# Patient Record
Sex: FEMALE | Race: WHITE | Employment: UNEMPLOYED | URBAN - METROPOLITAN AREA
[De-identification: names, ages, dates, MRNs, and addresses within clinical notes are randomized per-mention and may not be internally consistent; named-entity substitution may affect disease eponyms.]

---

## 2017-04-26 ENCOUNTER — APPOINTMENT (OUTPATIENT)
Dept: GENERAL RADIOLOGY | Age: 57
DRG: 049 | End: 2017-04-26
Attending: EMERGENCY MEDICINE
Payer: MEDICAID

## 2017-04-26 ENCOUNTER — HOSPITAL ENCOUNTER (INPATIENT)
Age: 57
LOS: 22 days | Discharge: NURSING, REHAB OR LONG-TERM CARE | DRG: 049 | End: 2017-05-19
Attending: EMERGENCY MEDICINE | Admitting: FAMILY MEDICINE
Payer: MEDICAID

## 2017-04-26 ENCOUNTER — APPOINTMENT (OUTPATIENT)
Dept: CT IMAGING | Age: 57
DRG: 049 | End: 2017-04-26
Attending: EMERGENCY MEDICINE
Payer: MEDICAID

## 2017-04-26 DIAGNOSIS — E87.6 ACUTE HYPOKALEMIA: Primary | ICD-10-CM

## 2017-04-26 DIAGNOSIS — R13.10 DYSPHAGIA, UNSPECIFIED TYPE: ICD-10-CM

## 2017-04-26 DIAGNOSIS — E86.0 DEHYDRATION: ICD-10-CM

## 2017-04-26 DIAGNOSIS — E87.20 LACTIC ACIDOSIS: ICD-10-CM

## 2017-04-26 LAB
ALBUMIN SERPL BCP-MCNC: 3.9 G/DL (ref 3.4–5)
ALBUMIN/GLOB SERPL: 1 {RATIO} (ref 0.8–1.7)
ALP SERPL-CCNC: 137 U/L (ref 45–117)
ALT SERPL-CCNC: 230 U/L (ref 13–56)
AMPHET UR QL SCN: NEGATIVE
ANION GAP BLD CALC-SCNC: 18 MMOL/L (ref 3–18)
APAP SERPL-MCNC: <2 UG/ML (ref 10–30)
APPEARANCE UR: CLEAR
AST SERPL W P-5'-P-CCNC: 103 U/L (ref 15–37)
BACTERIA URNS QL MICRO: ABNORMAL /HPF
BARBITURATES UR QL SCN: NEGATIVE
BASOPHILS # BLD AUTO: 0 K/UL (ref 0–0.06)
BASOPHILS # BLD: 0 % (ref 0–2)
BENZODIAZ UR QL: NEGATIVE
BILIRUB SERPL-MCNC: 2.6 MG/DL (ref 0.2–1)
BILIRUB UR QL: ABNORMAL
BUN SERPL-MCNC: 21 MG/DL (ref 7–18)
BUN/CREAT SERPL: 18 (ref 12–20)
CALCIUM SERPL-MCNC: 9.6 MG/DL (ref 8.5–10.1)
CANNABINOIDS UR QL SCN: NEGATIVE
CHLORIDE SERPL-SCNC: 89 MMOL/L (ref 100–108)
CK MB CFR SERPL CALC: 0.8 % (ref 0–4)
CK MB SERPL-MCNC: 1.8 NG/ML (ref 5–25)
CK SERPL-CCNC: 235 U/L (ref 26–192)
CO2 SERPL-SCNC: 25 MMOL/L (ref 21–32)
COCAINE UR QL SCN: NEGATIVE
COLOR UR: ABNORMAL
CREAT SERPL-MCNC: 1.17 MG/DL (ref 0.6–1.3)
D DIMER PPP FEU-MCNC: <0.27 UG/ML(FEU)
DIFFERENTIAL METHOD BLD: ABNORMAL
EOSINOPHIL # BLD: 0 K/UL (ref 0–0.4)
EOSINOPHIL NFR BLD: 0 % (ref 0–5)
EPITH CASTS URNS QL MICRO: ABNORMAL /LPF (ref 0–5)
ERYTHROCYTE [DISTWIDTH] IN BLOOD BY AUTOMATED COUNT: 13 % (ref 11.6–14.5)
ETHANOL SERPL-MCNC: <3 MG/DL (ref 0–3)
GLOBULIN SER CALC-MCNC: 3.8 G/DL (ref 2–4)
GLUCOSE SERPL-MCNC: 139 MG/DL (ref 74–99)
GLUCOSE UR STRIP.AUTO-MCNC: 100 MG/DL
HCT VFR BLD AUTO: 45.8 % (ref 35–45)
HDSCOM,HDSCOM: NORMAL
HGB BLD-MCNC: 17 G/DL (ref 12–16)
HGB UR QL STRIP: ABNORMAL
KETONES UR QL STRIP.AUTO: NEGATIVE MG/DL
LACTATE BLD-SCNC: 10.6 MMOL/L (ref 0.4–2)
LEUKOCYTE ESTERASE UR QL STRIP.AUTO: ABNORMAL
LYMPHOCYTES # BLD AUTO: 34 % (ref 21–52)
LYMPHOCYTES # BLD: 4.6 K/UL (ref 0.9–3.6)
MAGNESIUM SERPL-MCNC: 2.4 MG/DL (ref 1.6–2.6)
MCH RBC QN AUTO: 31.2 PG (ref 24–34)
MCHC RBC AUTO-ENTMCNC: 37.1 G/DL (ref 31–37)
MCV RBC AUTO: 84 FL (ref 74–97)
METHADONE UR QL: NEGATIVE
MONOCYTES # BLD: 1.1 K/UL (ref 0.05–1.2)
MONOCYTES NFR BLD AUTO: 8 % (ref 3–10)
NEUTS SEG # BLD: 7.7 K/UL (ref 1.8–8)
NEUTS SEG NFR BLD AUTO: 58 % (ref 40–73)
NITRITE UR QL STRIP.AUTO: POSITIVE
OPIATES UR QL: NEGATIVE
PCP UR QL: NEGATIVE
PH UR STRIP: 5.5 [PH] (ref 5–8)
PLATELET # BLD AUTO: 277 K/UL (ref 135–420)
PMV BLD AUTO: 10.9 FL (ref 9.2–11.8)
POTASSIUM SERPL-SCNC: 1.9 MMOL/L (ref 3.5–5.5)
PROT SERPL-MCNC: 7.7 G/DL (ref 6.4–8.2)
PROT UR STRIP-MCNC: ABNORMAL MG/DL
RBC # BLD AUTO: 5.45 M/UL (ref 4.2–5.3)
RBC #/AREA URNS HPF: 0 /HPF (ref 0–5)
SALICYLATES SERPL-MCNC: <2.8 MG/DL (ref 2.8–20)
SODIUM SERPL-SCNC: 132 MMOL/L (ref 136–145)
SP GR UR REFRACTOMETRY: >1.03 (ref 1–1.03)
TROPONIN I SERPL-MCNC: <0.02 NG/ML (ref 0–0.04)
TSH SERPL DL<=0.05 MIU/L-ACNC: 1.84 UIU/ML (ref 0.36–3.74)
UROBILINOGEN UR QL STRIP.AUTO: 1 EU/DL (ref 0.2–1)
WBC # BLD AUTO: 13.4 K/UL (ref 4.6–13.2)
WBC URNS QL MICRO: ABNORMAL /HPF (ref 0–4)

## 2017-04-26 PROCEDURE — 80047 BASIC METABLC PNL IONIZED CA: CPT

## 2017-04-26 PROCEDURE — 85379 FIBRIN DEGRADATION QUANT: CPT | Performed by: EMERGENCY MEDICINE

## 2017-04-26 PROCEDURE — 80307 DRUG TEST PRSMV CHEM ANLYZR: CPT | Performed by: EMERGENCY MEDICINE

## 2017-04-26 PROCEDURE — 71010 XR CHEST PORT: CPT

## 2017-04-26 PROCEDURE — 84443 ASSAY THYROID STIM HORMONE: CPT | Performed by: EMERGENCY MEDICINE

## 2017-04-26 PROCEDURE — 83735 ASSAY OF MAGNESIUM: CPT | Performed by: EMERGENCY MEDICINE

## 2017-04-26 PROCEDURE — 96365 THER/PROPH/DIAG IV INF INIT: CPT

## 2017-04-26 PROCEDURE — 85025 COMPLETE CBC W/AUTO DIFF WBC: CPT | Performed by: EMERGENCY MEDICINE

## 2017-04-26 PROCEDURE — 96366 THER/PROPH/DIAG IV INF ADDON: CPT

## 2017-04-26 PROCEDURE — 87040 BLOOD CULTURE FOR BACTERIA: CPT | Performed by: EMERGENCY MEDICINE

## 2017-04-26 PROCEDURE — 93005 ELECTROCARDIOGRAM TRACING: CPT

## 2017-04-26 PROCEDURE — 80053 COMPREHEN METABOLIC PANEL: CPT | Performed by: EMERGENCY MEDICINE

## 2017-04-26 PROCEDURE — 70450 CT HEAD/BRAIN W/O DYE: CPT

## 2017-04-26 PROCEDURE — 83605 ASSAY OF LACTIC ACID: CPT

## 2017-04-26 PROCEDURE — 74011250637 HC RX REV CODE- 250/637: Performed by: EMERGENCY MEDICINE

## 2017-04-26 PROCEDURE — 74011000258 HC RX REV CODE- 258: Performed by: EMERGENCY MEDICINE

## 2017-04-26 PROCEDURE — 77030005514 HC CATH URETH FOL14 BARD -A

## 2017-04-26 PROCEDURE — 82550 ASSAY OF CK (CPK): CPT | Performed by: EMERGENCY MEDICINE

## 2017-04-26 PROCEDURE — 51702 INSERT TEMP BLADDER CATH: CPT

## 2017-04-26 PROCEDURE — 96368 THER/DIAG CONCURRENT INF: CPT

## 2017-04-26 PROCEDURE — 87086 URINE CULTURE/COLONY COUNT: CPT | Performed by: EMERGENCY MEDICINE

## 2017-04-26 PROCEDURE — 74011250636 HC RX REV CODE- 250/636: Performed by: EMERGENCY MEDICINE

## 2017-04-26 PROCEDURE — 99285 EMERGENCY DEPT VISIT HI MDM: CPT

## 2017-04-26 PROCEDURE — 87641 MR-STAPH DNA AMP PROBE: CPT | Performed by: EMERGENCY MEDICINE

## 2017-04-26 PROCEDURE — 96375 TX/PRO/DX INJ NEW DRUG ADDON: CPT

## 2017-04-26 PROCEDURE — 96367 TX/PROPH/DG ADDL SEQ IV INF: CPT

## 2017-04-26 PROCEDURE — 81001 URINALYSIS AUTO W/SCOPE: CPT | Performed by: EMERGENCY MEDICINE

## 2017-04-26 RX ORDER — POTASSIUM CHLORIDE 7.45 MG/ML
10 INJECTION INTRAVENOUS
Status: COMPLETED | OUTPATIENT
Start: 2017-04-26 | End: 2017-04-27

## 2017-04-26 RX ORDER — DIPHENHYDRAMINE HYDROCHLORIDE 50 MG/ML
25 INJECTION, SOLUTION INTRAMUSCULAR; INTRAVENOUS
Status: COMPLETED | OUTPATIENT
Start: 2017-04-26 | End: 2017-04-26

## 2017-04-26 RX ORDER — MAGNESIUM SULFATE HEPTAHYDRATE 40 MG/ML
2 INJECTION, SOLUTION INTRAVENOUS ONCE
Status: COMPLETED | OUTPATIENT
Start: 2017-04-26 | End: 2017-04-26

## 2017-04-26 RX ORDER — OXYMETAZOLINE HCL 0.05 %
3 SPRAY, NON-AEROSOL (ML) NASAL
Status: ACTIVE | OUTPATIENT
Start: 2017-04-26 | End: 2017-04-27

## 2017-04-26 RX ORDER — SODIUM CHLORIDE 0.9 % (FLUSH) 0.9 %
5-10 SYRINGE (ML) INJECTION AS NEEDED
Status: DISCONTINUED | OUTPATIENT
Start: 2017-04-26 | End: 2017-05-19 | Stop reason: HOSPADM

## 2017-04-26 RX ORDER — LORAZEPAM 2 MG/ML
0.5 INJECTION INTRAMUSCULAR
Status: COMPLETED | OUTPATIENT
Start: 2017-04-26 | End: 2017-04-26

## 2017-04-26 RX ADMIN — POTASSIUM CHLORIDE 10 MEQ: 7.46 INJECTION, SOLUTION INTRAVENOUS at 23:46

## 2017-04-26 RX ADMIN — SODIUM CHLORIDE 2000 ML: 900 INJECTION, SOLUTION INTRAVENOUS at 22:03

## 2017-04-26 RX ADMIN — MAGNESIUM SULFATE HEPTAHYDRATE 2 G: 40 INJECTION, SOLUTION INTRAVENOUS at 22:17

## 2017-04-26 RX ADMIN — DIPHENHYDRAMINE HYDROCHLORIDE 25 MG: 50 INJECTION, SOLUTION INTRAMUSCULAR; INTRAVENOUS at 22:03

## 2017-04-26 RX ADMIN — LORAZEPAM 0.5 MG: 2 INJECTION, SOLUTION INTRAMUSCULAR; INTRAVENOUS at 22:03

## 2017-04-26 RX ADMIN — SODIUM CHLORIDE 1000 MG: 900 INJECTION, SOLUTION INTRAVENOUS at 22:21

## 2017-04-26 RX ADMIN — SODIUM CHLORIDE 1000 ML: 900 INJECTION, SOLUTION INTRAVENOUS at 22:02

## 2017-04-26 RX ADMIN — SODIUM CHLORIDE 1500 MG: 900 INJECTION, SOLUTION INTRAVENOUS at 23:00

## 2017-04-26 RX ADMIN — CEFEPIME 2 G: 2 INJECTION, POWDER, FOR SOLUTION INTRAVENOUS at 22:00

## 2017-04-26 RX ADMIN — POTASSIUM CHLORIDE 10 MEQ: 7.46 INJECTION, SOLUTION INTRAVENOUS at 23:00

## 2017-04-26 NOTE — IP AVS SNAPSHOT
[address] [address] 
[phone] Patient: Shandra Holden MRN: [id number] CAB:4[birth year]53/[birth year]33 You are allergic to the following Allergen Reactions Aspirin Other (comments) Stomach cramps Ibuprofen Not Reported This Time Levaquin (Levofloxacin) Not Reported This Time Mobic (Meloxicam) Not Reported This Time Pcn (Penicillins) Not Reported This Time Plaquenil (Hydroxychloroquine) Not Reported This Time Recent Documentation Height Weight BMI OB Status Smoking Status 1.626 m 73 kg 27.64 kg/m2 Postmenopausal Current Every Day Smoker Unresulted Labs Order Current Status CULTURE, BLOOD Preliminary result CULTURE, BLOOD Preliminary result CULTURE, URINE Preliminary result Emergency Contacts Name Discharge Info Relation Home Work Mobile Beckie Casillas [5] [phone] Man Carranza  Other Relative [6] [phone] About your hospitalization You were admitted on:  April 27, 2017 You last received care in the[address] [facility] You were discharged on:  May 19, 2017 Unit phone number:  [phone] Why you were hospitalized Your primary diagnosis was:  Encephalopathy Your diagnoses also included:  Dehydration, Hypokalemia, Lactic Acidosis, Polycythemia (Hcc), Leukocytosis, Uti (Urinary Tract Infection), Elevated Liver Enzymes, Acute Inflammatory Polyneuropathy (Hcc), 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), Quadriparesis (Hcc) Providers Seen During Your Hospitalizations Provider Role Specialty Primary office phone Charla Haddad MD Attending Provider Emergency Medicine [phone] Syed Burch MD Attending Provider General acute hospital [phone] Tiny Navas DO Attending Provider Internal Medicine [phone] Antoni Cervantes DO Attending Provider Internal Medicine [phone] Monalisa Thompson MD Attending Provider Internal Medicine [phone] Nikolai Cordero MD Attending Provider Internal Medicine [phone] Your Primary Care Physician (PCP) Primary Care Physician Office Phone Office Fax Becky Welsh [phone] Follow-up Information Follow up With Details Comments Contact Info Machelle Meyer II, MD   [address] [place] [address] [phone] Current Discharge Medication List  
  
START taking these medications Dose & Instructions Dispensing Information Comments Morning Noon Evening Bedtime  
 enoxaparin 40 mg/0.4 mL Commonly known as:  LOVENOX Your last dose was: Your next dose is:    
   
   
 Dose:  40 mg  
0.4 mL by SubCUTAneous route every twenty-four (24) hours. Quantity:  10 Syringe Refills:  0  
     
   
   
   
  
 folic acid 1 mg tablet Commonly known as:  Google Your last dose was: Your next dose is:    
   
   
 Dose:  1 mg Take 1 Tab by mouth daily. Quantity:  30 Tab Refills:  0  
     
   
   
   
  
 gabapentin 300 mg capsule Commonly known as:  NEURONTIN Your last dose was: Your next dose is:    
   
   
 Dose:  600 mg Take 2 Caps by mouth three (3) times daily. Quantity:  30 Cap Refills:  0  
     
   
   
   
  
 oxyCODONE IR 10 mg Tab immediate release tablet Commonly known as:  Suri Hails Your last dose was: Your next dose is:    
   
   
 Dose:  10 mg Take 1 Tab by mouth every four (4) hours as needed. Max Daily Amount: 60 mg.  
 Quantity:  12 Tab Refills:  0  
     
   
   
   
  
 scopolamine 1.5 mg (1 mg over 3 days) Pt3d Commonly known as:  TRANSDERM-SCOP Your last dose was: Your next dose is:    
   
   
 Dose:  1.5 mg  
1 Patch by TransDERmal route every seventy-two (72) hours. Quantity:  10 Patch Refills:  0  
     
   
   
   
  
 senna 8.6 mg tablet Commonly known as:  Jean-Pierre Gomez Your last dose was: Your next dose is:    
   
   
 Dose:  2 Tab Take 2 Tabs by mouth nightly. Quantity:  10 Tab Refills:  0 Thiamine Mononitrate 100 mg tablet Commonly known as:  B-1 Your last dose was: Your next dose is:    
   
   
 Dose:  100 mg Take 1 Tab by mouth daily. Quantity:  30 Tab Refills:  0 CONTINUE these medications which have CHANGED Dose & Instructions Dispensing Information Comments Morning Noon Evening Bedtime * acetaminophen 325 mg tablet Commonly known as:  TYLENOL What changed:  Another medication with the same name was added. Make sure you understand how and when to take each. Your last dose was: Your next dose is:    
   
   
 Dose:  650 mg Take 2 Tabs by mouth every four (4) hours as needed. Indications: BACK PAIN Quantity:  60 Tab Refills:  0  
     
   
   
   
  
 * acetaminophen 325 mg tablet Commonly known as:  TYLENOL What changed: You were already taking a medication with the same name, and this prescription was added. Make sure you understand how and when to take each. Your last dose was: Your next dose is:    
   
   
 Dose:  650 mg Take 2 Tabs by mouth every four (4) hours as needed. Quantity:  30 Tab Refills:  0  
     
   
   
   
  
 * Notice: This list has 2 medication(s) that are the same as other medications prescribed for you. Read the directions carefully, and ask your doctor or other care provider to review them with you. CONTINUE these medications which have NOT CHANGED Dose & Instructions Dispensing Information Comments Morning Noon Evening Bedtime  
 atenolol 50 mg tablet Commonly known as:  TENORMIN Your last dose was:     
   
Your next dose is:    
   
   
 Dose:  50 mg  
 Take 1 Tab by mouth daily. Indications: HYPERTENSION Quantity:  30 Tab Refills:  0  
     
   
   
   
  
 citalopram 20 mg tablet Commonly known as:  Nikko Shore Your last dose was: Your next dose is:    
   
   
 Dose:  20 mg Take 1 Tab by mouth daily. Indications: MAJOR DEPRESSIVE DISORDER Quantity:  30 Tab Refills:  0 CLARINEX 5 mg tablet Generic drug:  desloratadine Your last dose was: Your next dose is:    
   
   
 Dose:  5 mg Take 5 mg by mouth daily. Refills:  0  
     
   
   
   
  
 clonazePAM 1 mg tablet Commonly known as:  Sindy Puri Your last dose was: Your next dose is:    
   
   
 Dose:  1 mg Take 1 Tab by mouth nightly. Max Daily Amount: 1 mg. Indications: PANIC DISORDER Quantity:  30 Tab Refills:  0  
     
   
   
   
  
 * famotidine 40 mg tablet Commonly known as:  PEPCID Your last dose was: Your next dose is:    
   
   
 Dose:  40 mg Take 40 mg by mouth daily. Refills:  0  
     
   
   
   
  
 * famotidine 40 mg tablet Commonly known as:  PEPCID Your last dose was: Your next dose is:    
   
   
 Dose:  40 mg Take 1 Tab by mouth daily. Indications: DYSPEPSIA, HEARTBURN PREVENTION Quantity:  30 Tab Refills:  0  
     
   
   
   
  
 fluticasone 50 mcg/actuation nasal spray Commonly known as:  Yanni Dean Your last dose was: Your next dose is:    
   
   
 Dose:  1 Spray 1 Mott by Both Nostrils route daily. Quantity:  1 Bottle Refills:  0 KLOR-CON M10 10 mEq tablet Generic drug:  potassium chloride Your last dose was: Your next dose is: Take  by mouth two (2) times a day. Refills:  0  
     
   
   
   
  
 loratadine 10 mg tablet Commonly known as:  Pryor Siemens Your last dose was:     
   
Your next dose is:    
   
   
 Dose:  10 mg  
 Take 1 Tab by mouth daily. Indications: ALLERGIC RHINITIS Quantity:  30 Tab Refills:  0  
     
   
   
   
  
 mirtazapine 7.5 mg tablet Commonly known as:  Otis Valenzuela Your last dose was: Your next dose is:    
   
   
 Dose:  7.5 mg Take 1 Tab by mouth nightly. Indications: MAJOR DEPRESSIVE DISORDER Quantity:  30 Tab Refills:  0 MUCINEX -30 mg per tablet Generic drug:  guaiFENesin-dextromethorphan SR Your last dose was: Your next dose is:    
   
   
 Dose:  1 Tab Take 1 Tab by mouth two (2) times a day. Refills:  0  
     
   
   
   
  
 nitroglycerin 0.4 mg SL tablet Commonly known as:  NITROSTAT Your last dose was: Your next dose is:    
   
   
 by SubLINGual route every five (5) minutes as needed. Refills:  0 PROAIR HFA 90 mcg/actuation inhaler Generic drug:  albuterol Your last dose was: Your next dose is:    
   
   
 Dose:  1 Puff Take 1 Puff by inhalation. Refills:  0  
     
   
   
   
  
 pseudoephedrine 30 mg tablet Commonly known as:  SUDAFED Your last dose was: Your next dose is:    
   
   
 Dose:  30 mg Take 1 Tab by mouth every six (6) hours as needed for Congestion. Indications: NASAL CONGESTION Quantity:  60 Tab Refills:  0 QUEtiapine 100 mg tablet Commonly known as:  SEROquel Your last dose was: Your next dose is:    
   
   
 Dose:  100 mg Take 1 Tab by mouth nightly. Indications: DEPRESSION TREATMENT ADJUNCT Quantity:  30 Tab Refills:  0  
     
   
   
   
  
 * tiZANidine 4 mg tablet Commonly known as:  Simeon Vega Your last dose was: Your next dose is:    
   
   
 Dose:  4-8 mg Take 1-2 Tabs by mouth three (3) times daily. As needed for muscle spasms Quantity:  180 Tab Refills:  2  
     
   
   
   
  
 * tiZANidine 4 mg tablet Commonly known as:  Simeon Vega Your last dose was: Your next dose is: One tablet every 6 hours as needed for muscle spasm  Indications: MUSCLE SPASM Quantity:  90 Tab Refills:  0  
     
   
   
   
  
 * Notice: This list has 4 medication(s) that are the same as other medications prescribed for you. Read the directions carefully, and ask your doctor or other care provider to review them with you. Where to Get Your Medications Information on where to get these meds will be given to you by the nurse or doctor. ! Ask your nurse or doctor about these medications  
  acetaminophen 325 mg tablet  
 enoxaparin 40 JA/8.3 mL  
 folic acid 1 mg tablet  
 gabapentin 300 mg capsule  
 oxyCODONE IR 10 mg Tab immediate release tablet  
 scopolamine 1.5 mg (1 mg over 3 days) Pt3d  
 senna 8.6 mg tablet Thiamine Mononitrate 100 mg tablet Discharge Instructions DISCHARGE SUMMARY from Nurse The following personal items are in your possession at time of discharge: 
 
Dental Appliances: None Visual Aid: None Home Medications: None Jewelry: [place] Clothing: [place] Other Valuables: None PATIENT INSTRUCTIONS: 
 
 
F-face looks uneven A-arms unable to move or move unevenly S-speech slurred or non-existent T-time-call 911 as soon as signs and symptoms begin-DO NOT go Back to bed or wait to see if you get better-TIME IS BRAIN. Warning Signs of HEART ATTACK Call 911 if you have these symptoms: 
? Chest discomfort. Most heart attacks involve discomfort in the center of the chest that lasts more than a few minutes, or that goes away and comes back. It can feel like uncomfortable pressure, squeezing, fullness, or pain. ? Discomfort in other areas of the upper body. Symptoms can include pain or discomfort in one or both arms, the back, neck, jaw, or stomach. ? Shortness of breath with or without chest discomfort. ? Other signs may include breaking out in a cold sweat, nausea, or lightheadedness. Don't wait more than five minutes to call [address]! Fast action can save your life. Calling 911 is almost always the fastest way to get lifesaving treatment. Emergency Medical Services staff can begin treatment when they arrive  up to an hour sooner than if someone gets to the hospital by car. The discharge information has been reviewed with the patient. The patient verbalized understanding. Discharge medications reviewed with the patient and appropriate educational materials and side effects teaching were provided. Hypokalemia: Care Instructions Your Care Instructions Hypokalemia (say \"id-pq-gfa-RAJ-natasha-uh\") is a low level of potassium. The heart, muscles, kidneys, and nervous system all need potassium to work well. This problem has many different causes. Kidney problems, diet, and medicines like diuretics and laxatives can cause it. So can vomiting or diarrhea. In some cases, cancer is the cause. Your doctor may do tests to find the cause of your low potassium levels. You may need medicines to bring your potassium levels back to normal. You may also need regular blood tests to check your potassium. If you have very low potassium, you may need intravenous (IV) medicines. You also may need tests to check the electrical activity of your heart. Heart problems caused by low potassium levels can be very serious. Follow-up care is a key part of your treatment and safety. Be sure to make and go to all appointments, and call your doctor if you are having problems. It's also a good idea to know your test results and keep a list of the medicines you take. How can you care for yourself at home? · If your doctor recommends it, eat foods that have a lot of potassium. These include fresh fruits, juices, and vegetables. They also include nuts, beans, and milk. · Be safe with medicines. If your doctor prescribes medicines or potassium supplements, take them exactly as directed. Call your doctor if you have any problems with your medicines. · Get your potassium levels tested as often as your doctor tells you. When should you call for help? Call 911 anytime you think you may need emergency care. For example, call if: 
· You feel like your heart is missing beats. Heart problems caused by low potassium can cause death. · You passed out (lost consciousness). · You have a seizure. Call your doctor now or seek immediate medical care if: 
· You feel weak or unusually tired. · You have severe arm or leg cramps. · You have tingling or numbness. · You feel sick to your stomach, or you vomit. · You have belly cramps. · You feel bloated or constipated. · You have to urinate a lot. · You feel very thirsty most of the time. · You are dizzy or lightheaded, or you feel like you may faint. · You feel depressed, or you lose touch with reality. Watch closely for changes in your health, and be sure to contact your doctor if: 
· You do not get better as expected. Where can you learn more? Go to http://angel-huey.info/. Enter G358 in the search box to learn more about \"Hypokalemia: Care Instructions. \" Current as of: July 28, 2016 Content Version: 11.2 © 1960-8842 [employer]. Care instructions adapted under license by [employer] (which disclaims liability or warranty for this information). If you have questions about a medical condition or this instruction, always ask your healthcare professional. [address] any warranty or liability for your use of this information. Discharge Orders None [facility] Announcement We are excited to announce that we are making your provider's discharge notes available to you in [employer]. You will see these notes when they are completed and signed by the physician that discharged you from your recent hospital stay. If you have any questions or concerns about any information you see in [employer], please call the Health Information Department where you were seen or reach out to your Primary Care Provider for more information about your plan of care. Introducing [facility]! Mo Nixon introduces [employer] patient portal. Now you can access parts of your medical record, email your doctor's office, and request medication refills online. 1. In your internet browser, go to [email or web address] 2. Click on the First Time User? Click Here link in the Sign In box. You will see the New Member Sign Up page. 3. Enter your [employer] Access Code exactly as it appears below. You will not need to use this code after youve completed the sign-up process. If you do not sign up before the expiration date, you must request a new code. · [employer] Access Code: [email or web address] Expires: 7/25/2017  9:09 PM 
 
4. Enter the last four digits of your Social Security Number (xxxx) and Date of Birth (mm/dd/yyyy) as indicated and click Submit. You will be taken to the next sign-up page. 5. Create a [employer] ID. This will be your [employer] login ID and cannot be changed, so think of one that is secure and easy to remember. 6. Create a [employer] password. You can change your password at any time. 7. Enter your Password Reset Question and Answer. This can be used at a later time if you forget your password. 8. Enter your e-mail address. You will receive e-mail notification when new information is available in [address] 9. Click Sign Up. You can now view and download portions of your medical record.  
10. Click the Download Summary menu link to download a portable copy of your medical information. If you have questions, please visit the Frequently Asked Questions section of the [employer] website. Remember, MyChart is NOT to be used for urgent needs. For medical emergencies, dial 911. Now available from your iPhone and Android! General Information Please provide this summary of care documentation to your next provider. Patient Signature:  ____________________________________________________________ Date:  ____________________________________________________________  
  
Venu Fransisco Provider Signature:  ____________________________________________________________ Date:  ____________________________________________________________

## 2017-04-26 NOTE — IP AVS SNAPSHOT
Current Discharge Medication List  
  
START taking these medications Dose & Instructions Dispensing Information Comments Morning Noon Evening Bedtime  
 enoxaparin 40 mg/0.4 mL Commonly known as:  LOVENOX Your last dose was: Your next dose is:    
   
   
 Dose:  40 mg  
0.4 mL by SubCUTAneous route every twenty-four (24) hours. Quantity:  10 Syringe Refills:  0  
     
   
   
   
  
 folic acid 1 mg tablet Commonly known as:  Google Your last dose was: Your next dose is:    
   
   
 Dose:  1 mg Take 1 Tab by mouth daily. Quantity:  30 Tab Refills:  0  
     
   
   
   
  
 gabapentin 300 mg capsule Commonly known as:  NEURONTIN Your last dose was: Your next dose is:    
   
   
 Dose:  600 mg Take 2 Caps by mouth three (3) times daily. Quantity:  30 Cap Refills:  0  
     
   
   
   
  
 oxyCODONE IR 10 mg Tab immediate release tablet Commonly known as:  Adriana Vlad Your last dose was: Your next dose is:    
   
   
 Dose:  10 mg Take 1 Tab by mouth every four (4) hours as needed. Max Daily Amount: 60 mg.  
 Quantity:  12 Tab Refills:  0  
     
   
   
   
  
 scopolamine 1.5 mg (1 mg over 3 days) Pt3d Commonly known as:  TRANSDERM-SCOP Your last dose was: Your next dose is:    
   
   
 Dose:  1.5 mg  
1 Patch by TransDERmal route every seventy-two (72) hours. Quantity:  10 Patch Refills:  0  
     
   
   
   
  
 senna 8.6 mg tablet Commonly known as:  Jean-Pierre Gomez Your last dose was: Your next dose is:    
   
   
 Dose:  2 Tab Take 2 Tabs by mouth nightly. Quantity:  10 Tab Refills:  0 Thiamine Mononitrate 100 mg tablet Commonly known as:  B-1 Your last dose was: Your next dose is:    
   
   
 Dose:  100 mg Take 1 Tab by mouth daily. Quantity:  30 Tab Refills:  0 CONTINUE these medications which have CHANGED Dose & Instructions Dispensing Information Comments Morning Noon Evening Bedtime * acetaminophen 325 mg tablet Commonly known as:  TYLENOL What changed:  Another medication with the same name was added. Make sure you understand how and when to take each. Your last dose was: Your next dose is:    
   
   
 Dose:  650 mg Take 2 Tabs by mouth every four (4) hours as needed. Indications: BACK PAIN Quantity:  60 Tab Refills:  0  
     
   
   
   
  
 * acetaminophen 325 mg tablet Commonly known as:  TYLENOL What changed: You were already taking a medication with the same name, and this prescription was added. Make sure you understand how and when to take each. Your last dose was: Your next dose is:    
   
   
 Dose:  650 mg Take 2 Tabs by mouth every four (4) hours as needed. Quantity:  30 Tab Refills:  0  
     
   
   
   
  
 * Notice: This list has 2 medication(s) that are the same as other medications prescribed for you. Read the directions carefully, and ask your doctor or other care provider to review them with you. CONTINUE these medications which have NOT CHANGED Dose & Instructions Dispensing Information Comments Morning Noon Evening Bedtime  
 atenolol 50 mg tablet Commonly known as:  TENORMIN Your last dose was: Your next dose is:    
   
   
 Dose:  50 mg Take 1 Tab by mouth daily. Indications: HYPERTENSION Quantity:  30 Tab Refills:  0  
     
   
   
   
  
 citalopram 20 mg tablet Commonly known as:  Margaret Rothman Your last dose was: Your next dose is:    
   
   
 Dose:  20 mg Take 1 Tab by mouth daily. Indications: MAJOR DEPRESSIVE DISORDER Quantity:  30 Tab Refills:  0 CLARINEX 5 mg tablet Generic drug:  desloratadine Your last dose was: Your next dose is:    
   
   
 Dose:  5 mg Take 5 mg by mouth daily. Refills:  0  
     
   
   
   
  
 clonazePAM 1 mg tablet Commonly known as:  Jing Nunez Your last dose was: Your next dose is:    
   
   
 Dose:  1 mg Take 1 Tab by mouth nightly. Max Daily Amount: 1 mg. Indications: PANIC DISORDER Quantity:  30 Tab Refills:  0  
     
   
   
   
  
 * famotidine 40 mg tablet Commonly known as:  PEPCID Your last dose was: Your next dose is:    
   
   
 Dose:  40 mg Take 40 mg by mouth daily. Refills:  0  
     
   
   
   
  
 * famotidine 40 mg tablet Commonly known as:  PEPCID Your last dose was: Your next dose is:    
   
   
 Dose:  40 mg Take 1 Tab by mouth daily. Indications: DYSPEPSIA, HEARTBURN PREVENTION Quantity:  30 Tab Refills:  0  
     
   
   
   
  
 fluticasone 50 mcg/actuation nasal spray Commonly known as:  Korey Cohn Your last dose was: Your next dose is:    
   
   
 Dose:  1 Spray 1 [place] by Both Nostrils route daily. Quantity:  1 Bottle Refills:  0 KLOR-CON M10 10 mEq tablet Generic drug:  potassium chloride Your last dose was: Your next dose is: Take  by mouth two (2) times a day. Refills:  0  
     
   
   
   
  
 loratadine 10 mg tablet Commonly known as:  Fernanda Feliciano Your last dose was: Your next dose is:    
   
   
 Dose:  10 mg Take 1 Tab by mouth daily. Indications: ALLERGIC RHINITIS Quantity:  30 Tab Refills:  0  
     
   
   
   
  
 mirtazapine 7.5 mg tablet Commonly known as:  Sharyle Meadows Your last dose was: Your next dose is:    
   
   
 Dose:  7.5 mg Take 1 Tab by mouth nightly. Indications: MAJOR DEPRESSIVE DISORDER Quantity:  30 Tab Refills:  0 MUCINEX -30 mg per tablet Generic drug:  guaiFENesin-dextromethorphan SR Your last dose was: Your next dose is:    
   
   
 Dose:  1 Tab Take 1 Tab by mouth two (2) times a day. Refills:  0  
     
   
   
   
  
 nitroglycerin 0.4 mg SL tablet Commonly known as:  NITROSTAT Your last dose was: Your next dose is:    
   
   
 by SubLINGual route every five (5) minutes as needed. Refills:  0 PROAIR HFA 90 mcg/actuation inhaler Generic drug:  albuterol Your last dose was: Your next dose is:    
   
   
 Dose:  1 Puff Take 1 Puff by inhalation. Refills:  0  
     
   
   
   
  
 pseudoephedrine 30 mg tablet Commonly known as:  SUDAFED Your last dose was: Your next dose is:    
   
   
 Dose:  30 mg Take 1 Tab by mouth every six (6) hours as needed for Congestion. Indications: NASAL CONGESTION Quantity:  60 Tab Refills:  0 QUEtiapine 100 mg tablet Commonly known as:  SEROquel Your last dose was: Your next dose is:    
   
   
 Dose:  100 mg Take 1 Tab by mouth nightly. Indications: DEPRESSION TREATMENT ADJUNCT Quantity:  30 Tab Refills:  0  
     
   
   
   
  
 * tiZANidine 4 mg tablet Commonly known as:  Henna Arreguin Your last dose was: Your next dose is:    
   
   
 Dose:  4-8 mg Take 1-2 Tabs by mouth three (3) times daily. As needed for muscle spasms Quantity:  180 Tab Refills:  2  
     
   
   
   
  
 * tiZANidine 4 mg tablet Commonly known as:  Henna Arreguin Your last dose was: Your next dose is: One tablet every 6 hours as needed for muscle spasm  Indications: MUSCLE SPASM Quantity:  90 Tab Refills:  0  
     
   
   
   
  
 * Notice: This list has 4 medication(s) that are the same as other medications prescribed for you. Read the directions carefully, and ask your doctor or other care provider to review them with you. Where to Get Your Medications Information on where to get these meds will be given to you by the nurse or doctor. ! Ask your nurse or doctor about these medications  
  acetaminophen 325 mg tablet  
 enoxaparin 40 VH/8.1 mL  
 folic acid 1 mg tablet  
 gabapentin 300 mg capsule  
 oxyCODONE IR 10 mg Tab immediate release tablet  
 scopolamine 1.5 mg (1 mg over 3 days) Pt3d  
 senna 8.6 mg tablet Thiamine Mononitrate 100 mg tablet

## 2017-04-27 ENCOUNTER — APPOINTMENT (OUTPATIENT)
Dept: CT IMAGING | Age: 57
DRG: 049 | End: 2017-04-27
Attending: EMERGENCY MEDICINE
Payer: MEDICAID

## 2017-04-27 PROBLEM — R74.8 ELEVATED LIVER ENZYMES: Status: ACTIVE | Noted: 2017-04-27

## 2017-04-27 PROBLEM — E87.6 HYPOKALEMIA: Status: ACTIVE | Noted: 2017-04-27

## 2017-04-27 PROBLEM — D72.829 LEUKOCYTOSIS: Status: ACTIVE | Noted: 2017-04-27

## 2017-04-27 PROBLEM — E86.0 DEHYDRATION: Status: ACTIVE | Noted: 2017-04-27

## 2017-04-27 PROBLEM — N39.0 UTI (URINARY TRACT INFECTION): Status: ACTIVE | Noted: 2017-04-27

## 2017-04-27 PROBLEM — D75.1 POLYCYTHEMIA: Status: ACTIVE | Noted: 2017-04-27

## 2017-04-27 PROBLEM — E87.20 LACTIC ACIDOSIS: Status: ACTIVE | Noted: 2017-04-27

## 2017-04-27 PROBLEM — G93.40 ENCEPHALOPATHY: Status: ACTIVE | Noted: 2017-04-27

## 2017-04-27 LAB
ALBUMIN SERPL BCP-MCNC: 2.7 G/DL (ref 3.4–5)
ALBUMIN/GLOB SERPL: 1.1 {RATIO} (ref 0.8–1.7)
ALP SERPL-CCNC: 92 U/L (ref 45–117)
ALT SERPL-CCNC: 157 U/L (ref 13–56)
ANION GAP BLD CALC-SCNC: 10 MMOL/L (ref 3–18)
ANION GAP BLD CALC-SCNC: 12 MMOL/L (ref 3–18)
AST SERPL W P-5'-P-CCNC: 78 U/L (ref 15–37)
ATRIAL RATE: 121 BPM
BACTERIA SPEC CULT: NORMAL
BASOPHILS # BLD AUTO: 0 K/UL (ref 0–0.06)
BASOPHILS # BLD: 0 % (ref 0–2)
BILIRUB DIRECT SERPL-MCNC: 1 MG/DL (ref 0–0.2)
BILIRUB SERPL-MCNC: 1.9 MG/DL (ref 0.2–1)
BUN BLD-MCNC: 22 MG/DL (ref 7–18)
BUN SERPL-MCNC: 10 MG/DL (ref 7–18)
BUN SERPL-MCNC: 13 MG/DL (ref 7–18)
BUN/CREAT SERPL: 20 (ref 12–20)
BUN/CREAT SERPL: 24 (ref 12–20)
CA-I BLD-MCNC: 0.95 MMOL/L (ref 1.12–1.32)
CALCIUM SERPL-MCNC: 6.9 MG/DL (ref 8.5–10.1)
CALCIUM SERPL-MCNC: 7.3 MG/DL (ref 8.5–10.1)
CALCULATED P AXIS, ECG09: 42 DEGREES
CALCULATED R AXIS, ECG10: 56 DEGREES
CALCULATED T AXIS, ECG11: 90 DEGREES
CHLORIDE BLD-SCNC: 87 MMOL/L (ref 100–108)
CHLORIDE SERPL-SCNC: 103 MMOL/L (ref 100–108)
CHLORIDE SERPL-SCNC: 104 MMOL/L (ref 100–108)
CO2 BLD-SCNC: 24 MMOL/L (ref 19–24)
CO2 SERPL-SCNC: 22 MMOL/L (ref 21–32)
CO2 SERPL-SCNC: 23 MMOL/L (ref 21–32)
CREAT SERPL-MCNC: 0.51 MG/DL (ref 0.6–1.3)
CREAT SERPL-MCNC: 0.55 MG/DL (ref 0.6–1.3)
CREAT UR-MCNC: 0.7 MG/DL (ref 0.6–1.3)
CRP SERPL-MCNC: 2.2 MG/DL (ref 0–0.3)
DIAGNOSIS, 93000: NORMAL
DIFFERENTIAL METHOD BLD: ABNORMAL
EOSINOPHIL # BLD: 0 K/UL (ref 0–0.4)
EOSINOPHIL NFR BLD: 0 % (ref 0–5)
ERYTHROCYTE [DISTWIDTH] IN BLOOD BY AUTOMATED COUNT: 13.2 % (ref 11.6–14.5)
GLOBULIN SER CALC-MCNC: 2.4 G/DL (ref 2–4)
GLUCOSE BLD STRIP.AUTO-MCNC: 150 MG/DL (ref 74–106)
GLUCOSE SERPL-MCNC: 118 MG/DL (ref 74–99)
GLUCOSE SERPL-MCNC: 83 MG/DL (ref 74–99)
HCT VFR BLD AUTO: 34 % (ref 35–45)
HCT VFR BLD CALC: 50 % (ref 36–49)
HGB BLD-MCNC: 12.2 G/DL (ref 12–16)
HGB BLD-MCNC: 17 G/DL (ref 12–16)
LACTATE BLD-SCNC: 2.6 MMOL/L (ref 0.4–2)
LACTATE BLD-SCNC: 2.7 MMOL/L (ref 0.4–2)
LACTATE BLD-SCNC: 9.1 MMOL/L (ref 0.4–2)
LYMPHOCYTES # BLD AUTO: 27 % (ref 21–52)
LYMPHOCYTES # BLD: 2.2 K/UL (ref 0.9–3.6)
MCH RBC QN AUTO: 30.6 PG (ref 24–34)
MCHC RBC AUTO-ENTMCNC: 35.9 G/DL (ref 31–37)
MCV RBC AUTO: 85.2 FL (ref 74–97)
MONOCYTES # BLD: 0.5 K/UL (ref 0.05–1.2)
MONOCYTES NFR BLD AUTO: 7 % (ref 3–10)
NEUTS SEG # BLD: 5.3 K/UL (ref 1.8–8)
NEUTS SEG NFR BLD AUTO: 66 % (ref 40–73)
P-R INTERVAL, ECG05: 126 MS
PLATELET # BLD AUTO: 181 K/UL (ref 135–420)
PMV BLD AUTO: 10.3 FL (ref 9.2–11.8)
POTASSIUM BLD-SCNC: <2 MMOL/L (ref 3.5–5.5)
POTASSIUM SERPL-SCNC: 2.4 MMOL/L (ref 3.5–5.5)
POTASSIUM SERPL-SCNC: 2.8 MMOL/L (ref 3.5–5.5)
PROT SERPL-MCNC: 5.1 G/DL (ref 6.4–8.2)
Q-T INTERVAL, ECG07: 424 MS
QRS DURATION, ECG06: 80 MS
QTC CALCULATION (BEZET), ECG08: 602 MS
RBC # BLD AUTO: 3.99 M/UL (ref 4.2–5.3)
SERVICE CMNT-IMP: NORMAL
SODIUM BLD-SCNC: 133 MMOL/L (ref 136–145)
SODIUM SERPL-SCNC: 136 MMOL/L (ref 136–145)
SODIUM SERPL-SCNC: 138 MMOL/L (ref 136–145)
VENTRICULAR RATE, ECG03: 121 BPM
WBC # BLD AUTO: 8.1 K/UL (ref 4.6–13.2)

## 2017-04-27 PROCEDURE — 86140 C-REACTIVE PROTEIN: CPT | Performed by: FAMILY MEDICINE

## 2017-04-27 PROCEDURE — 80048 BASIC METABOLIC PNL TOTAL CA: CPT | Performed by: INTERNAL MEDICINE

## 2017-04-27 PROCEDURE — 74011636320 HC RX REV CODE- 636/320: Performed by: EMERGENCY MEDICINE

## 2017-04-27 PROCEDURE — 83605 ASSAY OF LACTIC ACID: CPT

## 2017-04-27 PROCEDURE — 92610 EVALUATE SWALLOWING FUNCTION: CPT

## 2017-04-27 PROCEDURE — 36415 COLL VENOUS BLD VENIPUNCTURE: CPT | Performed by: FAMILY MEDICINE

## 2017-04-27 PROCEDURE — 74011250637 HC RX REV CODE- 250/637: Performed by: FAMILY MEDICINE

## 2017-04-27 PROCEDURE — 74011250636 HC RX REV CODE- 250/636

## 2017-04-27 PROCEDURE — 85025 COMPLETE CBC W/AUTO DIFF WBC: CPT | Performed by: FAMILY MEDICINE

## 2017-04-27 PROCEDURE — 80074 ACUTE HEPATITIS PANEL: CPT | Performed by: INTERNAL MEDICINE

## 2017-04-27 PROCEDURE — 65660000000 HC RM CCU STEPDOWN

## 2017-04-27 PROCEDURE — 74011250636 HC RX REV CODE- 250/636: Performed by: INTERNAL MEDICINE

## 2017-04-27 PROCEDURE — 74011250637 HC RX REV CODE- 250/637: Performed by: INTERNAL MEDICINE

## 2017-04-27 PROCEDURE — 80048 BASIC METABOLIC PNL TOTAL CA: CPT | Performed by: FAMILY MEDICINE

## 2017-04-27 PROCEDURE — 74011000258 HC RX REV CODE- 258: Performed by: EMERGENCY MEDICINE

## 2017-04-27 PROCEDURE — 74011000258 HC RX REV CODE- 258

## 2017-04-27 PROCEDURE — 74011250636 HC RX REV CODE- 250/636: Performed by: EMERGENCY MEDICINE

## 2017-04-27 PROCEDURE — 80076 HEPATIC FUNCTION PANEL: CPT | Performed by: FAMILY MEDICINE

## 2017-04-27 PROCEDURE — 74011250636 HC RX REV CODE- 250/636: Performed by: FAMILY MEDICINE

## 2017-04-27 PROCEDURE — 74177 CT ABD & PELVIS W/CONTRAST: CPT

## 2017-04-27 RX ORDER — OXYMETAZOLINE HCL 0.05 %
2 SPRAY, NON-AEROSOL (ML) NASAL
Status: DISCONTINUED | OUTPATIENT
Start: 2017-04-27 | End: 2017-05-19 | Stop reason: HOSPADM

## 2017-04-27 RX ORDER — TRAMADOL HYDROCHLORIDE 50 MG/1
50 TABLET ORAL
Status: DISCONTINUED | OUTPATIENT
Start: 2017-04-27 | End: 2017-05-04

## 2017-04-27 RX ORDER — DEXTROSE, SODIUM CHLORIDE, AND POTASSIUM CHLORIDE 5; .45; .3 G/100ML; G/100ML; G/100ML
INJECTION INTRAVENOUS CONTINUOUS
Status: DISCONTINUED | OUTPATIENT
Start: 2017-04-27 | End: 2017-04-30

## 2017-04-27 RX ORDER — SODIUM CHLORIDE AND POTASSIUM CHLORIDE .9; .15 G/100ML; G/100ML
SOLUTION INTRAVENOUS CONTINUOUS
Status: DISCONTINUED | OUTPATIENT
Start: 2017-04-27 | End: 2017-04-27

## 2017-04-27 RX ORDER — VANCOMYCIN HYDROCHLORIDE 500 MG/10ML
INJECTION, POWDER, LYOPHILIZED, FOR SOLUTION INTRAVENOUS
Status: COMPLETED
Start: 2017-04-27 | End: 2017-04-27

## 2017-04-27 RX ORDER — ENOXAPARIN SODIUM 100 MG/ML
40 INJECTION SUBCUTANEOUS EVERY 24 HOURS
Status: DISCONTINUED | OUTPATIENT
Start: 2017-04-27 | End: 2017-05-19 | Stop reason: HOSPADM

## 2017-04-27 RX ORDER — ONDANSETRON 2 MG/ML
4 INJECTION INTRAMUSCULAR; INTRAVENOUS
Status: DISCONTINUED | OUTPATIENT
Start: 2017-04-27 | End: 2017-05-19 | Stop reason: HOSPADM

## 2017-04-27 RX ORDER — SODIUM CHLORIDE 900 MG/100ML
INJECTION INTRAVENOUS
Status: COMPLETED
Start: 2017-04-27 | End: 2017-04-27

## 2017-04-27 RX ORDER — LORAZEPAM 1 MG/1
1 TABLET ORAL
Status: DISCONTINUED | OUTPATIENT
Start: 2017-04-27 | End: 2017-05-19 | Stop reason: HOSPADM

## 2017-04-27 RX ADMIN — IOPAMIDOL: 612 INJECTION, SOLUTION INTRAVENOUS at 02:27

## 2017-04-27 RX ADMIN — SODIUM CHLORIDE 1000 ML: 900 INJECTION, SOLUTION INTRAVENOUS at 01:16

## 2017-04-27 RX ADMIN — CEFEPIME 2 G: 2 INJECTION, POWDER, FOR SOLUTION INTRAVENOUS at 05:47

## 2017-04-27 RX ADMIN — LORAZEPAM 1 MG: 1 TABLET ORAL at 21:21

## 2017-04-27 RX ADMIN — POTASSIUM CHLORIDE 10 MEQ: 7.46 INJECTION, SOLUTION INTRAVENOUS at 00:46

## 2017-04-27 RX ADMIN — SODIUM CHLORIDE AND POTASSIUM CHLORIDE: 9; 1.49 INJECTION, SOLUTION INTRAVENOUS at 06:22

## 2017-04-27 RX ADMIN — VANCOMYCIN HYDROCHLORIDE: 500 INJECTION, POWDER, LYOPHILIZED, FOR SOLUTION INTRAVENOUS at 01:37

## 2017-04-27 RX ADMIN — SODIUM CHLORIDE: 900 INJECTION, SOLUTION INTRAVENOUS at 01:37

## 2017-04-27 RX ADMIN — POTASSIUM CHLORIDE 10 MEQ: 7.46 INJECTION, SOLUTION INTRAVENOUS at 01:46

## 2017-04-27 RX ADMIN — TRAMADOL HYDROCHLORIDE 50 MG: 50 TABLET, FILM COATED ORAL at 11:01

## 2017-04-27 RX ADMIN — CEFEPIME 2 G: 2 INJECTION, POWDER, FOR SOLUTION INTRAVENOUS at 21:23

## 2017-04-27 RX ADMIN — CEFEPIME 2 G: 2 INJECTION, POWDER, FOR SOLUTION INTRAVENOUS at 14:49

## 2017-04-27 RX ADMIN — ENOXAPARIN SODIUM 40 MG: 40 INJECTION SUBCUTANEOUS at 06:24

## 2017-04-27 RX ADMIN — DEXTROSE MONOHYDRATE, SODIUM CHLORIDE, AND POTASSIUM CHLORIDE: 50; 4.5; 2.98 INJECTION, SOLUTION INTRAVENOUS at 10:50

## 2017-04-27 RX ADMIN — DEXTROSE MONOHYDRATE, SODIUM CHLORIDE, AND POTASSIUM CHLORIDE: 50; 4.5; 2.98 INJECTION, SOLUTION INTRAVENOUS at 21:20

## 2017-04-27 NOTE — PROGRESS NOTES
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    Pain in joint, multiple sites M25.50    Cervicalgia M54.2    Chronic low back pain M54.5, G89.29    Migraine without aura G43.009    Myalgia and myositis NHP8209    Encounter for long-term (current) use of high-risk medication Z79.899    Enthesopathy of hip region M76.899    Tobacco abuse Z72.0    Chronic headache disorder R51    Drug-induced constipation K59.03    Muscle spasms of neck M62.838    RLS (restless legs syndrome) G25.81    Depression F32.9    Dehydration E86.0    Hypokalemia E87.6    Lactic acidosis E87.2    Polycythemia (HCC) D75.1    Leukocytosis D72.829    UTI (urinary tract infection) N39.0    Encephalopathy G93.40    Elevated liver enzymes R74.8       Assesment     Acute Encephalopathy metabolic vs psychosis-resolving  - replace Potassium and rehydrate patient, Tx UTI  - CT head wnl  - Psych eval damien     Dehydration with AICHA  Cr 1.17 to 0.5 with IVF      Severe Hypokalemia   - K 1.9 ->40mEQ ER, now 40meQ in drip q8h     Lactic acidosis  - 2/2 hypovolemia-resolved     Polycythemia   - 2/2 hemoconcentration from dehydration    Leukocytosis   - reactive vs infection, monitor     UTI   - Cefepime pending urine culture result      Hyponatremia   -resolved. Does not fit with dehydration, possible malnutrition/poor po     Transamnitis  - UDS neg, Hepatitis panel pending. Etiology uncertain, possibly fatty liver.     Copd   -prn duonebs     Possible Dysphagia   - Speech eval      Anxiety   - resume seroquel 100, celexa 20 today, po ativan     DVT Protocol Active: yes  Code Status:  Full Code     Disposition: Unk. Req 1-2 hospital days    Subjective:     CC: Altered mental status (decreased PO, incontinent, weakness)    Interval History: Psychosis improving. Baseline unk. Very concerned about her health has a dry mouth. Nasal congestion. ROS: 11 point ROS negative except for    Objective:     Visit Vitals    /62    Pulse 89    Temp 97.5 °F (36.4 °C)    Resp 20    Ht 5' 4\" (1.626 m)    Wt 68.6 kg (151 lb 3.2 oz)    SpO2 98%    BMI 25.95 kg/m2       Temp (24hrs), Av.2 °F (36.8 °C), Min:97.3 °F (36.3 °C), Max:99.4 °F (37.4 °C)        Intake/Output Summary (Last 24 hours) at 17 1536  Last data filed at 17 0440   Gross per 24 hour   Intake                0 ml   Output             1000 ml   Net            -1000 ml       Gen: NAD does have some orientation  HEENT:  FRANK CHRIS. Neck: No Bruits/JVD   Lungs:   CTAB. Good respiratory effort  Heart:   RR S1 S2 without M/R/G  Abdomen: ND,NT, BSX4,   Extremities:   No LE edema. No cyanosis.   Skin:  no jaundice/lesions      Data Review:     Meds/Labs/Tests reviewed    Current Shift:     Last three shifts:   1901 -  0700  In: -   Out: 1000 [Urine:1000]  Recent Labs      17   0545  17   WBC  8.1  13.4*   RBC  3.99*  5.45*   HGB  12.2  17.0*   HCT  34.0*  45.8*   PLT  181  277   GRANS  66  58   LYMPH  27  34   EOS  0  0       Recent Labs      17   0545  17   BUN  13  21*   CREA  0.55*  1.17   CA  6.9*  9.6   ALB  2.7*  3.9   K  2.4*  1.9*   NA  138  132*   CL  103  89*   CO2  23  25   GLU  83  139*        Lab Results   Component Value Date/Time    Glucose 83 2017 05:45 AM    Glucose 139 2017 09:15 PM    Glucose 88 10/04/2016 03:28 PM    Glucose 95 10/02/2016 02:14 AM    Glucose 113 2016 02:21 PM          Care coordination with Nursing/Consultants/staff: 5  Prior history, labs, and charting reviewed: 15    Procedures/Imaging:  Ct head   Ct abd     Total time spent with chart review, patient examination/education, discussion with staff on case,documentation and medication management / adjustment  :  27 Minutes      Dr Lori Serra [facility]  Hospitalist Division  Pager: [phone]

## 2017-04-27 NOTE — ED NOTES
The Sepsis Screening has been completed on arrival in the Emergency Department.     Vital signs:  Patient Vitals for the past 4 hrs:   Temp Pulse Resp BP SpO2   04/26/17 2144 99.4 °F (37.4 °C) (!) 128 (!) 44 (!) 219/162 100 %            =monitored (data validate)  MAP (Calculated): 181    =calculated (manual entry)    Is patient is 31y/o or older, meets 2 or more ABNORMAL VITAL SIGNS below, associated with SUSPECTED INFECTION?  YES     Temperature < 96.8°F (36°C) or > 100.9°F (38.3°C)   Heart Rate > 90 beats per minute   Respiratory Rate > 20 beats per minute   BP < 90 systolic    MAP < 65    IF ANSWER IS YES, CALL A CODE SEPSIS  POC LACTIC ACID ASAP AND BEGIN NURSE DRIVEN SEPSIS ORDERS WHILE AWAITING PROVIDER

## 2017-04-27 NOTE — PROGRESS NOTES
1204: Orders and chart reviewed. RN Trudi Cazares cleared patient for treatment. Patient in bed with head of bed elevated to sitting position. Sitter present. Oral suctioning self continually throughout encounter. Eyes closed intermittently throughout. Refusing activity at this time secondary to fixation on mouth irritation. Reports living at home with boyfriend in one story home; no stairs. Independent with mobility and ADLs prior to admission. Will re-attempt for PT eval as schedule permits.      Thank you,  Rafael Blevins, PT, DPT

## 2017-04-27 NOTE — ACP (ADVANCE CARE PLANNING)
Patient has designated ___her boyfriend_____________________ to participate in his/her discharge plan and to receive any needed information. Name: Ayah Adair  Address: [address] [place] [address] [id number]  Phone number:  [phone]

## 2017-04-27 NOTE — PROGRESS NOTES
[facility]   Discharge Planning/ Assessment    Reasons for Intervention: Chart reviewed. Met with pt., verified all demographics. [facility] has [facility]. [facility] Dr. Jona Ireland is her PCP. : Nuvia Hernandez, boyfriend, with whom she lives with & designates can participate in her discharge process. States OK to speak with him. Asked her if she had any family/legal NOK, states no. States use no DME. PLAN: home with boyfriend & home health, ?personal care VS long term nursing home placement as pt has NO SNF benefit. Will cont to follow for needs. Janette HowardRN,ext. [phone].       High Risk Criteria  [x] Yes  []No   Physician Referral  [] Yes  [x]No        Date    Nursing Referral  [] Yes  [x]No        Date    Patient/Family Request  [] Yes  [x]No        Date       Resources:    Medicare  [] Yes  [x]No   Medicaid  [x] Yes  []No   No Resources  [] Yes  [x]No   Private Insurance  [] Yes  [x]No    Name/Phone Number    Other  [] Yes  [x]No        (i.e. Workman's Comp)         Prior Services:    Prior Services  [] Yes  [x]No   Home Health  [] Yes  [x]No   [address]  [] Yes  [x]No        Number of [address]  [] Yes  [x]No       Meals on Wheels  [] Yes  [x]No   Office on Aging  [] Yes  [x]No   Transportation Services  [] Yes  [x]No   Nursing Home  [] Yes  [x]No        Nursing Home Name    [address]  [] Yes  [x]No        [address] Name    Other       Information Source:      Information obtained from  [x] Patient  [] Parent   [] Kuhn Hence  [] Child  [] Spouse   [] Significant Other/Partner   [] Friend      [] EMS    [] Nursing Home Chart          [] Other:   Chart Review  [x] Yes  []No     Family/Support System:    Patient lives with  [] Alone    [] Spouse   [] Significant Other  [] Children  [] Caretaker   [] Parent  [] Sibling     [x] Other boyfriend      Other Support System:    Is the patient responsible for care of others  [] Yes  [x]No   Information of person caring for patient on  discharge    Managers financial affairs independently  [x] Yes  []No   If no, explain:      Status Prior to Admission:    Mental Status  [x] Awake  [x] Alert  [x] Oriented  [x] Quiet/Calm [] Lethargic/Sedated   [] Disoriented  [] Restless/Anxious  [] Combative   Personal Care  [] Dependent  [x] [address]  [] Requires Assistance   Meal Preparation Ability  [x] Independent   [] Standby Assistance   [] Minimal Assistance   [] Moderate Assistance  [] Maximum Assistance     [] Total Assistance   Chores  [x] Independent with Chores   [] N/A Nursing Home Resident   [] Requires Assistance   Bowel/Bladder  [x] Continent  [] Catheter  [] Incontinent  [] Ostomy Self-Care    [] Urine Diversion Self-Care  [] Maximum Assistance     [] Total Assistance   Number of Persons needed for assistance    DME at home  [] Francie Deal  [] Caridad Deal   [] Commode    [] Bathroom/Grab Bars  [] Hospital Bed  [] Nebulizer  [] Oxygen           [] Raised Toilet Seat  [] Shower Chair  [] Side Rails for Bed   [] Tub Transfer Bench   [] Yokasta Grills  [] Larance Eng, Standard      [] Other:   Vendor      Treatment Presently Receiving:    Current Treatments  [] Chemotherapy  [] Dialysis  [] Insulin  [x] IVAB [x] IVF   [x] O2  [] PCA   [x] PT   [] RT   [] Tube Feedings   [] Wound Care     Psychosocial Evaluation:    Verbalized Knowledge of Disease Process  [] Patient  []Family   Coping with Disease Process  [] Patient  []Family   Requires Further Counseling Coping with Disease Process  [] Patient  []Family     Identified Projected Needs:    Home Health Aid  [] Yes  [x]No   Transportation  [] Yes  [x]No   Education  [] Yes  [x]No        Specific Education     Financial Counseling  [] Yes  [x]No   Inability to Care for Self/Will Require 24 hour care  [] Yes  [x]No   Pain Management  [] Yes  [x]No   Home Infusion Therapy  [] Yes  [x]No   Oxygen Therapy  [] Yes  [x]No   DME  [] Yes [x]No   Long Term Care Placement  [] Yes  [x]No   Rehab  [] Yes  [x]No   Physical Therapy  [x] Yes  []No   Needs Anticipated At This Time  [x] Yes  []No     Intra-Hospital Referral:    [facility]  [] Yes  [x]No     [] Yes  [x]No   Patient Representative  [] Yes  [x]No   Staff for Teaching Needs  [] Yes  [x]No   Specialty Teaching Needs     Diabetic Educator  [] Yes  [x]No   Referral for Diabetic Educator Needed  [] Yes  [x]No  If Yes, place order for Nutritionist or Diabetic Consult     Tentative Discharge Plan:    Home with No Services  [] Yes  [x]No   Home with Home Health Follow-up  [x] Yes  []No        If Yes, specify type VS SNF   Home Care Program  [] Yes  [x]No        If Yes, specify type    Meals on Wheels  [] Yes  [x]No   Office of Aging  [] Yes  [x]No   NHP  [] Yes  [x]No   Return to the Nursing Home  [] Yes  [x]No   Rehab Therapy  [] Yes  [x]No   Acute Rehab  [] Yes  [x]No   Subacute Rehab  [] Yes  [x]No   Private Care  [] Yes  [x]No   Substance Abuse Referral  [] Yes  [x]No   Transportation  [] Yes  [x]No   Chore Service  [] Yes  [x]No   Inpatient Hospice  [] Yes  [x]No   OP RT  [] Yes  [x] No   OP Hemo  [] Yes  [x] No   OP PT  [] Yes  [x]No   Support Group  [] Yes  [x]No   Reach to Recovery  [] Yes  [x]No   OP Oncology Clinic  [] Yes  [x]No   Clinic Appointment  [] Yes  [x]No   DME  [] Yes  [x]No   Comments    Name of D/C Planner or  Given to Patient or Family August Rast   Phone Number Pager: [phone]  5347. Date [phone]   Time    If you are discharged home, whom do you designate to participate in your discharge plan and receive any information needed? Enter name of Bakari Sutherland        Phone # of designee [phone]        Address of [address] [place] [address]        Updated         Patient refused to designate any           individual

## 2017-04-27 NOTE — ED PROVIDER NOTES
HPI Comments: Kimberly Agee is a 60 y.o. Female who brought in by ems under police custody and eco for refusal to be evaluated for medical issues. Per patient she has been having diff swallowing with sensation of something in throat for last few days. Feels like she is reacting to something in her house. Also with sob, cp, nausea, occ vomiting. No diarrhea, urinary complaints. Also states she is unable to get out of bed for last 2 weeks as well. Denies fever. Unable to eat or drink anything     The history is provided by the patient, the EMS personnel, the police and medical records. Past Medical History:   Diagnosis Date    Allergic rhinitis     Angina pectoris (HCC)     Anxiety     Bronchitis     Chronic fatigue syndrome     Chronic pain     COPD (chronic obstructive pulmonary disease) (HCC)     Generalized pain     Headache, tension-type     HTN (hypertension)     Hyperlipidemia     Hypokalemia     Insomnia     Leg swelling     Lyme disease     Nicotine dependence     OA (osteoarthritis)     RA (rheumatoid arthritis) (HCC)     Vertigo     Vitamin D deficiency        Past Surgical History:   Procedure Laterality Date    HX BUNIONECTOMY  1991    left foot         Family History:   Problem Relation Age of Onset    Colon Cancer Other     Cancer Other      lung       Social History     Social History    Marital status:      Spouse name: N/A    Number of children: N/A    Years of education: N/A     Occupational History    Not on file. Social History Main Topics    Smoking status: Current Every Day Smoker     Packs/day: 0.50     Years: 30.00    Smokeless tobacco: Never Used    Alcohol use No    Drug use: No    Sexual activity: Not on file     Other Topics Concern    Not on file     Social History Narrative         ALLERGIES: Aspirin; Ibuprofen; Levaquin [levofloxacin];  Mobic [meloxicam]; Pcn [penicillins]; and Plaquenil [hydroxychloroquine]    Review of Systems Constitutional: Positive for activity change, appetite change, chills and unexpected weight change. HENT: Positive for congestion (diff breathing thru nose), drooling and trouble swallowing. Eyes: Negative for visual disturbance. Respiratory: Positive for cough, choking and shortness of breath. Cardiovascular: Positive for chest pain. Negative for leg swelling. Gastrointestinal: Negative for abdominal pain and diarrhea. Endocrine: Negative for polyuria. Genitourinary: Negative for difficulty urinating. Musculoskeletal: Positive for arthralgias and gait problem. Skin: Negative for rash and wound. Neurological: Positive for weakness (generalized). Negative for syncope. Psychiatric/Behavioral: Positive for agitation, hallucinations and sleep disturbance. The patient is nervous/anxious. All other systems reviewed and are negative. Vitals:    04/27/17 0315 04/27/17 0330 04/27/17 0345 04/27/17 0400   BP: 94/65 93/62 92/59 98/65   Pulse: 84 86 84 87   Resp: 24 22 (!) 31 29   Temp:       SpO2: 98% 99% 100% 99%   Weight:       Height:                Physical Exam   Constitutional: She is oriented to person, place, and time. She appears well-developed and well-nourished. Non-toxic appearance. She does not have a sickly appearance. She appears ill. She appears distressed. Very anxious appearing, spitting up saliva,   HENT:   Head: Normocephalic and atraumatic. Right Ear: External ear normal.   Left Ear: External ear normal.   Nose: Nose normal.   Mouth/Throat: Uvula is midline, oropharynx is clear and moist and mucous membranes are normal.   Eyes: Conjunctivae and EOM are normal. Pupils are equal, round, and reactive to light. No scleral icterus. Neck: Normal range of motion. Neck supple. No JVD present. Cardiovascular: Regular rhythm, normal heart sounds and intact distal pulses. Tachycardia present. Pulmonary/Chest: Breath sounds normal. No accessory muscle usage or stridor. Tachypnea noted. She is in respiratory distress. She has no decreased breath sounds. She has no wheezes. She has no rhonchi. Abdominal: Soft. She exhibits no distension. There is no tenderness. There is no rebound. Musculoskeletal: She exhibits no edema. Neurological: She is alert and oriented to person, place, and time. No cranial nerve deficit (3-12). She exhibits normal muscle tone. Gait normal.   Skin: Skin is warm and dry. She is not diaphoretic. Psychiatric: Her behavior is normal.   Nursing note and vitals reviewed.        [facility]  ED Course       Procedures    Vitals:  Patient Vitals for the past 12 hrs:   Temp Pulse Resp BP SpO2   04/27/17 0400 - 87 29 98/65 99 %   04/27/17 0345 - 84 (!) 31 92/59 100 %   04/27/17 0330 - 86 22 93/62 99 %   04/27/17 0315 - 84 24 94/65 98 %   04/27/17 0300 - 85 23 91/65 98 %   04/27/17 0245 - 83 26 100/68 100 %   04/27/17 0145 - 84 23 120/71 100 %   04/27/17 0130 - 83 25 101/71 100 %   04/27/17 0115 - 83 28 (!) 82/57 100 %   04/27/17 0101 - 87 26 (!) 87/54 99 %   04/27/17 0045 - 87 27 115/76 99 %   04/26/17 2330 - 90 23 (!) 112/100 100 %   04/26/17 2315 - 91 28 105/74 100 %   04/26/17 2300 - 86 19 106/61 99 %   04/26/17 2230 - 89 21 96/60 100 %   04/26/17 2215 - 94 24 107/73 98 %   04/26/17 2214 - 94 23 118/68 -   04/26/17 2144 99.4 °F (37.4 °C) (!) 128 (!) 44 (!) 219/162 100 %   04/26/17 2124 - (!) 131 (!) 47 (!) 127/102 95 %   04/26/17 2110 - - - (!) 148/127 -         Medications ordered:   Medications   oxymetazoline (AFRIN) 0.05 % nasal spray 3 Spray (3 Sprays Both Nostrils Refused 4/26/17 2203)   sodium chloride (NS) flush 5-10 mL (not administered)   cefepime (MAXIPIME) 2 g in 0.9% sodium chloride (MBP/ADV) 100 mL MBP (0 g IntraVENous IV Completed 4/26/17 2218)   LORazepam (ATIVAN) injection 0.5 mg (0.5 mg IntraVENous Given 4/26/17 2203)   diphenhydrAMINE (BENADRYL) injection 25 mg (25 mg IntraVENous Given 4/26/17 2203)   sodium chloride 0.9 % bolus infusion 1,000 mL (0 mL IntraVENous IV Completed 4/26/17 2302)   sodium chloride 0.9 % bolus infusion 2,000 mL (0 mL IntraVENous IV Completed 4/26/17 2342)   magnesium sulfate 2 g/50 ml IVPB (premix or compounded) (0 g IntraVENous IV Completed 4/26/17 2233)   vancomycin (VANCOCIN) 1,500 mg in 0.9% sodium chloride 500 mL IVPB (0 mg IntraVENous IV Completed 4/27/17 0230)   potassium chloride 10 mEq in 100 ml IVPB (0 mEq IntraVENous IV Completed 4/27/17 0146)   sodium chloride 0.9 % bolus infusion 1,000 mL (1,000 mL IntraVENous New Bag 4/27/17 0116)   vancomycin (VANCOCIN) 500 mg injection (  Given 4/27/17 0137)   0.9% sodium chloride (MBP/ADV) 0.9 % infusion (  Given 4/27/17 0137)   iopamidol (ISOVUE 300) 61 % contrast injection 100-200 mL ( IntraVENous Given 4/27/17 0227)         Lab findings:  Recent Results (from the past 12 hour(s))   CBC WITH AUTOMATED DIFF    Collection Time: 04/26/17  9:15 PM   Result Value Ref Range    WBC 13.4 (H) 4.6 - 13.2 K/uL    RBC 5.45 (H) 4.20 - 5.30 M/uL    HGB 17.0 (H) 12.0 - 16.0 g/dL    HCT 45.8 (H) 35.0 - 45.0 %    MCV 84.0 74.0 - 97.0 FL    MCH 31.2 24.0 - 34.0 PG    MCHC 37.1 (H) 31.0 - 37.0 g/dL    RDW 13.0 11.6 - 14.5 %    PLATELET 571 483 - 498 K/uL    MPV 10.9 9.2 - 11.8 FL    NEUTROPHILS 58 40 - 73 %    LYMPHOCYTES 34 21 - 52 %    MONOCYTES 8 3 - 10 %    EOSINOPHILS 0 0 - 5 %    BASOPHILS 0 0 - 2 %    ABS. NEUTROPHILS 7.7 1.8 - 8.0 K/UL    ABS. LYMPHOCYTES 4.6 (H) 0.9 - 3.6 K/UL    ABS. MONOCYTES 1.1 0.05 - 1.2 K/UL    ABS. EOSINOPHILS 0.0 0.0 - 0.4 K/UL    ABS.  BASOPHILS 0.0 0.0 - 0.06 K/UL    DF AUTOMATED     MAGNESIUM    Collection Time: 04/26/17  9:15 PM   Result Value Ref Range    Magnesium 2.4 1.6 - 2.6 mg/dL   METABOLIC PANEL, COMPREHENSIVE    Collection Time: 04/26/17  9:15 PM   Result Value Ref Range    Sodium 132 (L) 136 - 145 mmol/L    Potassium 1.9 (LL) 3.5 - 5.5 mmol/L    Chloride 89 (L) 100 - 108 mmol/L    CO2 25 21 - 32 mmol/L    Anion gap 18 3.0 - 18 mmol/L    Glucose 139 (H) 74 - 99 mg/dL    BUN 21 (H) 7.0 - 18 MG/DL    Creatinine 1.17 0.6 - 1.3 MG/DL    BUN/Creatinine ratio 18 12 - 20      GFR est AA 58 (L) >60 ml/min/1.73m2    GFR est non-AA 48 (L) >60 ml/min/1.73m2    Calcium 9.6 8.5 - 10.1 MG/DL    Bilirubin, total 2.6 (H) 0.2 - 1.0 MG/DL    ALT (SGPT) 230 (H) 13 - 56 U/L    AST (SGOT) 103 (H) 15 - 37 U/L    Alk.  phosphatase 137 (H) 45 - 117 U/L    Protein, total 7.7 6.4 - 8.2 g/dL    Albumin 3.9 3.4 - 5.0 g/dL    Globulin 3.8 2.0 - 4.0 g/dL    A-G Ratio 1.0 0.8 - 1.7     TSH 3RD GENERATION    Collection Time: 04/26/17  9:15 PM   Result Value Ref Range    TSH 1.84 0.36 - 3.74 uIU/mL   D DIMER    Collection Time: 04/26/17  9:15 PM   Result Value Ref Range    D DIMER <0.27 <0.46 ug/ml(FEU)   CARDIAC PANEL,(CK, CKMB & TROPONIN)    Collection Time: 04/26/17  9:15 PM   Result Value Ref Range     (H) 26 - 192 U/L    CK - MB 1.8 <3.6 ng/ml    CK-MB Index 0.8 0.0 - 4.0 %    Troponin-I, Qt. <0.02 0.0 - 9.797 NG/ML   SALICYLATE    Collection Time: 04/26/17  9:15 PM   Result Value Ref Range    SALICYLATE <4.9 (L) 2.8 - 20.0 MG/DL   ETHYL ALCOHOL    Collection Time: 04/26/17  9:15 PM   Result Value Ref Range    ALCOHOL(ETHYL),SERUM <3 0 - 3 MG/DL   ACETAMINOPHEN    Collection Time: 04/26/17  9:30 PM   Result Value Ref Range    ACETAMINOPHEN <2 (L) 10 - 30 ug/mL   POC LACTIC ACID    Collection Time: 04/26/17  9:34 PM   Result Value Ref Range    Lactic Acid (POC) 10.6 (HH) 0.4 - 2.0 mmol/L   URINALYSIS W/ RFLX MICROSCOPIC    Collection Time: 04/26/17  9:40 PM   Result Value Ref Range    Color DARK YELLOW      Appearance CLEAR      Specific gravity >1.030 (H) 1.005 - 1.030    pH (UA) 5.5 5.0 - 8.0      Protein TRACE (A) NEG mg/dL    Glucose 100 (A) NEG mg/dL    Ketone NEGATIVE  NEG mg/dL    Bilirubin MODERATE (A) NEG      Blood TRACE (A) NEG      Urobilinogen 1.0 0.2 - 1.0 EU/dL    Nitrites POSITIVE (A) NEG      Leukocyte Esterase SMALL (A) NEG     DRUG SCREEN, URINE    Collection Time: 04/26/17  9:40 PM   Result Value Ref Range    BENZODIAZEPINE NEGATIVE  NEG      BARBITURATES NEGATIVE  NEG      THC (TH-CANNABINOL) NEGATIVE  NEG      OPIATES NEGATIVE  NEG      PCP(PHENCYCLIDINE) NEGATIVE  NEG      COCAINE NEGATIVE  NEG      AMPHETAMINE NEGATIVE  NEG      METHADONE NEGATIVE       HDSCOM (NOTE)    URINE MICROSCOPIC ONLY    Collection Time: 04/26/17  9:40 PM   Result Value Ref Range    WBC 8 to 10 0 - 4 /hpf    RBC 0 0 - 5 /hpf    Epithelial cells FEW 0 - 5 /lpf    Bacteria 1+ (A) NEG /hpf   POC LACTIC ACID    Collection Time: 04/26/17  9:49 PM   Result Value Ref Range    Lactic Acid (POC) 9.1 (HH) 0.4 - 2.0 mmol/L   EKG, 12 LEAD, INITIAL    Collection Time: 04/26/17  9:52 PM   Result Value Ref Range    Ventricular Rate 121 BPM    Atrial Rate 121 BPM    P-R Interval 126 ms    QRS Duration 80 ms    Q-T Interval 424 ms    QTC Calculation (Bezet) 602 ms    Calculated P Axis 42 degrees    Calculated R Axis 56 degrees    Calculated T Axis 90 degrees    Diagnosis       Sinus tachycardia  Possible Left atrial enlargement  Septal infarct , age undetermined  ST & T wave abnormality, consider inferior ischemia  ST & T wave abnormality, consider anterolateral ischemia  Abnormal ECG  When compared with ECG of 02-OCT-2016 02:28,  Septal infarct is now present  Non-specific change in ST segment in Anterior leads  T wave inversion now evident in Inferior leads  T wave inversion now evident in Anterolateral leads     POC LACTIC ACID    Collection Time: 04/27/17  1:12 AM   Result Value Ref Range    Lactic Acid (POC) 2.6 (HH) 0.4 - 2.0 mmol/L       EKG interpretation by ED Physician:  Sinus tach with ns st tw abnl  Rate 121, qtc 602  Different from previous 2016  X-Ray, CT or other radiology findings or impressions:  XR CHEST PORT    (Results Pending)   CT HEAD WO CONT    (Results Pending)   CT ABD PELV W CONT    (Results Pending)   cxr with nap per my interp  Ct head with nap per prelim read  Ct abd/pel with no acute findings    Progress notes, Consult notes or additional Procedure notes:   Lactate improved. Good skin perfusion, bp improved  Suspect her lactic acidosis more sec to severe volume depletion/respiratory issue/tachypnea vs infection  May have mild uti which was treated. Given prior listed allergies to pcn, levaquin, abx ordered chosen  Pt also with underlying mental health disorder which will require inpt psych evaluation     D/w dr Ashley De La Torre on call for hospitalist who will admit    ED Critical Care Note    System at risk for life threatening failure: cardiac, renal, neuro, psych  Associated problems:metabolic, acidosis, leukocytos, infection    Critical Care services provided: bedside management, consult, documentation  Excluded procedures (time not included in critical care): ecg interp    Total Critical Care Time (in minutes) 48                  Reevaluation of patient:   Stable for admission, improved    Disposition:  Diagnosis:   1. Acute hypokalemia    2. Lactic acidosis    3. Dehydration    4. Dysphagia, unspecified type        Disposition: admit      Follow-up Information     None            Patient's Medications   Start Taking    No medications on file   Continue Taking    ACETAMINOPHEN (TYLENOL) 325 MG TABLET    Take 2 Tabs by mouth every four (4) hours as needed. Indications: BACK PAIN    ALBUTEROL (PROAIR HFA) 90 MCG/ACTUATION INHALER    Take 1 Puff by inhalation. ATENOLOL (TENORMIN) 50 MG TABLET    Take 1 Tab by mouth daily. Indications: HYPERTENSION    CITALOPRAM (CELEXA) 20 MG TABLET    Take 1 Tab by mouth daily. Indications: MAJOR DEPRESSIVE DISORDER    CLONAZEPAM (KLONOPIN) 1 MG TABLET    Take 1 Tab by mouth nightly. Max Daily Amount: 1 mg. Indications: PANIC DISORDER    DESLORATADINE (CLARINEX) 5 MG TABLET    Take 5 mg by mouth daily. DEXTROMETHORPHAN-GUAIFENESIN (MUCINEX DM)  MG PER TABLET    Take 1 Tab by mouth two (2) times a day.     FAMOTIDINE (PEPCID) 40 MG TABLET    Take 40 mg by mouth daily. FAMOTIDINE (PEPCID) 40 MG TABLET    Take 1 Tab by mouth daily. Indications: DYSPEPSIA, HEARTBURN PREVENTION    FLUTICASONE (FLONASE) 50 MCG/ACTUATION NASAL SPRAY    1 [place] by Both Nostrils route daily. LORATADINE (CLARITIN) 10 MG TABLET    Take 1 Tab by mouth daily. Indications: ALLERGIC RHINITIS    MIRTAZAPINE (REMERON) 7.5 MG TABLET    Take 1 Tab by mouth nightly. Indications: MAJOR DEPRESSIVE DISORDER    NITROGLYCERIN (NITROSTAT) 0.4 MG SL TABLET    by SubLINGual route every five (5) minutes as needed. POTASSIUM CHLORIDE (KLOR-CON M10) 10 MEQ TABLET    Take  by mouth two (2) times a day. PSEUDOEPHEDRINE (SUDAFED) 30 MG TABLET    Take 1 Tab by mouth every six (6) hours as needed for Congestion. Indications: NASAL CONGESTION    QUETIAPINE (SEROQUEL) 100 MG TABLET    Take 1 Tab by mouth nightly. Indications: DEPRESSION TREATMENT ADJUNCT    TIZANIDINE (ZANAFLEX) 4 MG TABLET    Take 1-2 Tabs by mouth three (3) times daily. As needed for muscle spasms    TIZANIDINE (ZANAFLEX) 4 MG TABLET    One tablet every 6 hours as needed for muscle spasm  Indications:  MUSCLE SPASM   These Medications have changed    No medications on file   Stop Taking    No medications on file

## 2017-04-27 NOTE — ED NOTES
Pt's significant other Elba Camejo called to check on patient. He and patient live together.  Contact number [phone]

## 2017-04-27 NOTE — PROGRESS NOTES
0432-Received pt. Admission assessment completed. Skin intact, pt alert oriented X 3. Gripper sock applied, tele box # [id number]. Instructed pt to call for assistance prior to getting out of bed. Call bell within reach. 0600-Admission database completed. IVF infusing. Call bell within reach    0618-Received order for tramadol 50 mg q6hrs prn.     0714-Received critical result for potassium of 2.4. Previous potassium is 1.9  Dr. Jacquelyn Figueroa informed about pts potassium level    Bedside and Verbal shift change report given to madelin GRACIA (oncoming nurse) by Levander Bumpers RN (offgoing nurse).  Report included the following information SBAR, Kardex, Intake/Output and MAR

## 2017-04-27 NOTE — ROUTINE PROCESS
0745 Patient was seen during shift change report, eyes closed and equal non-labored breathing on room air. IVF infusing and sitter at bedside. 0830 Patient awake and spitting a lot of oral secretions. Oral care done. 1100 Yankuer suction provided to the Patient for oral secretion suctioning. Patient advised on NPO until seen by SLP. IVF replaced with D5-0.45NaCl with KCl 40mEq/L at 125 cc/hr. Sitter at bedside. 1400 Patient asleep most of the time or eyes closed and equal regular breathing. Once she's awake she do hyperventilation. Encouraged to do deep breathing. 1630 Patient resting in the bed. 1730 Refused to eat dinner, just drink apple juice and ate ice cream.  Patient encouraged. 1935 Bedside and Verbal shift change report given to Kristy Membreno RN (oncoming nurse) by Dorys Pavon RN (offgoing nurse). Report included the following information SBAR, Kardex, Intake/Output, MAR, Recent Results and Cardiac Rhythm NSR.

## 2017-04-27 NOTE — PROGRESS NOTES
Nutrition initial assessment/  Plan of care      RECOMMENDATIONS:     1. Full liquids per SLP  2. Ensure BID  3. Monitor weight, labs and PO intake  4. RD to follow     GOALS:     1. PO intake meets >75% of protein/calorie needs by 5/3  2. Weight Maintenance (+/- 1-2 lb by 5/5)       ASSESSMENT:     Weight status is classified as overweight per BMI of 25.9. PO intake is poor. Added Ensure BID for additional calories/protein. Labs noted. Patient with hypokalemia. Nutrition recommendations listed. RD to follow. Nutrition Diagnoses:   Unintentional weight loss related to poor appetite as evidenced by 8.5% weight loss in past seven months per documented weights. Nutrition Risk:  [] High  [x] Moderate []  Low    SUBJECTIVE/OBJECTIVE:      Patient admitted with AMS. Per chart review, patient has refused to eat or drink for past 5 days. Reviewed SLP note. Patient with mild dysphagia in the setting of AMS and recommends full liquids. S/P MBS this morning. Patient sleeping most of day. Per sitter, patient with 25% intake of breakfast tray and declined to eat lunch tray. Will add Ensure supplements to help meet nutrition needs. Being followed by psych. Will monitor. Information Obtained from:    [x] Chart Review   [x] Patient   [] Family/Caregiver   [x] Nurse/Physician   [] Interdisciplinary Meeting/Rounds    Diet: Full liquids  Medications: [x] Reviewed    Allergies: [x] Reviewed   Encounter Diagnoses     ICD-10-CM ICD-9-CM   1. Acute hypokalemia E87.6 276.8   2. Lactic acidosis E87.2 276.2   3. Dehydration E86.0 276.51   4.  Dysphagia, unspecified type R13.10 787.20     Past Medical History:   Diagnosis Date    Allergic rhinitis     Angina pectoris (HCC)     Anxiety     Bronchitis     Chronic fatigue syndrome     Chronic pain     COPD (chronic obstructive pulmonary disease) (HCC)     Generalized pain     Headache, tension-type     HTN (hypertension)     Hyperlipidemia     Hypokalemia     Insomnia  Leg swelling     Lyme disease     Nicotine dependence     OA (osteoarthritis)     RA (rheumatoid arthritis) (HCC)     Vertigo     Vitamin D deficiency       Labs:    Lab Results   Component Value Date/Time    Sodium 137 04/28/2017 04:30 AM    Potassium 2.8 04/28/2017 04:30 AM    Chloride 104 04/28/2017 04:30 AM    CO2 23 04/28/2017 04:30 AM    Anion gap 10 04/28/2017 04:30 AM    Glucose 105 04/28/2017 04:30 AM    BUN 6 04/28/2017 04:30 AM    Creatinine 0.35 04/28/2017 04:30 AM    Calcium 7.2 04/28/2017 04:30 AM    Magnesium 2.4 04/26/2017 09:15 PM    Albumin 2.7 04/27/2017 05:45 AM     Anthropometrics: BMI (calculated): 25.9  Last 3 Recorded Weights in this Encounter    04/26/17 2144 04/27/17 0933   Weight: 68.6 kg (151 lb 3.2 oz) 68.6 kg (151 lb 3.2 oz)      Ht Readings from Last 1 Encounters:   04/27/17 5' 4\" (1.626 m)     No data found. IBW: 120 lb %IBW: 126%    Estimated Nutrition Needs: [x] MSJ  [] Other:  Calories: 7128-6637 Kcal Based on:   [x] Actual BW    Protein:   70-82 g Based on:   [x] Actual BW    Fluid:       2200 ml Based on:   [x] Actual BW      [x] No Cultural, Yarsani or ethnic dietary need identified.     [] Cultural, Yarsani and ethnic food preferences identified and addressed     Wt Status:  [] Normal (18.6 - 24.9) [] Underweight (<18.5)   [x] Overweight (25 - 29.9) [] Mild Obesity (30 - 34.9)  [] Moderate Obesity (35 - 39.9) [] Morbid Obesity (40+)     Nutrition Problems Identified:   [x] Suboptimal PO intake   [] Food Allergies  [x] Difficulty chewing/swallowing/poor dentition  [] Constipation/Diarrhea   [] Nausea/Vomiting   [] None  [] Other:     Plan:   [x] Therapeutic Diet  []  Obtained/adjusted food preferences/tolerances and/or snacks options   [x]  Supplements added   [x] Occupational therapy following for feeding techniques  []  HS snack added   []  Modify diet texture   []  Modify diet for food allergies   []  Assist with menu selection   [x]  Monitor PO intake on meal rounds   [x]  Continue inpatient monitoring and intervention   []  Participated in discharge planning/Interdisciplinary rounds/Team meetings   []  Other:     Education Needs:   [x] Not appropriate for teaching at this time    [] Identified and addressed    Nutrition Monitoring and Evaluation:  [x] Continue ongoing monitoring and intervention  [] Other    Hunter Breeding

## 2017-04-27 NOTE — PROGRESS NOTES
Problem: Dysphagia (Adult)  Goal: *Acute Goals and Plan of Care (Insert Text)  Dysphagia Present: questionable  Aspiration: at risk     Recommendations:  Diet: full liquids   Meds: one at a time  Aspiration Precautions  Oral Care TID  Other: MBS next am    Goals: Patient will:  1. Tolerate PO trials with 0 s/s overt distress in 4/5 trials  2. Utilize compensatory swallow strategies/maneuvers (decrease bite/sip, size/rate, alt. liq/sol) with min cues in 4/5 trials  3. Complete an objective swallow study (i.e., MBSS) to assess swallow integrity, r/o aspiration, and determine of safest LRD, min A  SPEECH LANGUAGE PATHOLOGY BEDSIDE SWALLOW EVALUATION     Patient: Yamile Brown (13 y.o. female)  Date: 4/27/2017  Primary Diagnosis: Hypokalemia  UTI (urinary tract infection)  Dehydration  Encephalopathy  Polycythemia (HCC)  Elevated liver enzymes  Lactic acidosis  Leukocytosis        Precautions: aspiration         ASSESSMENT :  Based on the objective data described below, the patient presents with mild OP dysphagia in the setting of AMS. Sitter at b/s. Pt reports \"I have something in my mouth; it's like sand and all in my mouth. \". OM exam revealed nothing remarkable in oral cavity; structures grossly intact for mastication/deglutition. Pt accepted thin liquids with straw with immediately holding in oral cavity. When cued to swallow, pt regurgitated prior to swallow and suctioned self; more behavioral in nature. Subsequent trials of thin and nectar with holding ~ 5 seconds; no aspiration s/s after swallow. Applesauce (x 4 bites) accepted WNL for both oral and pharyngeal phases. SLP educated pt to take small sips for easier swallow; needs requirement. SLP recommending full liquids with MBS next am to assess swallow integrity and rule-out aspiration. D/w RN, Davis Flores and Dr. Alcon Chery during interdisciplinary rounds.  SLP ensured oral clearance at end of eval.     Patient will benefit from skilled intervention to address the above impairments. Patients rehabilitation potential is considered to be Fair  Factors which may influence rehabilitation potential include:   [ ]            None noted  [X]            Mental ability/status  [X]            Medical condition  [X]            Home/family situation and support systems  [X]            Safety awareness  [ ]            Pain tolerance/management  [ ]            Other:        PLAN :  Recommendations and Planned Interventions:  Full liquids/MBS next am  Frequency/Duration: Patient will be followed by speech-language pathology 1-2 times per day/4-7 days per week to address goals. Discharge Recommendations: Skilled Nursing Facility       SUBJECTIVE:   Patient stated There's something in there [address].       OBJECTIVE:       Past Medical History:   Diagnosis Date    Allergic rhinitis      Angina pectoris (HCC)      Anxiety      Bronchitis      Chronic fatigue syndrome      Chronic pain      COPD [facility]ch[facility] obstructive [facility] disease) [facility])      Generalized pain      Headache, tension-type      HTN (hypertension)      Hyperlipidemia      Hypokalemia      Insomnia      Leg swelling      Lyme disease      Nicotine dependence      OA (osteoarthritis)      RA (rheumatoid arthritis) ([facility])      Vertigo      Vitamin D deficiency       Past Surgical History:   Procedure Laterality Date    HX BUNIONECTOMY   1991     left foot     Prior Level of Function/Home Situation: lives with family  Home Situation  Home Environment: Private residence  One/Two Story Residence: One story  Living Alone: No  Support Systems: Other (comments) (SSI)  Patient Expects to be Discharged to[de-identified] Private residence  Current DME Used/Available at Home: None  Diet prior to admission: regular  Current Diet:  Full liquids   Cognitive and Communication Status:  Neurologic State: Alert  Orientation Level: Oriented to person, Oriented to place, Oriented to situation  Cognition: Follows commands  Perception: Appears intact  Perseveration: Perseverates during conversation  Safety/Judgement: Fall prevention  Oral Assessment:  Oral Assessment  Labial: No impairment  Dentition: Natural;Limited  Oral Hygiene: Fair  Lingual: No impairment  Velum: No impairment  Mandible: No impairment  P.O. Trials:  Patient Position: [facility]  Vocal quality prior to P.O.: No impairment  Consistency Presented: Thin liquid; Nectar thick liquid;Pudding  How Presented: Self-fed/presented;SLP-fed/presented;Straw;Spoon     Bolus Acceptance: No impairment  Bolus Formation/Control: Impaired  Type of Impairment: Delayed;Mastication;Oral regurgitation  Propulsion: Delayed (# of seconds)  Oral Residue: None  Initiation of Swallow: Delayed (# of seconds)  Laryngeal Elevation: Functional  Aspiration Signs/Symptoms: None  Pharyngeal Phase Characteristics: Feeling of discomfort  Effective Modifications: None  Cues for Modifications: Maximal        Oral Phase Severity: Mild  Pharyngeal Phase Severity : Mild     GCODESwallowing:  Swallow Current Status CJ= 20-39%   Swallow Goal Status CI= 1-19%     The severity rating is based on the following outcomes:  PAUL Noms Swallow Level 5              Clinical Judgement     PAIN:  Start of Eval: 2  End of Eval: 2      After treatment:   [ ]            Patient left in no apparent distress sitting up in chair  [X]            Patient left in no apparent distress in bed  [X]            Call bell left within reach  [X]            Nursing notified  [ ]            Family present  [X]            Caregiver present  [ ]            Bed alarm activated      COMMUNICATION/EDUCATION:   [X]            Aspiration precautions; swallow safety; compensatory techniques. [ ]            Patient/family have participated as able in goal setting and plan of care. [ ]            Patient/family agree to work toward stated goals and plan of care. [ ]            Patient understands intent and goals of therapy; neutral about participation.   [X] Patient unable to participate in goal setting/plan of care; educ ongoing with interdisciplinary staff  [ ]             Posted safety precautions in patient's room.      Thank you for this referral.  ROMY Randolph  Time Calculation: 25 mins

## 2017-04-27 NOTE — PROGRESS NOTES
1410: Hold PT eval for today secondary to AMS noted in chart; patient not appropriate for PT eval at this time. Will re-attempt tomorrow.      Thank you,   Rhea Marc, PT, DPT

## 2017-04-27 NOTE — ED NOTES
Patient arrives tachypneic and tachycardic. Patient is not handling secretions, but denies sore throat. No airway obstruction evident on assessment. Incontinent of urine with foul body odor and disheveled appearance.

## 2017-04-27 NOTE — ED NOTES
Notified by Primary RN and Triaging RN of presence of indicators for Code Sepsis. Attending MD aware.

## 2017-04-27 NOTE — PROGRESS NOTES
OT order received and chart reviewed. Per chart review and discussion with therapy team, holding OT evaluation for today secondary to AMS noted during IDR. Will continue to follow patient. Thank you.     Lissette Cha MS OTR/L  Office Ext: [phone]  Pager: [phone]

## 2017-04-27 NOTE — PROGRESS NOTES
conducted an initial consultation and Spiritual Assessment for [employer] Mindi, who is a 60 y.o.,female. Patients Primary Language is: [place]. According to the patients EMR [facility] Affiliation is: Other. The reason the Patient came to the hospital is:   Patient Active Problem List    Diagnosis Date Noted    Dehydration 04/27/2017    Hypokalemia 04/27/2017    Lactic acidosis 04/27/2017    Polycythemia ([address] 04/27/2017    Leukocytosis 04/27/2017    UTI (urinary tract infection) 04/27/2017    Encephalopathy 04/27/2017    Elevated liver enzymes 04/27/2017    Depression 10/03/2016    Muscle spasms of neck 04/25/2016    RLS (restless legs syndrome) 04/25/2016    Drug-induced constipation 08/03/2015    Chronic headache disorder 07/27/2013    Enthesopathy of hip region 04/09/2013    Tobacco abuse 04/09/2013    Post-Lyme disease syndrome 12/10/2011    Pain in joint, multiple sites 12/10/2011    Cervicalgia 12/10/2011    Chronic low back pain 12/10/2011    Migraine without aura 12/10/2011    Myalgia and myositis 12/10/2011    Encounter for long-term (current) use of high-risk medication 12/10/2011        The  provided the following Interventions:  Initiated a relationship of care and support. Explored issues of ashley, belief, spirituality and Scientologist/ritual needs while hospitalized. Listened empathically. Provided chaplaincy education. Provided information about Spiritual Care Services. Offered prayer and assurance of continued prayers on patient's behalf. Chart reviewed. The following outcomes were achieved:  Patient shared limited information about both their medical narrative and spiritual journey/beliefs. Patient processed feeling about current hospitalization. Patient expressed gratitude for the 's visit. Assessment:  Patient does not have any Scientologist/cultural needs that will affect patients preferences in health care.   Patient did not indicate any spiritual or Mandaeism issues which require Spiritual Care Services interventions at this time. Plan:  Chaplains will continue to follow and will provide pastoral care on an as needed/requested basis.  recommends bedside caregivers page  on duty if patient shows signs of acute spiritual or emotional distress.     [address] [facility]   Staff [address] [facility]   [phone]

## 2017-04-27 NOTE — ED TRIAGE NOTES
Female arrives to ED by EMS, female with altered mental status, EMS reports pt has not been out of bed in 2 weeks.

## 2017-04-27 NOTE — H&P
History and Physical    Patient: Juliana Schaefer               Sex: female          DOA: 4/26/2017       YOB: 1960      Age:  60 y.o.        LOS:  LOS: 0 days        Chief Complaint   Patient presents with    Altered mental status     decreased PO, incontinent, weakness         HPI:     Juliana Schaefer is a 60 y.o. female who presents with c/o AMS. Patient was brought in by the police for FTT . Per ED report patient has refused to eat or drink for 5 days and has not been acting her normal self. Patient believes the is stuff in her mouth and object floating in the air. In the ED she was found to have severe hypokalemia with K 1.9 and UTI. She was given 4 runs of KCL 10 MEQ and started on antibiotics and IVF bolus for severe dehydration. Patient reports generalized pain with no hx trauma. CT head was negative. CT abdomen and pelvis was negative for any acute findings. Patient had lactic acidosis 9.1 which improved after Bolus IVF. Patient will be admitted for further management. Past Medical History:   Diagnosis Date    Allergic rhinitis     [facility]ina pectoris ([facility])     Anxiety     Bronchitis     Chronic fatigue syndrome     Chronic pain     COPD (chronic obstructive pulmonary disease) ([facility])     Generalized pain     Headache, tension-type     HTN (hypertension)     Hyperlipidemia     Hypokalemia     Insomnia     Leg swelling     Lyme disease     Nicotine dependence     OA (osteoarthritis)     RA (rheumatoid arthritis) ([facility])     Vertigo     Vitamin D deficiency    . Past Surgical History:   Procedure Laterality Date    HX BUNIONECTOMY  1991    left foot       No current facility-administered medications on file prior to encounter. Current Outpatient Prescriptions on File Prior to Encounter   Medication Sig Dispense Refill    acetaminophen (TYLENOL) 325 mg tablet Take 2 Tabs by mouth every four (4) hours as needed.  Indications: BACK PAIN 60 Tab 0    atenolol (TENORMIN) 50 mg tablet Take 1 Tab by mouth daily. Indications: HYPERTENSION 30 Tab 0    citalopram (CELEXA) 20 mg tablet Take 1 Tab by mouth daily. Indications: MAJOR DEPRESSIVE DISORDER 30 Tab 0    clonazePAM (KLONOPIN) 1 mg tablet Take 1 Tab by mouth nightly. Max Daily Amount: 1 mg. Indications: PANIC DISORDER 30 Tab 0    famotidine (PEPCID) 40 mg tablet Take 1 Tab by mouth daily. Indications: DYSPEPSIA, HEARTBURN PREVENTION 30 Tab 0    loratadine (CLARITIN) 10 mg tablet Take 1 Tab by mouth daily. Indications: ALLERGIC RHINITIS 30 Tab 0    mirtazapine (REMERON) 7.5 mg tablet Take 1 Tab by mouth nightly. Indications: MAJOR DEPRESSIVE DISORDER 30 Tab 0    pseudoephedrine (SUDAFED) 30 mg tablet Take 1 Tab by mouth every six (6) hours as needed for Congestion. Indications: NASAL CONGESTION 60 Tab 0    QUEtiapine (SEROQUEL) 100 mg tablet Take 1 Tab by mouth nightly. Indications: DEPRESSION TREATMENT ADJUNCT 30 Tab 0    tiZANidine (ZANAFLEX) 4 mg tablet One tablet every 6 hours as needed for muscle spasm  Indications: MUSCLE SPASM 90 Tab 0    fluticasone (FLONASE) 50 mcg/actuation nasal spray 1 [place] by Both Nostrils route daily. 1 Bottle 0    tiZANidine (ZANAFLEX) 4 mg tablet Take 1-2 Tabs by mouth three (3) times daily. As needed for muscle spasms 180 Tab 2    famotidine (PEPCID) 40 mg tablet Take 40 mg by mouth daily.  nitroglycerin (NITROSTAT) 0.4 mg SL tablet by SubLINGual route every five (5) minutes as needed.  desloratadine (CLARINEX) 5 mg tablet Take 5 mg by mouth daily.  potassium chloride (KLOR-CON M10) 10 mEq tablet Take  by mouth two (2) times a day.  albuterol (PROAIR HFA) 90 mcg/Actuation inhaler Take 1 Puff by inhalation.  dextromethorphan-guaifenesin (MUCINEX DM)  mg per tablet Take 1 Tab by mouth two (2) times a day.          Social History     Social History    Marital status:      Spouse name: N/A    Number of children: N/A    Years of education: N/A Occupational History    Not on file. Social History Main Topics    Smoking status: Current Every Day Smoker     Packs/day: 0.50     Years: 30.00    Smokeless tobacco: Never Used    Alcohol use No    Drug use: No    Sexual activity: Not on file     Other Topics Concern    Not on file     Social History Narrative       Prior to Admission Medications   Prescriptions Last Dose Informant Patient Reported? Taking? QUEtiapine (SEROQUEL) 100 mg tablet Not Taking at Unknown time  No No   Sig: Take 1 Tab by mouth nightly. Indications: DEPRESSION TREATMENT ADJUNCT   acetaminophen (TYLENOL) 325 mg tablet Not Taking at Unknown time  No No   Sig: Take 2 Tabs by mouth every four (4) hours as needed. Indications: BACK PAIN   albuterol (PROAIR HFA) 90 mcg/Actuation inhaler Not Taking at Unknown time  Yes No   Sig: Take 1 Puff by inhalation. atenolol (TENORMIN) 50 mg tablet Not Taking at Unknown time  No No   Sig: Take 1 Tab by mouth daily. Indications: HYPERTENSION   citalopram (CELEXA) 20 mg tablet Not Taking at Unknown time  No No   Sig: Take 1 Tab by mouth daily. Indications: MAJOR DEPRESSIVE DISORDER   clonazePAM (KLONOPIN) 1 mg tablet Not Taking at Unknown time  No No   Sig: Take 1 Tab by mouth nightly. Max Daily Amount: 1 mg. Indications: PANIC DISORDER   desloratadine (CLARINEX) 5 mg tablet Not Taking at Unknown time  Yes No   Sig: Take 5 mg by mouth daily. dextromethorphan-guaifenesin (MUCINEX DM)  mg per tablet Not Taking at Unknown time  Yes No   Sig: Take 1 Tab by mouth two (2) times a day. famotidine (PEPCID) 40 mg tablet Not Taking at Unknown time  Yes No   Sig: Take 40 mg by mouth daily. famotidine (PEPCID) 40 mg tablet Not Taking at Unknown time  No No   Sig: Take 1 Tab by mouth daily. Indications: DYSPEPSIA, HEARTBURN PREVENTION   fluticasone (FLONASE) 50 mcg/actuation nasal spray Not Taking at Unknown time  No No   [place] by Both Nostrils route daily.    loratadine (CLARITIN) 10 mg tablet Not Taking at Unknown time  No No   Sig: Take 1 Tab by mouth daily. Indications: ALLERGIC RHINITIS   mirtazapine (REMERON) 7.5 mg tablet Not Taking at Unknown time  No No   Sig: Take 1 Tab by mouth nightly. Indications: MAJOR DEPRESSIVE DISORDER   nitroglycerin (NITROSTAT) 0.4 mg SL tablet Not Taking at Unknown time  Yes No   Sig: by SubLINGual route every five (5) minutes as needed. potassium chloride (KLOR-CON M10) 10 mEq tablet Not Taking at Unknown time  Yes No   Sig: Take  by mouth two (2) times a day. pseudoephedrine (SUDAFED) 30 mg tablet Not Taking at Unknown time  No No   Sig: Take 1 Tab by mouth every six (6) hours as needed for Congestion. Indications: NASAL CONGESTION   tiZANidine (ZANAFLEX) 4 mg tablet Not Taking at Unknown time  No No   Sig: Take 1-2 Tabs by mouth three (3) times daily. As needed for muscle spasms   tiZANidine (ZANAFLEX) 4 mg tablet Not Taking at Unknown time  No No   Sig: One tablet every 6 hours as needed for muscle spasm  Indications: MUSCLE SPASM      Facility-Administered Medications: None       Family History   Problem Relation Age of Onset    Colon Cancer Other     Cancer Other      lung       Review of Systems: Unable to obtain due to AMS      Physical Exam:       Visit Vitals    /71    Pulse 84    Temp 99.4 °F (37.4 °C)    Resp 23    Ht 5' 4\" (1.626 m)    Wt 68.6 kg (151 lb 3.2 oz)    SpO2 100%    BMI 25.95 kg/m2       Physical Exam   Constitutional: She is oriented to person, place, and time. She appears well-developed and well-nourished. She has a sickly appearance. No distress. HENT:   Head: Normocephalic and atraumatic. Mouth/Throat: Mucous membranes are dry. Eyes: Conjunctivae and EOM are normal. Pupils are equal, round, and reactive to light. No scleral icterus. Neck: Neck supple. Cardiovascular: Normal rate, regular rhythm and normal heart sounds. No murmur heard.   Pulmonary/Chest: Effort normal and breath sounds normal. No respiratory distress. She has no wheezes. She has no rales. Abdominal: Soft. She exhibits no distension. There is no tenderness. There is no guarding. Musculoskeletal: She exhibits no edema. Neurological: She is alert and oriented to person, place, and time. Skin: Skin is warm and dry. No rash noted. She is not diaphoretic. No erythema. No pallor. Ancillary Studies: All lab and imaging reviewed for the past 24 hours. Recent Results (from the past 24 hour(s))   CBC WITH AUTOMATED DIFF    Collection Time: 04/26/17  9:15 PM   Result Value Ref Range    WBC 13.4 (H) 4.6 - 13.2 K/uL    RBC 5.45 (H) 4.20 - 5.30 M/uL    HGB 17.0 (H) 12.0 - 16.0 g/dL    HCT 45.8 (H) 35.0 - 45.0 %    MCV 84.0 74.0 - 97.0 FL    MCH 31.2 24.0 - 34.0 PG    MCHC 37.1 (H) 31.0 - 37.0 g/dL    RDW 13.0 11.6 - 14.5 %    PLATELET 701 379 - 886 K/uL    MPV 10.9 9.2 - 11.8 FL    NEUTROPHILS 58 40 - 73 %    LYMPHOCYTES 34 21 - 52 %    MONOCYTES 8 3 - 10 %    EOSINOPHILS 0 0 - 5 %    BASOPHILS 0 0 - 2 %    ABS. NEUTROPHILS 7.7 1.8 - 8.0 K/UL    ABS. LYMPHOCYTES 4.6 (H) 0.9 - 3.6 K/UL    ABS. MONOCYTES 1.1 0.05 - 1.2 K/UL    ABS. EOSINOPHILS 0.0 0.0 - 0.4 K/UL    ABS.  BASOPHILS 0.0 0.0 - 0.06 K/UL    DF AUTOMATED     MAGNESIUM    Collection Time: 04/26/17  9:15 PM   Result Value Ref Range    Magnesium 2.4 1.6 - 2.6 mg/dL   METABOLIC PANEL, COMPREHENSIVE    Collection Time: 04/26/17  9:15 PM   Result Value Ref Range    Sodium 132 (L) 136 - 145 mmol/L    Potassium 1.9 (LL) 3.5 - 5.5 mmol/L    Chloride 89 (L) 100 - 108 mmol/L    CO2 25 21 - 32 mmol/L    Anion gap 18 3.0 - 18 mmol/L    Glucose 139 (H) 74 - 99 mg/dL    BUN 21 (H) 7.0 - 18 MG/DL    Creatinine 1.17 0.6 - 1.3 MG/DL    BUN/Creatinine ratio 18 12 - 20      GFR est AA 58 (L) >60 ml/min/1.73m2    GFR est non-AA 48 (L) >60 ml/min/1.73m2    Calcium 9.6 8.5 - 10.1 MG/DL    Bilirubin, total 2.6 (H) 0.2 - 1.0 MG/DL    ALT (SGPT) 230 (H) 13 - 56 U/L    AST (SGOT) 103 (H) 15 - 37 U/L Alk. phosphatase 137 (H) 45 - 117 U/L    Protein, total 7.7 6.4 - 8.2 g/dL    Albumin 3.9 3.4 - 5.0 g/dL    Globulin 3.8 2.0 - 4.0 g/dL    A-G Ratio 1.0 0.8 - 1.7     TSH 3RD GENERATION    Collection Time: 04/26/17  9:15 PM   Result Value Ref Range    TSH 1.84 0.36 - 3.74 uIU/mL   D DIMER    Collection Time: 04/26/17  9:15 PM   Result Value Ref Range    D DIMER <0.27 <0.46 ug/ml(FEU)   CARDIAC PANEL,(CK, CKMB & TROPONIN)    Collection Time: 04/26/17  9:15 PM   Result Value Ref Range     (H) 26 - 192 U/L    CK - MB 1.8 <3.6 ng/ml    CK-MB Index 0.8 0.0 - 4.0 %    Troponin-I, Qt. <0.02 0.0 - 5.633 NG/ML   SALICYLATE    Collection Time: 04/26/17  9:15 PM   Result Value Ref Range    SALICYLATE <2.9 (L) 2.8 - 20.0 MG/DL   ETHYL ALCOHOL    Collection Time: 04/26/17  9:15 PM   Result Value Ref Range    ALCOHOL(ETHYL),SERUM <3 0 - 3 MG/DL   ACETAMINOPHEN    Collection Time: 04/26/17  9:30 PM   Result Value Ref Range    ACETAMINOPHEN <2 (L) 10 - 30 ug/mL   POC LACTIC ACID    Collection Time: 04/26/17  9:34 PM   Result Value Ref Range    Lactic Acid (POC) 10.6 (HH) 0.4 - 2.0 mmol/L   URINALYSIS W/ RFLX MICROSCOPIC    Collection Time: 04/26/17  9:40 PM   Result Value Ref Range    Color DARK YELLOW      Appearance CLEAR      Specific gravity >1.030 (H) 1.005 - 1.030    pH (UA) 5.5 5.0 - 8.0      Protein TRACE (A) NEG mg/dL    Glucose 100 (A) NEG mg/dL    Ketone NEGATIVE  NEG mg/dL    Bilirubin MODERATE (A) NEG      Blood TRACE (A) NEG      Urobilinogen 1.0 0.2 - 1.0 EU/dL    Nitrites POSITIVE (A) NEG      Leukocyte Esterase SMALL (A) NEG     DRUG SCREEN, URINE    Collection Time: 04/26/17  9:40 PM   Result Value Ref Range    BENZODIAZEPINE NEGATIVE  NEG      BARBITURATES NEGATIVE  NEG      THC (TH-CANNABINOL) NEGATIVE  NEG      OPIATES NEGATIVE  NEG      PCP(PHENCYCLIDINE) NEGATIVE  NEG      COCAINE NEGATIVE  NEG      AMPHETAMINE NEGATIVE  NEG      METHADONE NEGATIVE       HDSCOM (NOTE)    URINE MICROSCOPIC ONLY Collection Time: 04/26/17  9:40 PM   Result Value Ref Range    WBC 8 to 10 0 - 4 /hpf    RBC 0 0 - 5 /hpf    Epithelial cells FEW 0 - 5 /lpf    Bacteria 1+ (A) NEG /hpf   POC LACTIC ACID    Collection Time: 04/26/17  9:49 PM   Result Value Ref Range    Lactic Acid (POC) 9.1 (HH) 0.4 - 2.0 mmol/L   EKG, 12 LEAD, INITIAL    Collection Time: 04/26/17  9:52 PM   Result Value Ref Range    Ventricular Rate 121 BPM    Atrial Rate 121 BPM    P-R Interval 126 ms    QRS Duration 80 ms    Q-T Interval 424 ms    QTC Calculation (Bezet) 602 ms    Calculated P Axis 42 degrees    Calculated R Axis 56 degrees    Calculated T Axis 90 degrees    Diagnosis       Sinus tachycardia  Possible Left atrial enlargement  Septal infarct , age undetermined  ST & T wave abnormality, consider inferior ischemia  ST & T wave abnormality, consider anterolateral ischemia  Abnormal ECG  When compared with ECG of 02-OCT-2016 02:28,  Septal infarct is now present  Non-specific change in ST segment in Anterior leads  T wave inversion now evident in Inferior leads  T wave inversion now evident in Anterolateral leads     POC LACTIC ACID    Collection Time: 04/27/17  1:12 AM   Result Value Ref Range    Lactic Acid (POC) 2.6 (HH) 0.4 - 2.0 mmol/L       Assessment/Plan     Principal Problem:    Encephalopathy (4/27/2017)    Active Problems:    Dehydration (4/27/2017)      Hypokalemia (4/27/2017)      Lactic acidosis (4/27/2017)      Polycythemia (HCC) (4/27/2017)      Leukocytosis (4/27/2017)      UTI (urinary tract infection) (4/27/2017)      Elevated liver enzymes (4/27/2017)      Hyponatremia   Possible Dysphagia   COPD   RA  Anxiety     PLAN:    Encephalopathy   - metabolic vs psychosis  - replace Potassium and rehydrate patient  - CT head reviewed   - recommend Psychiatry eval inpatient     Dehydration  - IVF     Hypokalemia   - severe K 1.9  - replete   - On tele     Lactic acidosis  - 2/2 hypovolemia  - IVF  - Trending down     Polycythemia   - 2/2 hemoconcentration from hypovolemia   - IVF  - Monitor CBC     Leukocytosis   - reactive vs infection  - Recheck CBC     UTI   - Cefepime pending urine culture result     Hyponatremia   - IVF   - Recheck BMP    Elevated Liver enzymes  - recheck may need further work up    Copd     Possible Dysphagia   - Speech eval     Anxiety   - resume home med when list is reviewed    DVT Prophylaxis     Full code         Samantha Menendez MD  4/27/2017  2:59 AM

## 2017-04-28 ENCOUNTER — APPOINTMENT (OUTPATIENT)
Dept: GENERAL RADIOLOGY | Age: 57
DRG: 049 | End: 2017-04-28
Attending: INTERNAL MEDICINE
Payer: MEDICAID

## 2017-04-28 LAB
ANION GAP BLD CALC-SCNC: 10 MMOL/L (ref 3–18)
BASOPHILS # BLD AUTO: 0 K/UL (ref 0–0.06)
BASOPHILS # BLD: 0 % (ref 0–2)
BUN SERPL-MCNC: 6 MG/DL (ref 7–18)
BUN/CREAT SERPL: 17 (ref 12–20)
CALCIUM SERPL-MCNC: 7.2 MG/DL (ref 8.5–10.1)
CHLORIDE SERPL-SCNC: 104 MMOL/L (ref 100–108)
CO2 SERPL-SCNC: 23 MMOL/L (ref 21–32)
CREAT SERPL-MCNC: 0.35 MG/DL (ref 0.6–1.3)
CRP SERPL-MCNC: 2.1 MG/DL (ref 0–0.3)
DIFFERENTIAL METHOD BLD: ABNORMAL
EOSINOPHIL # BLD: 0.1 K/UL (ref 0–0.4)
EOSINOPHIL NFR BLD: 2 % (ref 0–5)
ERYTHROCYTE [DISTWIDTH] IN BLOOD BY AUTOMATED COUNT: 13.5 % (ref 11.6–14.5)
ERYTHROCYTE [SEDIMENTATION RATE] IN BLOOD: 20 MM/HR (ref 0–30)
GLUCOSE SERPL-MCNC: 105 MG/DL (ref 74–99)
HAV IGM SERPL QL IA: NEGATIVE
HBV CORE IGM SER QL: NEGATIVE
HBV SURFACE AG SER QL: <0.1 INDEX
HBV SURFACE AG SER QL: NEGATIVE
HCT VFR BLD AUTO: 31.3 % (ref 35–45)
HCV AB SER IA-ACNC: 0.03 INDEX
HCV AB SERPL QL IA: NEGATIVE
HCV COMMENT,HCGAC: NORMAL
HGB BLD-MCNC: 11.2 G/DL (ref 12–16)
LYMPHOCYTES # BLD AUTO: 47 % (ref 21–52)
LYMPHOCYTES # BLD: 2.8 K/UL (ref 0.9–3.6)
MCH RBC QN AUTO: 30.3 PG (ref 24–34)
MCHC RBC AUTO-ENTMCNC: 35.8 G/DL (ref 31–37)
MCV RBC AUTO: 84.6 FL (ref 74–97)
MONOCYTES # BLD: 0.4 K/UL (ref 0.05–1.2)
MONOCYTES NFR BLD AUTO: 7 % (ref 3–10)
NEUTS SEG # BLD: 2.7 K/UL (ref 1.8–8)
NEUTS SEG NFR BLD AUTO: 44 % (ref 40–73)
PLATELET # BLD AUTO: 162 K/UL (ref 135–420)
PMV BLD AUTO: 10.1 FL (ref 9.2–11.8)
POTASSIUM SERPL-SCNC: 2.8 MMOL/L (ref 3.5–5.5)
RBC # BLD AUTO: 3.7 M/UL (ref 4.2–5.3)
SODIUM SERPL-SCNC: 137 MMOL/L (ref 136–145)
SP1: NORMAL
SP2: NORMAL
SP3: NORMAL
WBC # BLD AUTO: 6 K/UL (ref 4.6–13.2)

## 2017-04-28 PROCEDURE — 65660000000 HC RM CCU STEPDOWN

## 2017-04-28 PROCEDURE — 86140 C-REACTIVE PROTEIN: CPT | Performed by: INTERNAL MEDICINE

## 2017-04-28 PROCEDURE — 74011250637 HC RX REV CODE- 250/637: Performed by: INTERNAL MEDICINE

## 2017-04-28 PROCEDURE — 74011250636 HC RX REV CODE- 250/636: Performed by: INTERNAL MEDICINE

## 2017-04-28 PROCEDURE — 74011250636 HC RX REV CODE- 250/636: Performed by: EMERGENCY MEDICINE

## 2017-04-28 PROCEDURE — 85652 RBC SED RATE AUTOMATED: CPT | Performed by: INTERNAL MEDICINE

## 2017-04-28 PROCEDURE — 85025 COMPLETE CBC W/AUTO DIFF WBC: CPT | Performed by: INTERNAL MEDICINE

## 2017-04-28 PROCEDURE — 74011250637 HC RX REV CODE- 250/637: Performed by: FAMILY MEDICINE

## 2017-04-28 PROCEDURE — 92611 MOTION FLUOROSCOPY/SWALLOW: CPT

## 2017-04-28 PROCEDURE — 80048 BASIC METABOLIC PNL TOTAL CA: CPT | Performed by: INTERNAL MEDICINE

## 2017-04-28 PROCEDURE — 74011250636 HC RX REV CODE- 250/636: Performed by: FAMILY MEDICINE

## 2017-04-28 PROCEDURE — 74230 X-RAY XM SWLNG FUNCJ C+: CPT

## 2017-04-28 PROCEDURE — 74011000255 HC RX REV CODE- 255: Performed by: INTERNAL MEDICINE

## 2017-04-28 PROCEDURE — 86038 ANTINUCLEAR ANTIBODIES: CPT | Performed by: INTERNAL MEDICINE

## 2017-04-28 PROCEDURE — 74011000258 HC RX REV CODE- 258: Performed by: EMERGENCY MEDICINE

## 2017-04-28 PROCEDURE — 36415 COLL VENOUS BLD VENIPUNCTURE: CPT | Performed by: INTERNAL MEDICINE

## 2017-04-28 RX ORDER — POTASSIUM CHLORIDE 1.5 G/1.77G
40 POWDER, FOR SOLUTION ORAL
Status: COMPLETED | OUTPATIENT
Start: 2017-04-28 | End: 2017-04-28

## 2017-04-28 RX ADMIN — BENZOCAINE AND MENTHOL 1 LOZENGE: 15; 3.6 LOZENGE ORAL at 16:59

## 2017-04-28 RX ADMIN — POTASSIUM CHLORIDE 40 MEQ: 1.5 POWDER, FOR SOLUTION ORAL at 08:07

## 2017-04-28 RX ADMIN — TRAMADOL HYDROCHLORIDE 50 MG: 50 TABLET, FILM COATED ORAL at 01:05

## 2017-04-28 RX ADMIN — BARIUM SULFATE 30 ML: 0.81 POWDER, FOR SUSPENSION ORAL at 10:00

## 2017-04-28 RX ADMIN — TRAMADOL HYDROCHLORIDE 50 MG: 50 TABLET, FILM COATED ORAL at 22:12

## 2017-04-28 RX ADMIN — ENOXAPARIN SODIUM 40 MG: 40 INJECTION SUBCUTANEOUS at 05:53

## 2017-04-28 RX ADMIN — LORAZEPAM 1 MG: 1 TABLET ORAL at 05:53

## 2017-04-28 RX ADMIN — CEFEPIME 2 G: 2 INJECTION, POWDER, FOR SOLUTION INTRAVENOUS at 05:53

## 2017-04-28 RX ADMIN — BARIUM SULFATE 15 ML: 400 PASTE ORAL at 10:00

## 2017-04-28 RX ADMIN — DEXTROSE MONOHYDRATE, SODIUM CHLORIDE, AND POTASSIUM CHLORIDE: 50; 4.5; 2.98 INJECTION, SOLUTION INTRAVENOUS at 08:06

## 2017-04-28 RX ADMIN — TRAMADOL HYDROCHLORIDE 50 MG: 50 TABLET, FILM COATED ORAL at 16:49

## 2017-04-28 RX ADMIN — DEXTROSE MONOHYDRATE, SODIUM CHLORIDE, AND POTASSIUM CHLORIDE: 50; 4.5; 2.98 INJECTION, SOLUTION INTRAVENOUS at 17:50

## 2017-04-28 NOTE — ROUTINE PROCESS
6798 Patient off the floor to Radiology for barium swallow. 1030 Patient back in her room. 1230 MRI checklist done. Significant other was in the room. IDR done. 1540 Patient sleeping. Called MRI for time - for MRI late this evening. 1750 Noted slight bleeding from nose. Patient advised not to pick her nose. Refused nasal spray, offered many times. 1920 Bedside and Verbal shift change report given to Kaushal Agudelo RN (oncoming nurse) by Galina Brewer RN (offgoing nurse). Report included the following information SBAR, Kardex, Intake/Output, MAR, Recent Results and Cardiac Rhythm NSR.

## 2017-04-28 NOTE — PROGRESS NOTES
Chart reviewed. Plan remains home with her boyfriend when medically stable. Will cont to follow for needs. Janette Villeda. Ext. [phone].

## 2017-04-28 NOTE — PROGRESS NOTES
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    Pain in joint, multiple sites M25.50    Cervicalgia M54.2    Chronic low back pain M54.5, G89.29    Migraine without aura G43.009    Myalgia and myositis [id number]    Encounter for long-term (current) use of high-risk medication Z79.899    Enthesopathy of hip region M76.899    Tobacco abuse Z72.0    Chronic headache disorder R51    Drug-induced constipation K59.03    Muscle spasms of neck M62.838    RLS (restless legs syndrome) G25.81    Depression F32.9    Dehydration E86.0    Hypokalemia E87.6    Lactic acidosis E87.2    Polycythemia (HCC) D75.1    Leukocytosis D72.829    UTI (urinary tract infection) N39.0    Encephalopathy G93.40    Elevated liver enzymes R74.8       Assesment         Acute Encephalopathy vs psychosis  - replace Potassium and rehydrate patient, Tx UTI  - CT head wnl  - Psych eval today as lytes near normal and infection treated for 30hrs.     Dehydration with AICHA  Cr 1.17 to 0.5 -> 0.3 with IVF      Severe Hypokalemia   - K 1.9 ->2.8 ->40mEQ ER, now 40meQ in drip q8h add 40meq KCL-resolving     Lactic acidosis  - 2/2 hypovolemia-resolved     Polycythemia   - 2/2 hemoconcentration from dehydration--resolved    Leukocytosis   - reactive vs infection, monitor -resolving     UTI   - change to rocephin. UA suggesting mild UTI. No Cx data. Tx 5d total     Hyponatremia   -resolved. Does not fit with dehydration, possible malnutrition/poor po intake     Transamnitis  - UDS neg, Hepatitis panel pending. Etiology uncertain, possibly fatty liver.     Copd   -prn duonebs     Possible Dysphagia   - Speech eval      Depression disorder with Anxiety.  Hx of prior psychiatric stay, suicide attempt x1  - resume seroquel 100, celexa 20 today, po ativan  (klonopin at home)  - psych admission 10/2-10/17 2016 for suicide attempt    DEBO  Rx for claritan on discharge. DVT Protocol Active: yes  Code Status:  Full Code     Disposition: Unk. Req 1 hospital day    Subjective:     CC: Altered mental status (decreased PO, incontinent, weakness)    Interval History: Psychosis improving but still present with odd complaints of dry mouth finger swelling,and inability to move arms that she is clearly moving. MRI and ESR/CRP/CLAUDIA added. Denies SI.     Objective:     Visit Vitals    /83 (BP 1 Location: Left arm, BP Patient Position: At rest)    Pulse 89    Temp 97.2 °F (36.2 °C)    Resp 19    Ht 5' 4\" (1.626 m)    Wt 68.6 kg (151 lb 3.2 oz)    SpO2 97%    BMI 25.95 kg/m2       Temp (24hrs), Av.5 °F (36.4 °C), Min:97.2 °F (36.2 °C), Max:98 °F (36.7 °C)        Intake/Output Summary (Last 24 hours) at 17 0735  Last data filed at 17 0611   Gross per 24 hour   Intake          1358.75 ml   Output             1450 ml   Net           -91.25 ml       Gen: NAD does have orientation  HEENT:  FRANK CHRIS. Neck: No Bruits/JVD   Lungs:   CTAB. Good respiratory effort  Heart:   RR S1 S2 without M/R/G  Abdomen: ND,NT, BSX4,   Extremities:   No LE edema. No cyanosis. Skin:  no jaundice/lesions      Data Review:     Meds/Labs/Tests reviewed    Current Shift:     Last three shifts:   1901 -  0700  In: 1358.8 [P.O.:240;  I.V.:1118.8]  Out: 2450 [Urine:2450]  Recent Labs      17   0430  17   0545  17   WBC  6.0  8.1  13.4*   RBC  3.70*  3.99*  5.45*   HGB  11.2*  12.2  17.0*   HCT  31.3*  34.0*  45.8*   PLT  162  181  277   GRANS  44  66  58   LYMPH  47  27  34   EOS  2  0  0       Recent Labs      17   0430  17   0545  17   2115   BUN  6*  10  13  21*   CREA  0.35*  0.51*  0.55*  1.17   CA  7.2*  7.3*  6.9*  9.6   ALB   --    --   2.7*  3.9   K  2.8*  2.8*  2.4*  1.9*   NA  137  136  138  132*   CL  104  104 103  89*   CO2  23  22  23  25   GLU  105*  118*  83  139*        Lab Results   Component Value Date/Time    Glucose 105 04/28/2017 04:30 AM    Glucose 118 04/27/2017 08:15 PM    Glucose 83 04/27/2017 05:45 AM    Glucose 139 04/26/2017 09:15 PM    Glucose 88 10/04/2016 03:28 PM          Care coordination with Nursing/Consultants/staff: 5  Prior history, labs, and charting reviewed: 15    Procedures/Imaging:  Ct head 4/26  Ct abd 4/27  MRI head 4/28    Total time spent with chart review, patient examination/education, discussion with staff on case,documentation and medication management / adjustment  :  30 Minutes      Dr Burgess Public  Pager: [phone]

## 2017-04-28 NOTE — PROGRESS NOTES
1345: Re-attempted PT eval. Patient in bed with eyes closed. Eyes open to voice. Continues to refuse PT; eyes closed 75% of interaction. Will re-attempt as schedule permits. 1055: Chart reviewed. Patient cleared with RN Franki Osullivan as appropriate for PT. Patient has recently returned to floor from swallow study. Patient refused treatment secondary to \"too tired and weak\". Educated on role of PT and effects of immobility. Will re-attempt for PT eval as schedule permits.      Thank you,     Ovidio Kennedy, PT, DPT

## 2017-04-28 NOTE — PROGRESS NOTES
Problem: Dysphagia (Adult)  Goal: *Acute Goals and Plan of Care (Insert Text)    Recommendations:  Diet: mech-soft/ground with thin liquids (NO STRAWS)  Meds: one at a time  Aspiration Precautions  Oral Care TID    Goals: Patient will:  1. Tolerate PO trials with 0 s/s overt distress in 4/5 trials  2. Utilize compensatory swallow strategies/maneuvers (decrease bite/sip, size/rate, alt. liq/sol) with min cues in 4/5 trials  3. Complete an objective swallow study (i.e., MBSS) to assess swallow integrity, r/o aspiration, and determine of safest LRD, min A -- goal met    [address] STUDY     Patient: Teddy Boeck (53 y.o. female)  Date: 4/28/2017  Primary Diagnosis: Hypokalemia  UTI (urinary tract infection)  Dehydration  Encephalopathy  Polycythemia (HCC)  Elevated liver enzymes  Lactic acidosis  Leukocytosis        Precautions: aspiration         ASSESSMENT :  MBS completed with x 1 instance of aspiration on thin liquid + straw when challenged with consecutive swallows; aspiration occurred on last of 6 trials secondary fatigue. No aspiration noted with single cup sips. Pudding accepted with labored oral prep, premature spillage into valleculae, and swallow delay. No aspiration with pudding. Cracker accepted with significantly impaired oral bolus prep with premature spillage into valleculae and pyriforms. Pt required liquid wash to initiate swallow; no aspiration evident. Scant vallecular residue pt able to clear with cues for multiple swallows. Pt presents with moderately-severe oropharyngeal dysphagia, as evidenced above, which places pt at risk for aspiration. At this time, safest for mech-soft, ground meats with thin liquids. Pt MUST be fed alternating solids/liquids; no straws. Meds recommended whole in applesauce/pudding. SLP utilized video of study for visual feedback, education, and recommendations for pt; pt without comprehension.        Patient will benefit from skilled intervention to address the above impairments. Patients rehabilitation potential is considered to be Fair  Factors which may influence rehabilitation potential include:   [ ]              None noted  [X]              Mental ability/status  [X]              Medical condition  [X]              Home/family situation and support systems  [X]              Safety awareness  [X]              Pain tolerance/management  [ ]              Other:        PLAN :  Recommendations and Planned Interventions:  mech-soft, ground/thin liquids NO STRAWS  Frequency/Duration: Patient will be followed by speech-language pathology 1-2 times per day/4-7 days per week to address goals. Discharge Recommendations: Skilled Nursing Facility       SUBJECTIVE:   Patient stated Maritza Bui somewhat tell me what is coating my mouth? .      OBJECTIVE:       Past Medical History:   Diagnosis Date    Allergic rhinitis      Angina pectoris (HCC)      Anxiety      Bronchitis      Chronic fatigue syndrome      Chronic pain      [facility]      Generalized pain      Headache, tension-type      HTN (hypertension)      Hyperlipidemia      Hypokalemia      Insomnia      Leg swelling      Lyme disease      Nicotine dependence      OA (osteoarthritis)      RA (rheumatoid arthritis) ([facility])      Vertigo      Vitamin D deficiency       Past Surgical History:   Procedure Laterality Date    HX BUNIONECTOMY   1991     left foot     Prior Level of Function/Home Situation: lives with boyfriend  Home Situation  Home Environment: Private residence  One/Two Story Residence: One story  Living Alone: No  Support Systems: Other (comments) (SSI)  Patient Expects to be Discharged to[de-identified] Private residence  Current DME Used/Available at Home: None  Diet prior to admission: regular  Current Diet:  mech-soft, ground/thin liquids NO STRAWS   Radiologist: HRRA  Film Views: Lateral  Patient Position: 90      Trial 1: Trial 2: Consistency Presented: Thin liquid Consistency Presented: Pudding   How Presented: Self-fed/presented;SLP-fed/presented;Cup/sip;Straw;Successive swallows How Presented: SLP-fed/presented;Spoon         Bolus Acceptance: No impairment Bolus Acceptance: No impairment   Bolus Formation/Control: No impairment:   Bolus Formation/Control: Impaired: Delayed;Mastication;Premature spillage   Propulsion: No impairment Propulsion: Delayed (# of seconds)   Oral Residue: Palatal Oral Residue: Palatal   Initiation of Swallow: Triggered at vallecula;Triggered at pyriform sinus(es) Initiation of Swallow: Triggered at valleculae;Triggered at pyriform sinus(es)   Timing: Pooling 1-5 sec Timing: Pooling 1-5 sec   Penetration: None Penetration: None   Aspiration/Timing: During Aspiration/Timing: No evidence of aspiration   Pharyngeal Clearance: Vallecular residue Pharyngeal Clearance: No residue   Attempted Modifications: Cup/sip;Small sips and bites Attempted Modifications: Small sips and bites; Double swallow   Effective Modifications: Cup/sip;Small sips and bites Effective Modifications: Double swallow   Cues for Modifications: Moderate Cues for Modifications: Moderate             Trial 3: Trial 4:   Consistency Presented: Mechanical soft     How Presented: SLP-fed/presented     Consistency Amount: 1     Bolus Acceptance: No impairment      : Delayed;Mastication;Premature spillage;Piecemeal  :     Propulsion: Delayed (# of seconds)     Oral Residue: Lingual;Palatal     Initiation of Swallow: Triggered at valleculae;Triggered at pyriform sinus(es)     Timing: Pooling 6-10 sec     Penetration: None     Aspiration/Timing: No evidence of aspiration     Pharyngeal Clearance: No residue     Attempted Modifications: Other (comment) (liquid wash)     Effective Modifications: Other (comment) (liquid wash)     Cues for Modifications:  Moderate-Max              Decreased Tongue Base Retraction?: Yes  Laryngeal Elevation: Reduced excursion with laryngeal vestibule gap  Aspiration/Penetration Score: 7 (Aspiration-Contrast passes below the cords/, but is not ejected despite attempt)  Pharyngeal Symmetry: Not assessed  Pharyngeal-Esophageal Segment: No impairment  Pharyngeal Dysfunction: Decreased tongue base retraction;Decreased strength;Decreased elevation/closure     Oral Phase Severity: Moderate  Pharyngeal Phase Severity: Mild     PAIN:  Start of Study: 0  End of Study: 0       COMMUNICATION/EDUCATION:   [X]  Aspiration risks/precautions; compensatory swallow techniques  [X]  Patient/family have participated as able in goal setting and plan of care. [ ]  Patient/family agree to work toward stated goals and plan of care. [ ]  Patient understands intent and goals of therapy, but is neutral about his/her participation. [X]  Patient is unable to participate in goal setting and plan of care; education ongoing with interdisciplinary staff.      Thank you for this referral.  ROMY Mcbride  Time Calculation: 30 mins

## 2017-04-28 NOTE — CONSULTS
History of Present Illness: Pt is an 59 yo female with an unclear psychiatric diagnosis. Pt seen, chart reviewed and appreciated. Per records, pt has a prior hx of inpt psychiatric admission. Recently as September 2016. She was prescribed as an outpt Seroquel, Remeron, Klonopin and Celexa. Per staff, the pts boyfriend, Paula Henderson indicated that pt had stopped talking all of her medications and has not been doing well. She presented to the hospital on 1/83, BIB police called by her boyfriend due to altered mental state. She reportedly refused medical treatment and had to be escorted in by police. She was noted to be disoriented, confused, decreased mentation, incontinent of urine, disheveled with a foul body odor. Pt reportedly had not been out of bed in the past approximate 2 weeks. In the ED she reported she had not eaten for days nor taken her medication because she felt she could not swallow and that there was something in her mouth blocking her ability to eat and swallow. She also noted to ED staff that there was something floating in the air in front of her. She was admitted with diagnoses of Altered mental status, Acute hypokalemia (K of 1.9), lactic acidosis likely secondary to dehydration. Also ?dysphagia. Upon evaluation via televideo, the pt presents with decreased mentation and sickly appearing. She reports she does not feel well physically. States for the past two weeks she noticed progressive weakness in her arms and legs. States it got to point where she states she could not walk. States she was then unable to get out of bed. States she then developed weakness in her ability to chew then swallow. States as a result she has not been taking any of her medical medications. Writer asked about psychiatric medications and she reports she has not been on anything for months.   Admits to the psychiatric admission last September 2016 however states she does not recall why she was admitted. States this is not psychiatric. something is very wrong with mephysically.   I need medical help not a psychiatry.   When asked why, as per chart review, the pt refused medical treatment and was escorted by police. Pt states she did refuse as she did not think anyone could help her. When asked if she wanted help, she indicated yes however states she still feels very unwell and states feels more weak and fatigued since her admission. Over the past 1-2 months denies overt symptoms of depression, jelena nor psychosis. Reports she does not feel depressed currently but anxious about what is happening to her. She denies AVHs currently nor a history of such. When asked why she was on Seroquel, the pt reported she did not know. Instead repeated multiple times  that she needed medical help not psychiatric. She currently denies seeing anything floating in front of her face nor the sensation of anything in her mouth. States every time I try to swallow it doesnt go down and comes back up.  On ROS, she currently denies AVHs, ?delusion. Denies SI/HI. Inpt - prior inpt admission September 2016 unclear reason  Outpt  denies currently   SAttempts   denies   Medical History: see chart  Substance Use Hx: unknown  Medications & Freq: see chart  Allergies:ASA, ibuprofen, Levaquin, Mobic, PCN, Plaquenil  Mental Status Exam:   Appearance and attire: Dressed in hospital attire, sickly appearing, disheveled  Attitude and behavior: guarded  Affect and mood: worried/anxiety  Association and thought processes: linear  Thought content: ?delusional thinking   Perceptual: Denies AVHs   Sensorium, memory, and orientation She was oriented to self, place and approximate date  Friday, April 2017  Insight and judgment: poor/Impaired  Impression:  He altered mental state is likely multifactorial due to dehydration, electrolyte derangements, UTI, abrupt cessation of medications (medical and psychiatric).   The cause of her inability to eat and take medications  whether intentional or unintentional  cannot be determined at this time and will need re-evaluation when she is more alert and clear. Pt is not yet medically cleared. She appears sick, altered, with continued sedation. She talked to this writer however most questions had to be repeated multiple times. Poor eye contact as she was nodding off periodically during the evaluation. She was oriented to self, place and approximate date  Friday, April 2017  Treatment Recommendations:  Please continue to evaluate and treat underlying medical contributors to her altered state. She does not appear to be hallucinating at this time. However her rationale re: her inability to eat, drink nor take medications may be delusional in nature. However due to altered sedated state, that cannot be determined at this time. Recc re-eval with psychiatry once medically cleared or more clear mentation  so she is able to fully participate in the evaluation. Avoid sedating medications  Writer was unable to reach the pts boyfriend, Jorge Franks at that numbers listed in the chart and pt not able to provide numbers due to her altered state. Recc obtaining collateral from the pts boyfriend, specifically regarding:  Course of symptoms/decline  When medications were stopped  Her baseline functioning  If known, her prior psychiatric history and diagnosis. Avoid sedating medications  Given her report of heightened anxiety, could restart Celexa 20mg Daily. Would hold other psychiatric medications until a full assessment can be obtained and collateral re: outpt treatment and medications hx can be obtained.   Dysphagia eval?  Swallow eval?

## 2017-04-29 ENCOUNTER — APPOINTMENT (OUTPATIENT)
Dept: MRI IMAGING | Age: 57
DRG: 049 | End: 2017-04-29
Attending: INTERNAL MEDICINE
Payer: MEDICAID

## 2017-04-29 LAB
ANA SER QL: NEGATIVE
ANION GAP BLD CALC-SCNC: 9 MMOL/L (ref 3–18)
BACTERIA SPEC CULT: NORMAL
BASOPHILS # BLD AUTO: 0 K/UL (ref 0–0.1)
BASOPHILS # BLD: 0 % (ref 0–3)
BUN SERPL-MCNC: 2 MG/DL (ref 7–18)
BUN/CREAT SERPL: 6 (ref 12–20)
CALCIUM SERPL-MCNC: 7.5 MG/DL (ref 8.5–10.1)
CHLORIDE SERPL-SCNC: 103 MMOL/L (ref 100–108)
CO2 SERPL-SCNC: 23 MMOL/L (ref 21–32)
CREAT SERPL-MCNC: 0.31 MG/DL (ref 0.6–1.3)
DIFFERENTIAL METHOD BLD: ABNORMAL
EOSINOPHIL # BLD: 0.2 K/UL (ref 0–0.4)
EOSINOPHIL NFR BLD: 3 % (ref 0–5)
ERYTHROCYTE [DISTWIDTH] IN BLOOD BY AUTOMATED COUNT: 13.6 % (ref 11.6–14.5)
GLUCOSE SERPL-MCNC: 93 MG/DL (ref 74–99)
HCT VFR BLD AUTO: 33.8 % (ref 35–45)
HGB BLD-MCNC: 12 G/DL (ref 12–16)
LYMPHOCYTES # BLD AUTO: 47 % (ref 20–51)
LYMPHOCYTES # BLD: 2.7 K/UL (ref 0.8–3.5)
MCH RBC QN AUTO: 30.7 PG (ref 24–34)
MCHC RBC AUTO-ENTMCNC: 35.5 G/DL (ref 31–37)
MCV RBC AUTO: 86.4 FL (ref 74–97)
MONOCYTES # BLD: 0.3 K/UL (ref 0–1)
MONOCYTES NFR BLD AUTO: 6 % (ref 2–9)
NEUTS SEG # BLD: 2.5 K/UL (ref 1.8–8)
NEUTS SEG NFR BLD AUTO: 44 % (ref 42–75)
PLATELET # BLD AUTO: 163 K/UL (ref 135–420)
PMV BLD AUTO: 10.6 FL (ref 9.2–11.8)
POTASSIUM SERPL-SCNC: 3.7 MMOL/L (ref 3.5–5.5)
RBC # BLD AUTO: 3.91 M/UL (ref 4.2–5.3)
RBC MORPH BLD: ABNORMAL
SEE BELOW:, 164879: NORMAL
SERVICE CMNT-IMP: NORMAL
SODIUM SERPL-SCNC: 135 MMOL/L (ref 136–145)
WBC # BLD AUTO: 5.7 K/UL (ref 4.6–13.2)

## 2017-04-29 PROCEDURE — 65660000000 HC RM CCU STEPDOWN

## 2017-04-29 PROCEDURE — 74011250636 HC RX REV CODE- 250/636: Performed by: INTERNAL MEDICINE

## 2017-04-29 PROCEDURE — 74011250637 HC RX REV CODE- 250/637: Performed by: INTERNAL MEDICINE

## 2017-04-29 PROCEDURE — 74011250637 HC RX REV CODE- 250/637: Performed by: FAMILY MEDICINE

## 2017-04-29 PROCEDURE — 85025 COMPLETE CBC W/AUTO DIFF WBC: CPT | Performed by: INTERNAL MEDICINE

## 2017-04-29 PROCEDURE — 74011000258 HC RX REV CODE- 258: Performed by: INTERNAL MEDICINE

## 2017-04-29 PROCEDURE — 70551 MRI BRAIN STEM W/O DYE: CPT

## 2017-04-29 PROCEDURE — 80048 BASIC METABOLIC PNL TOTAL CA: CPT | Performed by: INTERNAL MEDICINE

## 2017-04-29 PROCEDURE — 97161 PT EVAL LOW COMPLEX 20 MIN: CPT

## 2017-04-29 PROCEDURE — 74011250636 HC RX REV CODE- 250/636: Performed by: FAMILY MEDICINE

## 2017-04-29 PROCEDURE — 97166 OT EVAL MOD COMPLEX 45 MIN: CPT

## 2017-04-29 PROCEDURE — 97162 PT EVAL MOD COMPLEX 30 MIN: CPT

## 2017-04-29 PROCEDURE — 36415 COLL VENOUS BLD VENIPUNCTURE: CPT | Performed by: INTERNAL MEDICINE

## 2017-04-29 RX ORDER — CITALOPRAM 20 MG/1
20 TABLET, FILM COATED ORAL DAILY
Status: DISCONTINUED | OUTPATIENT
Start: 2017-04-29 | End: 2017-05-19 | Stop reason: HOSPADM

## 2017-04-29 RX ADMIN — TRAMADOL HYDROCHLORIDE 50 MG: 50 TABLET, FILM COATED ORAL at 13:02

## 2017-04-29 RX ADMIN — CEFTRIAXONE SODIUM 1 G: 1 INJECTION, POWDER, FOR SOLUTION INTRAMUSCULAR; INTRAVENOUS at 01:00

## 2017-04-29 RX ADMIN — DEXTROSE MONOHYDRATE, SODIUM CHLORIDE, AND POTASSIUM CHLORIDE: 50; 4.5; 2.98 INJECTION, SOLUTION INTRAVENOUS at 02:25

## 2017-04-29 RX ADMIN — ENOXAPARIN SODIUM 40 MG: 40 INJECTION SUBCUTANEOUS at 05:56

## 2017-04-29 RX ADMIN — TRAMADOL HYDROCHLORIDE 50 MG: 50 TABLET, FILM COATED ORAL at 05:55

## 2017-04-29 RX ADMIN — CITALOPRAM HYDROBROMIDE 20 MG: 20 TABLET ORAL at 13:02

## 2017-04-29 NOTE — PROGRESS NOTES
Problem: Mobility Impaired (Adult and Pediatric)  Goal: *Acute Goals and Plan of Care (Insert Text)  STG for 4.29.17 to be completed by 5.6.17 (7 days). 1. Pt will complete supine to/from sit with CGA in order to complete EOB activity. 2. Pt will complete sit to/from stand transfer with LRAD and mod A in prep for ambulation. 3. Pt will ambulate 5 feet with LRAD and min A in order to negotiate household distances. Outcome: Progressing Towards Goal  PHYSICAL THERAPY EVALUATION     Patient: Jaleel Chiang (30 y.o. female)  Date: 4/29/2017  Primary Diagnosis: Hypokalemia  UTI (urinary tract infection)  Dehydration  Encephalopathy  Polycythemia (HCC)  Elevated liver enzymes  Lactic acidosis  Leukocytosis  Precautions: Fall         ASSESSMENT : 59 yo female presents with impaired functional mobility, decreased activity tolerance, and generalized weakness this admission. Co-treat with OT to ensure maximal pt safety during all mobility. Pt educated on role of therapy during acute stay, verbalized understanding with questionable carryover. Pt self-limiting this session, reported she cannot do much due to being \"too weak\" and \"hurting all over. \" Pt completed supine to/from sit with mod A x 2, required encouragement. Pt sat EOB x 2 minutes with choreic-like jerking movements EOB. Pt additionally demo impaired coordination and eccentric control with use of BUE when reaching for suction. Pt requesting to return supine after 2 mins due to pain. Recommend continued therapy during acute stay.      Eval Complexity: History: MEDIUM  Complexity : 1-2 comorbidities / personal factors will impact the outcome/ POC Exam:LOW Complexity : 1-2 Standardized tests and measures addressing body structure, function, activity limitation and / or participation in recreation  Presentation: MEDIUM Complexity : Evolving with changing characteristics  Clinical Decision Making:Low Complexity pt self-limiting, reports she was (I) with PLOF, minimally confused today Overall Complexity:MEDIUM        Patient will benefit from skilled intervention to address the above impairments. Patients rehabilitation potential is considered to be Guarded  Factors which may influence rehabilitation potential include:   [ ]         None noted  [ ]         Mental ability/status  [X]         Medical condition  [X]         Home/family situation and support systems  [X]         Safety awareness  [X]         Pain tolerance/management  [ ]         Other:        PLAN :Recommend continued therapy during acute stay. Recommendations and Planned Interventions:  [X]           Bed Mobility Training             [X]    Neuromuscular Re-Education  [X]           Transfer Training                   [ ]    Orthotic/Prosthetic Training  [X]           Gait Training                          [ ]    Modalities  [X]           Therapeutic Exercises          [ ]    Edema Management/Control  [X]           Therapeutic Activities            [X]    Patient and Family Training/Education  [ ]           Other (comment):     Frequency/Duration: Patient will be followed by physical therapy 3-5 times a week to address goals. Discharge Recommendations: Alireza De Leon  Further Equipment Recommendations for Discharge: To be determined at next level of care prior to home. SUBJECTIVE:   Patient stated I hurt all over.       OBJECTIVE DATA SUMMARY:       Past Medical History:   Diagnosis Date    Allergic rhinitis      Angina pectoris ([facility])      Anxiety      Bronchitis      Chronic fatigue syndrome      Chronic pain      COPD (chronic obstructive pulmonary disease) ([facility])      Generalized pain      Headache, tension-type      HTN (hypertension)      Hyperlipidemia      Hypokalemia      Insomnia      Leg swelling      Lyme disease      Nicotine dependence      OA (osteoarthritis)      RA (rheumatoid arthritis) ([facility])      Vertigo      Vitamin D deficiency       Past Surgical History:   Procedure Laterality Date    HX BUNIONECTOMY   1991     left foot     Barriers to Learning/Limitations: no  Compensate with: visual, verbal, tactile, kinesthetic cues/model  GCODES(GP):Mobility [id number] Current  CL= 60-79%   Goal  CJ= 20-39%. The severity rating is based on the [facility] Sitting Balance Scale  0: Pt performs 25% or less of sitting activity (Max assist) CN, 100% impaired. 1: Pt supports self with upper extremities but requires therapist assistance. Pt performs 25-50% of effort (Mod assist) CM, 80% to <100% impaired. 1+: Pt supports self with upper extremities but requires therapist assistance. Pt performs >50% effort. (Min assist). CL, 60% to <80% impaired. 2: Pt supports self independently with both upper extremities. CL, 60% to <80% impaired. 2+: Pt support self independently with 1 upper extremity. CK, 40% to <60% impaired. 3: Pt sits without upper extremity support for up to 30 seconds. CK, 40% to <60% impaired. 3+: Pt sits without upper extremity support for 30 seconds or greater. CJ, 20% to <40% impaired. 4: Pt moves and returns trunkal midpoint 1-2 inches in one plane. CJ, 20% to <40% impaired. 4+: Pt moves and returns trunkal midpoint 1-2 inches in multiple planes. CI, 1% to <20% impaired. 5: Pt moves and returns trunkal midpoint in all planes greater than 2 inches. CH, 0% impaired. Prior Level of Function/Home Situation: Pt was (I) with all amb and mobility PTA.    Home Situation  Home Environment: Private residence  # Steps to Enter: 0  Rails to Enter: No  One/Two Story Residence: One story  Living Alone: No  Support Systems: Spouse/Significant Other/Partner  Patient Expects to be Discharged to[de-identified] Private residence  Current DME Used/Available at Home: None  Critical Behavior:  Neurologic State: Alert  Orientation Level: Oriented X4  Cognition: Follows commands  Psychosocial  Patient Behaviors: Anxious  Purposeful Interaction: Yes  Pt Identified Daily Priority: Clinical issues (comment)  Keith Process: Teaching/learning;Establish trust  Caring Interventions: Reassure; Therapeutic modalities  Reassure: Therapeutic listening; Informing; Acceptance  Therapeutic Modalities: Deep breathing; Intentional therapeutic touch  Skin Condition/Temp: Dry  Skin Integrity: Intact  Skin Integumentary  Skin Color: Appropriate for ethnicity  Skin Condition/Temp: Dry  Skin Integrity: Intact  Turgor: Non-tenting  Hair Growth: Present  Varicosities: Absent  Strength:    Strength: Generally decreased, functional  Tone & Sensation:   Tone: Normal  Range Of Motion:  AROM: Generally decreased, functional (BLE 3-/5 grossly)  PROM: Within functional limits  Functional Mobility:  Bed Mobility:  Supine to Sit: Moderate assistance;Assist x2  Sit to Supine: Moderate assistance;Assist x2  Balance:   Sitting: Impaired  Sitting - Static: Fair (occasional)  Sitting - Dynamic: Fair (occasional)  Ambulation/Gait Training:  Gait Description (WDL):  (did not assess)  Pain:  Pre-treatment level: \"20\"  Location: \"all over\"  Post-treatment level: did not rate  Activity Tolerance:   Poor, pt limited due to complaints of pain  Please refer to the flowsheet for vital signs taken during this treatment. After treatment:   [ ]         Patient left in no apparent distress sitting up in chair  [X]         Patient left in no apparent distress in bed  [X]         Call bell left within reach  [X]         Nursing notified  [X]         Sitter present  [ ]         Bed alarm activated      COMMUNICATION/EDUCATION:   [X]         Fall prevention education was provided and the patient/caregiver indicated understanding. [X]         Patient/family have participated as able in goal setting and plan of care. [X]         Patient/family agree to work toward stated goals and plan of care. [ ]         Patient understands intent and goals of therapy, but is neutral about his/her participation.   [ ]         Patient is unable to participate in goal setting and plan of care.      Thank you for this referral.  Regino Morgan PT, DPT   Time Calculation: 13 mins

## 2017-04-29 NOTE — PROGRESS NOTES
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    Pain in joint, multiple sites M25.50    Cervicalgia M54.2    Chronic low back pain M54.5, G89.29    Migraine without aura G43.009    Myalgia and myositis [id number]    Encounter for long-term (current) use of high-risk medication Z79.899    Enthesopathy of hip region M76.899    Tobacco abuse Z72.0    Chronic headache disorder R51    Drug-induced constipation K59.03    Muscle spasms of neck M62.838    RLS (restless legs syndrome) G25.81    Depression F32.9    Dehydration E86.0    Hypokalemia E87.6    Lactic acidosis E87.2    Polycythemia (HCC) D75.1    Leukocytosis D72.829    UTI (urinary tract infection) N39.0    Encephalopathy G93.40    Elevated liver enzymes R74.8       Assesment         Acute Encephalopathy vs psychosis  - replace Potassium and rehydrate patient, Tx UTI  - CT head wnl  - Psych eval today as lytes near normal and infection treated for 30hrs.     Dehydration with AICHA  Cr 1.17 to 0.5 -> 0.3 with IVF      Severe Hypokalemia   - K 1.9 ->2.8 ->40mEQ ER, now 40meQ in drip q8h add 40meq KCL-resolving     Lactic acidosis  - 2/2 hypovolemia-resolved     Polycythemia   - 2/2 hemoconcentration from dehydration--resolved    Leukocytosis   - reactive vs infection, monitor -resolving     UTI   - change to rocephin. UA suggesting mild UTI. No Cx data. Tx 5d total     Hyponatremia   -resolved. Does not fit with dehydration, possible malnutrition/poor po intake     Transamnitis  - UDS neg, Hepatitis panel pending. Etiology uncertain, possibly fatty liver.     Copd   -prn duonebs     Possible Dysphagia   - Speech eval      Depression disorder with Anxiety.  Hx of prior psychiatric stay, suicide attempt x1  - resume seroquel 100, celexa 20 today, po ativan  (klonopin at home)  - psych admission 10/2-10/17 2016 for suicide attempt    DEBO  Rx for claritan on discharge. DVT Protocol Active: yes  Code Status:  Full Code     Disposition: Unk. Req 1 hospital day    Subjective:     CC: Altered mental status (decreased PO, incontinent, weakness)    Interval History: MRI this am pending. Patient is delusional about her saliva, and gets tachypnic when anyone comes near her. Starting celexa. CRP low. Plan repeat psych consult tomorrow when MRI result back. K normal    Objective:     Visit Vitals    /73 (BP 1 Location: Left arm, BP Patient Position: At rest)    Pulse (!) 112    Temp 98 °F (36.7 °C)    Resp 18    Ht 5' 4\" (1.626 m)    Wt 68.6 kg (151 lb 3.2 oz)    SpO2 98%    BMI 25.95 kg/m2       Temp (24hrs), Av.1 °F (36.7 °C), Min:97.7 °F (36.5 °C), Max:98.3 °F (36.8 °C)        Intake/Output Summary (Last 24 hours) at 17 1323  Last data filed at 17 0700   Gross per 24 hour   Intake          3249.99 ml   Output             2450 ml   Net           799.99 ml       Gen: NAD does have orientation AOx5  HEENT:  FRANK CHRIS. Neck: No Bruits/JVD   Lungs:   CTAB. Good respiratory effort  Heart:   RR S1 S2 without M/R/G  Abdomen: ND,NT, BSX4,   Extremities:   No LE edema. No cyanosis. Skin:  no jaundice/lesions      Data Review:     Meds/Labs/Tests reviewed    Current Shift:     Last three shifts:   1901 -  0700  In: 5650 [P.O.:1100;  I.V.:4550]  Out: 4450 [Urine:4450]  Recent Labs      17   0407  17   0430  17   0545   WBC  5.7  6.0  8.1   RBC  3.91*  3.70*  3.99*   HGB  12.0  11.2*  12.2   HCT  33.8*  31.3*  34.0*   PLT  163  162  181   GRANS  44  44  66   LYMPH  47  47  27   EOS  3  2  0       Recent Labs      17   0407  17   0430  17   0545  17   2115   BUN  2*  6*  10  13  21*   CREA  0.31*  0.35*  0.51*  0.55*  1.17   CA  7.5*  7.2*  7.3*  6.9*  9.6   ALB   --    --    --   2.7*  3.9   K 3.7  2.8*  2.8*  2.4*  1.9*   NA  135*  137  136  138  132*   CL  103  104  104  103  89*   CO2  23  23  22  23  25   GLU  93  105*  118*  83  139*        Lab Results   Component Value Date/Time    Glucose 93 04/29/2017 04:07 AM    Glucose 105 04/28/2017 04:30 AM    Glucose 118 04/27/2017 08:15 PM    Glucose 83 04/27/2017 05:45 AM    Glucose 139 04/26/2017 09:15 PM          Care coordination with Nursing/Consultants/staff: 5  Prior history, labs, and charting reviewed: 15    Procedures/Imaging:  Ct head 4/26  Ct abd 4/27  MRI head 4/28    Total time spent with chart review, patient examination/education, discussion with staff on case,documentation and medication management / adjustment  :  30 Minutes      Dr Velázquez Brochure  Pager: [phone]

## 2017-04-30 LAB
ANION GAP BLD CALC-SCNC: 13 MMOL/L (ref 3–18)
BASOPHILS # BLD AUTO: 0 K/UL (ref 0–0.06)
BASOPHILS # BLD: 0 % (ref 0–2)
BUN SERPL-MCNC: 4 MG/DL (ref 7–18)
BUN/CREAT SERPL: 11 (ref 12–20)
CALCIUM SERPL-MCNC: 7.8 MG/DL (ref 8.5–10.1)
CHLORIDE SERPL-SCNC: 103 MMOL/L (ref 100–108)
CO2 SERPL-SCNC: 19 MMOL/L (ref 21–32)
CREAT SERPL-MCNC: 0.35 MG/DL (ref 0.6–1.3)
DIFFERENTIAL METHOD BLD: ABNORMAL
EOSINOPHIL # BLD: 0.1 K/UL (ref 0–0.4)
EOSINOPHIL NFR BLD: 1 % (ref 0–5)
ERYTHROCYTE [DISTWIDTH] IN BLOOD BY AUTOMATED COUNT: 13.3 % (ref 11.6–14.5)
GLUCOSE SERPL-MCNC: 99 MG/DL (ref 74–99)
HCT VFR BLD AUTO: 34.6 % (ref 35–45)
HGB BLD-MCNC: 12.4 G/DL (ref 12–16)
LYMPHOCYTES # BLD AUTO: 47 % (ref 21–52)
LYMPHOCYTES # BLD: 2.5 K/UL (ref 0.9–3.6)
MCH RBC QN AUTO: 30.5 PG (ref 24–34)
MCHC RBC AUTO-ENTMCNC: 35.8 G/DL (ref 31–37)
MCV RBC AUTO: 85.2 FL (ref 74–97)
MONOCYTES # BLD: 0.5 K/UL (ref 0.05–1.2)
MONOCYTES NFR BLD AUTO: 9 % (ref 3–10)
NEUTS SEG # BLD: 2.3 K/UL (ref 1.8–8)
NEUTS SEG NFR BLD AUTO: 43 % (ref 40–73)
PLATELET # BLD AUTO: 179 K/UL (ref 135–420)
PMV BLD AUTO: 10.7 FL (ref 9.2–11.8)
POTASSIUM SERPL-SCNC: 4 MMOL/L (ref 3.5–5.5)
RBC # BLD AUTO: 4.06 M/UL (ref 4.2–5.3)
SODIUM SERPL-SCNC: 135 MMOL/L (ref 136–145)
WBC # BLD AUTO: 5.3 K/UL (ref 4.6–13.2)

## 2017-04-30 PROCEDURE — 74011250636 HC RX REV CODE- 250/636: Performed by: FAMILY MEDICINE

## 2017-04-30 PROCEDURE — 74011000258 HC RX REV CODE- 258: Performed by: INTERNAL MEDICINE

## 2017-04-30 PROCEDURE — 80048 BASIC METABOLIC PNL TOTAL CA: CPT | Performed by: INTERNAL MEDICINE

## 2017-04-30 PROCEDURE — 65660000000 HC RM CCU STEPDOWN

## 2017-04-30 PROCEDURE — 74011250637 HC RX REV CODE- 250/637: Performed by: INTERNAL MEDICINE

## 2017-04-30 PROCEDURE — 74011250636 HC RX REV CODE- 250/636: Performed by: INTERNAL MEDICINE

## 2017-04-30 PROCEDURE — 74011250637 HC RX REV CODE- 250/637: Performed by: FAMILY MEDICINE

## 2017-04-30 PROCEDURE — 36415 COLL VENOUS BLD VENIPUNCTURE: CPT | Performed by: INTERNAL MEDICINE

## 2017-04-30 PROCEDURE — 85025 COMPLETE CBC W/AUTO DIFF WBC: CPT | Performed by: INTERNAL MEDICINE

## 2017-04-30 RX ORDER — QUETIAPINE FUMARATE 25 MG/1
50 TABLET, FILM COATED ORAL
Status: DISCONTINUED | OUTPATIENT
Start: 2017-04-30 | End: 2017-05-19 | Stop reason: HOSPADM

## 2017-04-30 RX ORDER — SCOLOPAMINE TRANSDERMAL SYSTEM 1 MG/1
1.5 PATCH, EXTENDED RELEASE TRANSDERMAL
Status: DISCONTINUED | OUTPATIENT
Start: 2017-04-30 | End: 2017-05-19 | Stop reason: HOSPADM

## 2017-04-30 RX ADMIN — QUETIAPINE FUMARATE 50 MG: 25 TABLET, FILM COATED ORAL at 22:14

## 2017-04-30 RX ADMIN — DEXTROSE MONOHYDRATE, SODIUM CHLORIDE, AND POTASSIUM CHLORIDE: 50; 4.5; 2.98 INJECTION, SOLUTION INTRAVENOUS at 04:57

## 2017-04-30 RX ADMIN — CEFTRIAXONE SODIUM 1 G: 1 INJECTION, POWDER, FOR SOLUTION INTRAMUSCULAR; INTRAVENOUS at 23:36

## 2017-04-30 RX ADMIN — ONDANSETRON 4 MG: 2 INJECTION INTRAMUSCULAR; INTRAVENOUS at 11:59

## 2017-04-30 RX ADMIN — TRAMADOL HYDROCHLORIDE 50 MG: 50 TABLET, FILM COATED ORAL at 00:42

## 2017-04-30 RX ADMIN — ENOXAPARIN SODIUM 40 MG: 40 INJECTION SUBCUTANEOUS at 04:56

## 2017-04-30 RX ADMIN — CEFTRIAXONE SODIUM 1 G: 1 INJECTION, POWDER, FOR SOLUTION INTRAMUSCULAR; INTRAVENOUS at 00:34

## 2017-04-30 RX ADMIN — CITALOPRAM HYDROBROMIDE 20 MG: 20 TABLET ORAL at 09:36

## 2017-04-30 RX ADMIN — DEXTROSE MONOHYDRATE, SODIUM CHLORIDE, AND POTASSIUM CHLORIDE: 50; 4.5; 2.98 INJECTION, SOLUTION INTRAVENOUS at 01:21

## 2017-04-30 RX ADMIN — TRAMADOL HYDROCHLORIDE 50 MG: 50 TABLET, FILM COATED ORAL at 06:25

## 2017-04-30 RX ADMIN — TRAMADOL HYDROCHLORIDE 50 MG: 50 TABLET, FILM COATED ORAL at 12:44

## 2017-04-30 NOTE — PROGRESS NOTES
Bedside report received on pt while asleep , she is arousable but not communicating. Pt is w/o Piv at this moment, right arm Piv infiltrated, arm elevated on a pillow with +2 non pitting. RN will attempt an IV for night meds. 2305: Significant other at bedside, no complain voiced, he left a picture in pt's room. 0502: pt resting quietly, no signs of distress noted. Bedside and Verbal shift change report given to Bennie Jacobo RN (oncoming nurse) by Smitha Carrillo RN (offgoing nurse). Report included the following information SBAR, Kardex, Intake/Output, MAR and Recent Results.

## 2017-04-30 NOTE — ROUTINE PROCESS
0730  Bedside and Verbal shift change report given to Claudene Gulling (oncoming nurse) by Yara Ruvalcaba (offgoing nurse). Report included the following information SBAR, Kardex, Intake/Output and MAR.     1300  Pt speaking w/ Telepsych    1500  Reassessment complete. No change in pt condition    1700  Removed R wrist IV d/t infiltration. Pt refusing new IV placement stating, \"I just need the night off\"    1900  Bedside and Verbal shift change report given to Miracle Bronson (oncoming nurse) by Claudene Gulling (offgoing nurse). Report included the following information SBAR, Kardex, Intake/Output and MAR.

## 2017-04-30 NOTE — PROGRESS NOTES
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    Pain in joint, multiple sites M25.50    Cervicalgia M54.2    Chronic low back pain M54.5, G89.29    Migraine without aura G43.009    Myalgia and myositis [id number]    Encounter for long-term (current) use of high-risk medication Z79.899    Enthesopathy of hip region M76.899    Tobacco abuse Z72.0    Chronic headache disorder R51    Drug-induced constipation K59.03    Muscle spasms of neck M62.838    RLS (restless legs syndrome) G25.81    Depression F32.9    Dehydration E86.0    Hypokalemia E87.6    Lactic acidosis E87.2    Polycythemia (HCC) D75.1    Leukocytosis D72.829    UTI (urinary tract infection) N39.0    Encephalopathy G93.40    Elevated liver enzymes R74.8       Assesment     56F presents with worsening AMS. Not eating nor drinking for 5 days. PMHx remarkable for COPD, major depression and attempted suicide requiring inpatient py stay 10/2016. CT head in ER wnl. Severe hypokalemia 1.9 and +UA she was admitted and K+ replaced via oral and IV routes, on emperic rocephin urine culture negative. Psych consulted 4/28 but did not have a clear picture of patients picture and was unable to reach boyfriend who is the only known contact, patient has no other family. Celexa recommended and started 4/29. MRI head completed showing only mild atrophy/SVIC. Patient continues to be delusional about her thick saliva, and gets tachypnic when anyone comes near her. She also states she is \"not in control of parts of her body\" however clearly is. She is AOx5. I cannot explain the mild transamnitis on presentation. Hepatitis panel neg. Planned repeat psych consult 4/30. CLAUDIA neg.  CRP low normal. She has received 4 days of antibiotics for culture negative UTI, K+ replaced, she is medically clear as of 4/30. I suspect she has severe depression with delusions/psychosis and would benefit from inpatient psychiatric care. Acute Encephalopathy vs psychosis  - replace Potassium and rehydrate patient, Tx UTI  - CT head wnl  - Psych eval today as lytes near normal and infection treated for 30hrs.     Dehydration with AICHA  Cr 1.17 to 0.5 -> 0.3 with IVF      Severe Hypokalemia   - K 1.9 ->2.8 ->40mEQ ER, now 40meQ in drip q8h add 40meq KCL-resolving     Lactic acidosis  - 2/2 hypovolemia-resolved     Polycythemia   - 2/2 hemoconcentration from dehydration--resolved    Leukocytosis   - reactive vs infection, monitor -resolving     UTI   - change to rocephin. UA suggesting mild UTI. No Cx data. Tx 5d total     Hyponatremia   -resolved. Does not fit with dehydration, possible malnutrition/poor po intake     Transamnitis  - UDS neg, Hepatitis panel pending. Etiology uncertain, suspect fatty liver.     Copd   -prn duonebs     Possible Dysphagia   - Speech eval      Depression disorder with Anxiety. Hx of prior psychiatric stay, suicide attempt x1  - resume seroquel 100, celexa 20 today, po ativan  (klonopin at home)  - psych admission 10/2-10/17 2016 for suicide attempt    DEBO  Rx for claritan on discharge. DVT Protocol Active: yes  Code Status:  Full Code     Disposition: Unk. Subjective:     CC: Altered mental status (decreased PO, incontinent, weakness)    Interval History: MRI head showing only mild atrophy/SVIC. Patient continues to be delusional about her saliva, and gets tachypnic when anyone comes near her. Today she now reports some nausea and vomitting due to her saliva. She seems to have an excuse for everything. Plan repeat psych consult. CLAUDIA neg. CRP low normal. She has received 4 days of antibiotics for culture negative UTI, K+ replaced, she is medically clear.        Objective:     Visit Vitals    BP (!) 153/101  Comment: олег alonso is aware    Pulse 98    Temp 97.8 °F (36.6 °C)    Resp 20    Ht 5' 4\" (1.626 m)    Wt 68.6 kg (151 lb 3.2 oz)    SpO2 96%    BMI 25.95 kg/m2       Temp (24hrs), Av.1 °F (36.7 °C), Min:97.7 °F (36.5 °C), Max:98.9 °F (37.2 °C)        Intake/Output Summary (Last 24 hours) at 17 0939  Last data filed at 17 0747   Gross per 24 hour   Intake          3224.99 ml   Output             1300 ml   Net          1924.99 ml       Gen: NAD does have orientation AOx5  HEENT:  ARIES, EOMI. Neck: No Bruits/JVD   Lungs:   CTAB. Good respiratory effort  Heart:   RR S1 S2 without M/R/G  Abdomen: ND,NT, BSX4,   Extremities:   No LE edema. No cyanosis.   Skin:  no jaundice/lesions      Data Review:     Meds/Labs/Tests reviewed    Current Shift:  701 - 1900  In: -   Out: 600 [Urine:600]  Last three shifts:  1901 -  07  In: 6513 [P.O.:375; I.V.:4600]  Out: 2000 [Urine:2000]  Recent Labs      17   WBC  5.3  5.7  6.0   RBC  4.06*  3.91*  3.70*   HGB  12.4  12.0  11.2*   HCT  34.6*  33.8*  31.3*   PLT  179  163  162   GRANS  43  44  44   LYMPH  47  47  47   EOS  1  3  2       Recent Labs      172  177  17   0430  17   BUN  4*  2*  6*  10   CREA  0.35*  0.31*  0.35*  0.51*   CA  7.8*  7.5*  7.2*  7.3*   K  4.0  3.7  2.8*  2.8*   NA  135*  135*  137  136   CL  103  103  104  104   CO2  19*  23  23  22   GLU  99  93  105*  118*        Lab Results   Component Value Date/Time    Glucose 99 2017 03:52 AM    Glucose 93 2017 04:07 AM    Glucose 105 2017 04:30 AM    Glucose 118 2017 08:15 PM    Glucose 83 2017 05:45 AM          Care coordination with Nursing/Consultants/staff: 5  Prior history, labs, and charting reviewed: 15    Procedures/Imaging:  Ct head   Ct abd   MRI head     Total time spent with chart review, patient examination/education, discussion with staff on case,documentation and medication management / adjustment  :  30 Minutes      Dr Denise Vincent  Pager: [phone]

## 2017-05-01 LAB
ALBUMIN SERPL BCP-MCNC: 2.7 G/DL (ref 3.4–5)
ALBUMIN/GLOB SERPL: 0.9 {RATIO} (ref 0.8–1.7)
ALP SERPL-CCNC: 107 U/L (ref 45–117)
ALT SERPL-CCNC: 116 U/L (ref 13–56)
ANION GAP BLD CALC-SCNC: 12 MMOL/L (ref 3–18)
AST SERPL W P-5'-P-CCNC: 51 U/L (ref 15–37)
BASOPHILS # BLD AUTO: 0 K/UL (ref 0–0.06)
BASOPHILS # BLD: 0 % (ref 0–2)
BILIRUB SERPL-MCNC: 1 MG/DL (ref 0.2–1)
BUN SERPL-MCNC: 9 MG/DL (ref 7–18)
BUN/CREAT SERPL: 24 (ref 12–20)
CALCIUM SERPL-MCNC: 8.5 MG/DL (ref 8.5–10.1)
CHLORIDE SERPL-SCNC: 103 MMOL/L (ref 100–108)
CO2 SERPL-SCNC: 20 MMOL/L (ref 21–32)
CREAT SERPL-MCNC: 0.38 MG/DL (ref 0.6–1.3)
DIFFERENTIAL METHOD BLD: ABNORMAL
EOSINOPHIL # BLD: 0.1 K/UL (ref 0–0.4)
EOSINOPHIL NFR BLD: 1 % (ref 0–5)
ERYTHROCYTE [DISTWIDTH] IN BLOOD BY AUTOMATED COUNT: 13.6 % (ref 11.6–14.5)
GLOBULIN SER CALC-MCNC: 2.9 G/DL (ref 2–4)
GLUCOSE SERPL-MCNC: 100 MG/DL (ref 74–99)
HCT VFR BLD AUTO: 36.6 % (ref 35–45)
HGB BLD-MCNC: 12.9 G/DL (ref 12–16)
LYMPHOCYTES # BLD AUTO: 30 % (ref 21–52)
LYMPHOCYTES # BLD: 1.9 K/UL (ref 0.9–3.6)
MCH RBC QN AUTO: 30.6 PG (ref 24–34)
MCHC RBC AUTO-ENTMCNC: 35.2 G/DL (ref 31–37)
MCV RBC AUTO: 86.9 FL (ref 74–97)
MONOCYTES # BLD: 0.7 K/UL (ref 0.05–1.2)
MONOCYTES NFR BLD AUTO: 11 % (ref 3–10)
NEUTS SEG # BLD: 3.7 K/UL (ref 1.8–8)
NEUTS SEG NFR BLD AUTO: 58 % (ref 40–73)
PLATELET # BLD AUTO: 185 K/UL (ref 135–420)
PMV BLD AUTO: 10.3 FL (ref 9.2–11.8)
POTASSIUM SERPL-SCNC: 4.3 MMOL/L (ref 3.5–5.5)
PROT SERPL-MCNC: 5.6 G/DL (ref 6.4–8.2)
RBC # BLD AUTO: 4.21 M/UL (ref 4.2–5.3)
SODIUM SERPL-SCNC: 135 MMOL/L (ref 136–145)
WBC # BLD AUTO: 6.3 K/UL (ref 4.6–13.2)

## 2017-05-01 PROCEDURE — 74011250636 HC RX REV CODE- 250/636: Performed by: FAMILY MEDICINE

## 2017-05-01 PROCEDURE — 97535 SELF CARE MNGMENT TRAINING: CPT

## 2017-05-01 PROCEDURE — 65660000000 HC RM CCU STEPDOWN

## 2017-05-01 PROCEDURE — 36415 COLL VENOUS BLD VENIPUNCTURE: CPT | Performed by: INTERNAL MEDICINE

## 2017-05-01 PROCEDURE — 80053 COMPREHEN METABOLIC PANEL: CPT | Performed by: INTERNAL MEDICINE

## 2017-05-01 PROCEDURE — 74011250637 HC RX REV CODE- 250/637: Performed by: FAMILY MEDICINE

## 2017-05-01 PROCEDURE — 85025 COMPLETE CBC W/AUTO DIFF WBC: CPT | Performed by: INTERNAL MEDICINE

## 2017-05-01 PROCEDURE — 74011250637 HC RX REV CODE- 250/637: Performed by: INTERNAL MEDICINE

## 2017-05-01 PROCEDURE — 97110 THERAPEUTIC EXERCISES: CPT

## 2017-05-01 PROCEDURE — 92526 ORAL FUNCTION THERAPY: CPT

## 2017-05-01 RX ORDER — ACETAMINOPHEN 325 MG/1
650 TABLET ORAL
Status: DISCONTINUED | OUTPATIENT
Start: 2017-05-01 | End: 2017-05-19 | Stop reason: HOSPADM

## 2017-05-01 RX ADMIN — QUETIAPINE FUMARATE 50 MG: 25 TABLET, FILM COATED ORAL at 21:39

## 2017-05-01 RX ADMIN — ENOXAPARIN SODIUM 40 MG: 40 INJECTION SUBCUTANEOUS at 04:28

## 2017-05-01 RX ADMIN — TRAMADOL HYDROCHLORIDE 50 MG: 50 TABLET, FILM COATED ORAL at 10:24

## 2017-05-01 RX ADMIN — CITALOPRAM HYDROBROMIDE 20 MG: 20 TABLET ORAL at 08:37

## 2017-05-01 RX ADMIN — TRAMADOL HYDROCHLORIDE 50 MG: 50 TABLET, FILM COATED ORAL at 20:24

## 2017-05-01 NOTE — PROGRESS NOTES
Chart reviewed. Noted psych consult rec in-pt psych, however she is refusing. Call placed to   [facility], spoke with Berry Morales, states someone will be out to assess pt. Will cont to follow. Janette Howard RN,ext. [phone].

## 2017-05-01 NOTE — PROGRESS NOTES
[facility] worker here to screen pt for TDO for in-pt psych, states pt does not meet criteria for TDO. Paperwork placed in paper chart. Met with pt to discuss disposition, states her boyfriend cannot take care of her & she cannot take care of herself, asked her if she wants to go to nursing home, states yes, \"but a good one\". As pt has had recent in-pt psych admission will need level 2 screening. Posted in e-discharge. Janette Howard RN,ext. [phone].

## 2017-05-01 NOTE — ROUTINE PROCESS
1933  Bedside and Verbal shift change report given to Amanda HAMILTON RN (oncoming nurse) by Alma HOLLIS RN (offgoing nurse). Report included the following information SBAR, Kardex, Intake/Output and MAR. Pt awake in bed watching TV.    2013  Shift assessment completed. Reports generalized pain level of 10/10.     2024  PRN tramadol provided. Will reassess. 2108  Pain reassessment 8/10. Pt reports that pain med did not help relieve her pain. Advised pt that the pain may take a little while to subside. Will continue to monitor. [place] to Dr. Alexandra Rubin about pt pain med, new orders received for PRN tylenol based on pt home meds. . Pt asleep. Will continue to monitor. Pt states that she used to take morphine, percocet before. Last time taken was about a year ago. Pt was advised that the doctor will have to reassess her again. Pt verbalized understanding. 0443  Pain level of 10/10 reported. 0447  PRN pain med given. Will reassess. 7077  Pain reassessment 9/10. Will continue to monitor. Pt had no urine output through out shift. Bladder scan 189 ml. Will continue to monitor. 4880  Bedside and Verbal shift change report given to  Josie NARVAEZ RN (oncoming nurse) by Ladarius HAMILTON RN(offgoing nurse). Report included the following information SBAR, Kardex, Intake/Output and MAR. Pt resting quietly in bed.

## 2017-05-01 NOTE — PROGRESS NOTES
Problem: Dysphagia (Adult)  Goal: *Acute Goals and Plan of Care (Insert Text)    Recommendations:  Diet: mech-soft/ground with thin liquids (NO STRAWS)  Meds: one at a time  Aspiration Precautions  Oral Care TID    Goals: Patient will:  1. Tolerate PO trials with 0 s/s overt distress in 4/5 trials - goal met  2. Utilize compensatory swallow strategies/maneuvers (decrease bite/sip, size/rate, alt. liq/sol) with min cues in 4/5 trials - goal met  3. Complete an objective swallow study (i.e., MBSS) to assess swallow integrity, r/o aspiration, and determine of safest LRD, min A -- goal met    Outcome: Resolved/Met Date Met:  05/01/17  SPEECH LANGUAGE PATHOLOGY DYSPHAGIA TREATMENT     Patient: Jay Merlos (86 y.o. female)  Date: 5/1/2017  Diagnosis: Hypokalemia  UTI (urinary tract infection)  Dehydration  Encephalopathy  Polycythemia (HCC)  Elevated liver enzymes  Lactic acidosis  Leukocytosis Encephalopathy       Precautions: aspiration Fall      ASSESSMENT:  Patient seen for swallowing therapy. Required max encouragement for participation. Patient with thin liquids with straw at bedside. Reports no coughing with meals. Reeducated on recommendations for thin liquids no straws; verbalized comprehension. Patient seen with thin liquids (+/-) straw with 0 s/sx of aspiration. Patient refusing solids trials due to reports of increased saliva in oral cavity. Patient educated on importance of safe swallowing guidelines (small bites/sips, NO STAWS, decreased rate, alt solids/liquids, HOB >60 for all PO intake); verbalized comprehension. Posted at bedside. Maximum therapeutic gains met; safest, least restrictive diet achieved. D/C ST intervention at this time.    Progression toward goals:  [X]         Improving appropriately and progressing toward goals  [ ]         Improving slowly and progressing toward goals  [ ]         Not making progress toward goals and plan of care will be adjusted       PLAN:  Recommendations and Planned Interventions:  Dental soft, thin liquids no straws  Patient continues to benefit from skilled intervention to address the above impairments. Continue treatment per established plan of care. Discharge Recommendations:  Home Health       SUBJECTIVE:   Patient stated I dont have a problem swallowing. OBJECTIVE:   Cognitive and Communication Status:  Neurologic State: Alert  Orientation Level: Oriented X4  Cognition: Poor safety awareness  Perception: Appears intact  Perseveration: No perseveration noted  Safety/Judgement: Awareness of environment  Dysphagia Treatment:  Oral Assessment:  Oral Assessment  Labial: No impairment  Dentition: Poor, Limited  Oral Hygiene: poor  Lingual: No impairment  Velum: No impairment  Mandible: No impairment  P.O. Trials:              Patient Position: [facility]              Vocal quality prior to P.O.: No impairment              Consistency Presented:  Thin liquid              How Presented: Straw              How Much: 6              Bolus Acceptance: No impairment              Bolus Formation/Control: No impairment              Type of Impairment: Delayed, Mastication, Oral regurgitation              Propulsion: No impairment              Oral Residue: None              Initiation of Swallow: No impairment              Laryngeal Elevation: Functional              Aspiration Signs/Symptoms: None              Pharyngeal Phase Characteristics: No impairment, issues, or problems               Effective Modifications: None              Cues for Modifications: Maximal                                Oral Phase Severity: Mild              Pharyngeal Phase Severity : Mild                PAIN:  Start of Tx: 0  End of Tx: 0      After treatment:   [ ]              Patient left in no apparent distress sitting up in chair  [X]              Patient left in no apparent distress in bed  [X]              Call bell left within reach  [X]              Nursing notified  [ ] Family present  [ ]              Caregiver present  [ ]              Bed alarm activated         COMMUNICATION/EDUCATION:   [X]        Aspiration precautions; swallow safety; compensatory techniques  [ ]        Patient unable to participate in education; education ongoing with staff  [ ]         Posted safety precautions in patient's room.   [ ]         Oral-motor/laryngeal strengthening exercises        Pepito Dixon  [facility] SLP  Time Calculation: 8 mins

## 2017-05-01 NOTE — PROGRESS NOTES
conducted a Follow up consultation and Spiritual Assessment for Ruperto Diallo, who is a 60 y.o.,female. The  provided the following Interventions:  Continued the relationship of care and support. Listened empathically. Offered prayer and assurance of continued prayer on patients behalf. Chart reviewed. The following outcomes were achieved:  Patient expressed gratitude for pastoral care visit. Assessment:  There are no further spiritual or Religion issues which require Spiritual Care Services interventions at this time. Plan:  Chaplains will continue to follow and will provide pastoral care on an as needed/requested basis.  recommends bedside caregivers page  on duty if patient shows signs of acute spiritual or emotional distress.       88 [facility]   Staff [address] [place] [address]   [phone]

## 2017-05-01 NOTE — PROGRESS NOTES
Internal Medicine Progress Note    Patient's Name: William Ngo Date: 4/26/2017  Length of Stay: 4      Assessment/Plan     #UTI  #Transaminitis Likely 2/2 Fatty Liver  #COPD  #Dysphagia  #Depression w/ Possible Component of Psychosis  #Somatic Delusions  #Anxiety Disoder  #Seasonal Allergies  #Acute Metabolic Encephalopathy, resolved  #AICHA, resolved  #Hypokalemia, resolved  #Lactic Acidosis, resolved  #DVT PPx    - Completed full course of antibx. Will d/c Rocephin today  - Cont celexa, seroquel, ativan PRN. Appreciate psych consult  - Lovenox    Pt requires SNF per PT. Does not meet inpatient criteria for TDO per [facility]. Will require level 2 screening as she cannot take care of herself and has had recent in-pt psych admission. I have personally reviewed all pertinent labs and films that have officially resulted over the last 24 hours. I have personally checked for all pending labs that are awaiting final results. Subjective     Pt s/e @ bedside  No major events overnight  Pt states saliva has sand in it  States numbness and pain all over and unable to use her hands although her meal was completely finished on the tray well above where she states she was unable to move hands.  No assistance given by staff for her  Denies CP or SOB    Objective     Visit Vitals    /71 (BP 1 Location: Left arm)    Pulse (!) 106    Temp 98.9 °F (37.2 °C)    Resp 22    Ht 5' 4\" (1.626 m)    Wt 68.6 kg (151 lb 3.2 oz)    SpO2 91%    BMI 25.95 kg/m2       Physical Exam:  General Appearance: NAD, conversant  Lungs: CTA with normal respiratory effort  CV: RRR, no m/r/g  Abdomen: soft, non-tender, normal bowel sounds  Extremities: no cyanosis, no peripheral edema  Psych: odd affect, alert and oriented to person, place and time    Lab/Data Reviewed:  BMP:   Lab Results   Component Value Date/Time     (L) 05/01/2017 04:45 AM    K 4.3 05/01/2017 04:45 AM     05/01/2017 04:45 AM    CO2 20 (L) 05/01/2017 04:45 AM    AGAP 12 05/01/2017 04:45 AM     (H) 05/01/2017 04:45 AM    BUN 9 05/01/2017 04:45 AM    CREA 0.38 (L) 05/01/2017 04:45 AM    GFRAA >60 05/01/2017 04:45 AM    GFRNA >60 05/01/2017 04:45 AM     CBC:   Lab Results   Component Value Date/Time    WBC 6.3 05/01/2017 04:45 AM    HGB 12.9 05/01/2017 04:45 AM    HCT 36.6 05/01/2017 04:45 AM     05/01/2017 04:45 AM       Imaging Reviewed:  No results found.     Medications Reviewed:  Current Facility-Administered Medications   Medication Dose Route Frequency    scopolamine (TRANSDERM-SCOP) 1.5 mg  1.5 mg TransDERmal Q72H    QUEtiapine (SEROquel) tablet 50 mg  50 mg Oral QHS    citalopram (CELEXA) tablet 20 mg  20 mg Oral DAILY    benzocaine-menthol (CEPACOL) lozenge 1 Lozenge  1 Lozenge Mucous Membrane PRN    cefTRIAXone (ROCEPHIN) 1 g in 0.9% sodium chloride (MBP/ADV) 50 mL MBP  1 g IntraVENous Q24H    enoxaparin (LOVENOX) injection 40 mg  40 mg SubCUTAneous Q24H    ondansetron (ZOFRAN) injection 4 mg  4 mg IntraVENous Q4H PRN    traMADol (ULTRAM) tablet 50 mg  50 mg Oral Q6H PRN    LORazepam (ATIVAN) tablet 1 mg  1 mg Oral Q6H PRN    oxymetazoline (AFRIN) 0.05 % nasal spray 2 Spray  2 Spray Both Nostrils BID PRN    sodium chloride (NS) flush 5-10 mL  5-10 mL IntraVENous PRN           Tawny Bullard DO  Internal Medicine, Hospitalist  Pager: [phone]  [address] [place] [address] Drive Group

## 2017-05-01 NOTE — ROUTINE PROCESS
Bedside and Verbal shift change report rec'd from Delmis Kiran, [address] [facility] (offgoing nurse). Report included the following information SBAR, Kardex, Intake/Output, MAR and Recent Results. Introduced myself to patient ( acquaintance from > 25 yrs ago), pt has no objections to my involvement in her care. 0730 Assessment completed. 0 IDR's with Dr. Dot Garcia. Order rec'd to Jozef abraham  4342 Tera price'd. Pt tolerated procedure without difficulty. 1935 Bedside and Verbal shift change given to Patience RN. Report included the following information SBAR, Kardex, Intake/Output, MAR, POC, Tele ST and Recent Results.

## 2017-05-01 NOTE — PROGRESS NOTES
conducted a Follow up consultation and Spiritual Assessment for Blessing Dunlap, who is a 60 y.o.,female. The  provided the following Interventions:  Continued the relationship of care and support. Listened empathically. Offered prayer and assurance of continued prayer on patients behalf. Chart reviewed. The following outcomes were achieved:  Patient expressed gratitude for pastoral care visit. Assessment:  There are no further spiritual or Baptism issues which require Spiritual Care Services interventions at this time. Plan:  Chaplains will continue to follow and will provide pastoral care on an as needed/requested basis.  recommends bedside caregivers page  on duty if patient shows signs of acute spiritual or emotional distress.      88 [facility]   Staff [address] [facility]   [phone]

## 2017-05-01 NOTE — PROGRESS NOTES
Problem: Self Care Deficits Care Plan (Adult)  Goal: *Acute Goals and Plan of Care (Insert Text)  Occupational Therapy Goals  Initiated 4/29/2017 within 7 day(s). 1. Patient will perform self-feeding with minimal assistance/contact guard assist, adaptive strategies prn.   2. Patient will perform grooming with minimal assistance/contact guard assist.  3. Patient will perform upper body dressing with minimal assistance/contact guard assist.  4. Patient will perform functional task for 3 minutes while seated on EOB with mod assist for balance. 5. Patient will participate in upper extremity therapeutic exercise/activities with minimal assistance/contact guard assist for 8 minutes to increase strength/endurance for ADLs. Outcome: Progressing Towards Goal  OCCUPATIONAL THERAPY TREATMENT     Patient: Sea Blanco (89 y.o. female)  Date: 5/1/2017  Diagnosis: Hypokalemia  UTI (urinary tract infection)  Dehydration  Encephalopathy  Polycythemia (HCC)  Elevated liver enzymes  Lactic acidosis  Leukocytosis Encephalopathy       Precautions: Fall  Chart, occupational therapy assessment, plan of care, and goals were reviewed. ASSESSMENT:  Pt presents w/BUE weakness and ulnar deviation. Pt performs BUE elbow flexion against gravity, requires assist w/BUEmovement in all other planes. Poor FM strength/coordination requires Max Assist w/ADL grooming tasks, opening/applying toothpaste. Provided hand over hand assist w/oral care bringing toothbrush to mouth w/RUE. Provided pt w/resistive \"glove ball\" for increase  strength for ADL carryover.   EDUCATION Pt educated on importance of UE TherEx and encouraged to perform throughout the day  Progression toward goals:  [ ]          Improving appropriately and progressing toward goals  [X]          Improving slowly and progressing toward goals  [ ]          Not making progress toward goals and plan of care will be adjusted       PLAN:  Patient continues to benefit from skilled intervention to address the above impairments. Continue treatment per established plan of care. Discharge Recommendations:  Skilled Nursing Facility/LTC  Further Equipment Recommendations for Discharge:   TBD pending progress       SUBJECTIVE:   Patient stated Look at me, I can't.       OBJECTIVE DATA SUMMARY:         Cognitive/Behavioral Status:  Neurologic State: Alert  Orientation Level: Oriented X4  Cognition: Follows commands  Safety/Judgement: Awareness of environment  Functional Mobility and Transfers for ADLs:  ADL Intervention:  Grooming  Brushing Teeth: Maximum assistance     Cognitive Retraining  Safety/Judgement: Awareness of environment     Therapeutic Exercises:   AAROM > AROM BUE shoulder/elbow/wrist flexion/extension  BUE hand squeezes w/resistive \"glove\" ball     Pain:  Pre Treatment:0  Post Treatment:0  Pt c/o BUE shoulder pain w/shoulder flexion     Activity Tolerance:    Poor     Please refer to the flowsheet for vital signs taken during this treatment.   After treatment:   [ ]  Patient left in no apparent distress sitting up in chair  [X]  Patient left in no apparent distress in bed  [X]  Call bell left within reach  [ ]  Nursing notified  [ ]  Caregiver present  [ ]  Bed alarm activated     ANGÉLICA Woods  Time Calculation: 23 mins

## 2017-05-01 NOTE — PROGRESS NOTES
Visited with patient this morning. Patient in good spirits and has a positive attitude despite of illness. Doesn't appear to be angry, sad, frustrated or ashamed. Patient may be a little anxious but appears to be in control. Her behavior is appropriate and seems to be consistent wit her goal attainment. I did not see any self defeating behavior. Life is meaningful and she has a connection to a higher power. Patient is palliative care.       88 [facility]   Staff [address] [place] [address]   [phone]

## 2017-05-02 LAB
ALBUMIN SERPL BCP-MCNC: 2.6 G/DL (ref 3.4–5)
ALBUMIN/GLOB SERPL: 0.9 {RATIO} (ref 0.8–1.7)
ALP SERPL-CCNC: 98 U/L (ref 45–117)
ALT SERPL-CCNC: 94 U/L (ref 13–56)
ANION GAP BLD CALC-SCNC: 12 MMOL/L (ref 3–18)
AST SERPL W P-5'-P-CCNC: 41 U/L (ref 15–37)
BACTERIA SPEC CULT: NORMAL
BACTERIA SPEC CULT: NORMAL
BASOPHILS # BLD AUTO: 0 K/UL (ref 0–0.06)
BASOPHILS # BLD: 0 % (ref 0–2)
BILIRUB SERPL-MCNC: 1 MG/DL (ref 0.2–1)
BUN SERPL-MCNC: 9 MG/DL (ref 7–18)
BUN/CREAT SERPL: 26 (ref 12–20)
CALCIUM SERPL-MCNC: 8.1 MG/DL (ref 8.5–10.1)
CHLORIDE SERPL-SCNC: 102 MMOL/L (ref 100–108)
CO2 SERPL-SCNC: 21 MMOL/L (ref 21–32)
CREAT SERPL-MCNC: 0.35 MG/DL (ref 0.6–1.3)
DIFFERENTIAL METHOD BLD: ABNORMAL
EOSINOPHIL # BLD: 0 K/UL (ref 0–0.4)
EOSINOPHIL NFR BLD: 1 % (ref 0–5)
ERYTHROCYTE [DISTWIDTH] IN BLOOD BY AUTOMATED COUNT: 13.7 % (ref 11.6–14.5)
GLOBULIN SER CALC-MCNC: 2.8 G/DL (ref 2–4)
GLUCOSE SERPL-MCNC: 83 MG/DL (ref 74–99)
HCT VFR BLD AUTO: 34.8 % (ref 35–45)
HGB BLD-MCNC: 12.2 G/DL (ref 12–16)
LYMPHOCYTES # BLD AUTO: 37 % (ref 21–52)
LYMPHOCYTES # BLD: 2.1 K/UL (ref 0.9–3.6)
MCH RBC QN AUTO: 30.6 PG (ref 24–34)
MCHC RBC AUTO-ENTMCNC: 35.1 G/DL (ref 31–37)
MCV RBC AUTO: 87.2 FL (ref 74–97)
MONOCYTES # BLD: 0.5 K/UL (ref 0.05–1.2)
MONOCYTES NFR BLD AUTO: 9 % (ref 3–10)
NEUTS SEG # BLD: 3.1 K/UL (ref 1.8–8)
NEUTS SEG NFR BLD AUTO: 53 % (ref 40–73)
PLATELET # BLD AUTO: 203 K/UL (ref 135–420)
PMV BLD AUTO: 10.2 FL (ref 9.2–11.8)
POTASSIUM SERPL-SCNC: 4.1 MMOL/L (ref 3.5–5.5)
PROT SERPL-MCNC: 5.4 G/DL (ref 6.4–8.2)
RBC # BLD AUTO: 3.99 M/UL (ref 4.2–5.3)
SERVICE CMNT-IMP: NORMAL
SERVICE CMNT-IMP: NORMAL
SODIUM SERPL-SCNC: 135 MMOL/L (ref 136–145)
WBC # BLD AUTO: 5.8 K/UL (ref 4.6–13.2)

## 2017-05-02 PROCEDURE — 51798 US URINE CAPACITY MEASURE: CPT

## 2017-05-02 PROCEDURE — 74011250637 HC RX REV CODE- 250/637: Performed by: FAMILY MEDICINE

## 2017-05-02 PROCEDURE — 85025 COMPLETE CBC W/AUTO DIFF WBC: CPT | Performed by: INTERNAL MEDICINE

## 2017-05-02 PROCEDURE — 74011250636 HC RX REV CODE- 250/636: Performed by: FAMILY MEDICINE

## 2017-05-02 PROCEDURE — 65660000000 HC RM CCU STEPDOWN

## 2017-05-02 PROCEDURE — 97530 THERAPEUTIC ACTIVITIES: CPT

## 2017-05-02 PROCEDURE — 80053 COMPREHEN METABOLIC PANEL: CPT | Performed by: INTERNAL MEDICINE

## 2017-05-02 PROCEDURE — 74011250637 HC RX REV CODE- 250/637: Performed by: INTERNAL MEDICINE

## 2017-05-02 PROCEDURE — 36415 COLL VENOUS BLD VENIPUNCTURE: CPT | Performed by: INTERNAL MEDICINE

## 2017-05-02 PROCEDURE — 97110 THERAPEUTIC EXERCISES: CPT

## 2017-05-02 RX ADMIN — TRAMADOL HYDROCHLORIDE 50 MG: 50 TABLET, FILM COATED ORAL at 09:27

## 2017-05-02 RX ADMIN — TRAMADOL HYDROCHLORIDE 50 MG: 50 TABLET, FILM COATED ORAL at 04:47

## 2017-05-02 RX ADMIN — ENOXAPARIN SODIUM 40 MG: 40 INJECTION SUBCUTANEOUS at 05:32

## 2017-05-02 RX ADMIN — CITALOPRAM HYDROBROMIDE 20 MG: 20 TABLET ORAL at 09:27

## 2017-05-02 RX ADMIN — TRAMADOL HYDROCHLORIDE 50 MG: 50 TABLET, FILM COATED ORAL at 17:32

## 2017-05-02 NOTE — PROGRESS NOTES
0930 Assumed care of patient. Patient up in bed. All am medications given along with PRN tramadol for generalized pain. Assisted patient with eating breakfast. Bed in low position and call bell within reach    1230 Reassessment done and no changes noted. 1800 PRN pain med given for generalized pain. 1830 Patient straight cathed and 300ml of urine obtained. 1900 Bedside shift change report given to  (oncoming nurse) by Hudson Sidhu RN (offgoing nurse). Report included the following information SBAR, Kardex, Intake/Output and MAR.

## 2017-05-02 NOTE — PROGRESS NOTES
[phone]  Dr. Patrick Corral paged due to patient not urinating this shift. Bladder scan showed 351ml of urine in bladder. Straight cathed and 300ml of urine obtained. New orders to straight cath x3 [id number] and to resume abraham if last bladder scan is greather than 350ml.

## 2017-05-02 NOTE — PROGRESS NOTES
Reports 100/10 pain pre treatment. \"Im getting Ultram. I need my Oxycodone and morphine. \"  Initially screaming out in pain with assisted R LE movt. Second attempt and demo'ing hip and knee F/E WNL reports \"don't feel it. \" Requesting Bed pan ; \"I had a catheter since last WED. \" Dependent rolling R<>L x 4 trials . Two person assist to place bed pan. Refuses  to attempt sitting up in chair positon in bed to assist with voiding. Unable to void at this time. Appreciate nsg. assist with reposition up in bed. With max cueing pt able to un mute sound on TV  With L index finger despite  protestations that her hands were numb and she could not. Post treatment pain reported 200/10. Nsg alerted. Minimal to no participation with ROM and refused to attempt sitting EOB. Able to use Yankauer to suction mouth. Will cont to follow per PT POC as pt participation warrants.

## 2017-05-02 NOTE — PROGRESS NOTES
Problem: Self Care Deficits Care Plan (Adult)  Goal: *Acute Goals and Plan of Care (Insert Text)  Occupational Therapy Goals  Initiated 4/29/2017 within 7 day(s). 1. Patient will perform self-feeding with minimal assistance/contact guard assist, adaptive strategies prn.   2. Patient will perform grooming with minimal assistance/contact guard assist.  3. Patient will perform upper body dressing with minimal assistance/contact guard assist.  4. Patient will perform functional task for 3 minutes while seated on EOB with mod assist for balance. 5. Patient will participate in upper extremity therapeutic exercise/activities with minimal assistance/contact guard assist for 8 minutes to increase strength/endurance for ADLs. OCCUPATIONAL THERAPY NOTE     Patient: Yamile Brown (89 y.o. female)  Date: 5/2/2017  Diagnosis: Hypokalemia  UTI (urinary tract infection)  Dehydration  Encephalopathy  Polycythemia (HCC)  Elevated liver enzymes  Lactic acidosis  Leukocytosis Encephalopathy       Precautions: Fall  Chart, occupational therapy assessment, plan of care, and goals were reviewed. Occupational Therapy treatment attempted. Patient is unable to participate due to:  [ ]  Nausea/vomiting  [ ]  Eating  [X]  Pain: Pt reporting 50/10 pain. Informed RN Josie  [ ]  Pt lethargic  [ ]  Off Unit  [ ] Other:  Attempt: Pt c/o pain throughout body even hands and fingers. Will f/u later as schedule allows. Thank you.      ANGÉLICA Ramos/YOCASTA

## 2017-05-03 PROCEDURE — 77030020186 HC BOOT HL PROTCT SAGE -B

## 2017-05-03 PROCEDURE — 51798 US URINE CAPACITY MEASURE: CPT

## 2017-05-03 PROCEDURE — 65660000000 HC RM CCU STEPDOWN

## 2017-05-03 PROCEDURE — 97535 SELF CARE MNGMENT TRAINING: CPT

## 2017-05-03 PROCEDURE — 74011250637 HC RX REV CODE- 250/637: Performed by: FAMILY MEDICINE

## 2017-05-03 PROCEDURE — 77030011943

## 2017-05-03 PROCEDURE — 74011250637 HC RX REV CODE- 250/637: Performed by: INTERNAL MEDICINE

## 2017-05-03 PROCEDURE — 97530 THERAPEUTIC ACTIVITIES: CPT

## 2017-05-03 PROCEDURE — 97110 THERAPEUTIC EXERCISES: CPT

## 2017-05-03 PROCEDURE — 74011250636 HC RX REV CODE- 250/636: Performed by: FAMILY MEDICINE

## 2017-05-03 RX ADMIN — TRAMADOL HYDROCHLORIDE 50 MG: 50 TABLET, FILM COATED ORAL at 08:56

## 2017-05-03 RX ADMIN — TRAMADOL HYDROCHLORIDE 50 MG: 50 TABLET, FILM COATED ORAL at 21:53

## 2017-05-03 RX ADMIN — LORAZEPAM 1 MG: 1 TABLET ORAL at 15:41

## 2017-05-03 RX ADMIN — QUETIAPINE FUMARATE 50 MG: 25 TABLET, FILM COATED ORAL at 21:53

## 2017-05-03 RX ADMIN — QUETIAPINE FUMARATE 50 MG: 25 TABLET, FILM COATED ORAL at 00:24

## 2017-05-03 RX ADMIN — TRAMADOL HYDROCHLORIDE 50 MG: 50 TABLET, FILM COATED ORAL at 00:24

## 2017-05-03 RX ADMIN — TRAMADOL HYDROCHLORIDE 50 MG: 50 TABLET, FILM COATED ORAL at 15:41

## 2017-05-03 RX ADMIN — CITALOPRAM HYDROBROMIDE 20 MG: 20 TABLET ORAL at 08:56

## 2017-05-03 RX ADMIN — ENOXAPARIN SODIUM 40 MG: 40 INJECTION SUBCUTANEOUS at 06:01

## 2017-05-03 NOTE — PROGRESS NOTES
Nutrition follow up/  Plan of care      RECOMMENDATIONS:     1. Dental Soft diet; thin liquids per SLP  2. Ensure BID  3. Monitor weight, labs and PO intake  4. RD to follow     GOALS:     1. Ongoing: PO intake meets >75% of protein/calorie needs by 5/8   2. Ongoing: Weight Maintenance (+/- 1-2 lb by 5/8)       ASSESSMENT:     Weight status is classified as overweight per BMI of 25.9. PO intake is poor. Continue Ensure BID for additional calories/protein. New weight n/a on record. Labs noted. Patient with hyponatremia and hypoalbuminemia. Nutrition recommendations listed. RD to follow. Nutrition Risk:  [] High  [x] Moderate []  Low    SUBJECTIVE/OBJECTIVE:      Patient admitted with AMS. Reviewed SLP note with recommendations for dental soft diet and thin liquids. Patient sleeping. Observed <25% intake of breakfast meal. Noted several Ensure at bedside. Will hold sending more Ensure supplements for time being. Psych is following. Will monitor. Information Obtained from:    [x] Chart Review   [x] Patient   [] Family/Caregiver   [] Nurse/Physician   [] Interdisciplinary Meeting/Rounds    Diet:Dental Soft diet  Medications: [x] Reviewed    Allergies: [x] Reviewed   Encounter Diagnoses     ICD-10-CM ICD-9-CM   1. Acute hypokalemia E87.6 276.8   2. Lactic acidosis E87.2 276.2   3. Dehydration E86.0 276.51   4.  Dysphagia, unspecified type R13.10 787.20     Past Medical History:   Diagnosis Date    Allergic rhinitis     [facility]ina pectoris ([facility])     Anxiety     Bronchitis     Chronic fatigue syndrome     Chronic pain     COPD (chronic obstructive pulmonary disease) ([facility])     Generalized pain     Headache, tension-type     HTN (hypertension)     Hyperlipidemia     Hypokalemia     Insomnia     Leg swelling     Lyme disease     Nicotine dependence     OA (osteoarthritis)     RA (rheumatoid arthritis) ([facility])     Vertigo     Vitamin D deficiency       Labs:    Lab Results   Component Value Date/Time Sodium 135 05/02/2017 05:00 AM    Potassium 4.1 05/02/2017 05:00 AM    Chloride 102 05/02/2017 05:00 AM    CO2 21 05/02/2017 05:00 AM    Anion gap 12 05/02/2017 05:00 AM    Glucose 83 05/02/2017 05:00 AM    BUN 9 05/02/2017 05:00 AM    Creatinine 0.35 05/02/2017 05:00 AM    Calcium 8.1 05/02/2017 05:00 AM    Magnesium 2.4 04/26/2017 09:15 PM    Albumin 2.6 05/02/2017 05:00 AM     Anthropometrics: BMI (calculated): 25.9  Last 3 Recorded Weights in this Encounter    04/26/17 2144 04/27/17 0933   Weight: 68.6 kg (151 lb 3.2 oz) 68.6 kg (151 lb 3.2 oz)      Ht Readings from Last 1 Encounters:   04/27/17 5' 4\" (1.626 m)     Patient Adeline Benitez for the past 100 hrs:   % Diet Eaten   05/02/17 1445 30 %   05/02/17 0930 25 %   05/01/17 1812 40 %   05/01/17 1300 10 %   05/01/17 1007 15 %     IBW: 120 lb %IBW: 126%    Nutrition Needs:   Calories: 9767-5998 Kcal   Protein:   70-82 g      [x] No Cultural, Faith or ethnic dietary need identified.     [] Cultural, Faith and ethnic food preferences identified and addressed     Wt Status:  [] Normal (18.6 - 24.9) [] Underweight (<18.5)   [x] Overweight (25 - 29.9) [] Mild Obesity (30 - 34.9)  [] Moderate Obesity (35 - 39.9) [] Morbid Obesity (40+)     Nutrition Problems Identified:   [x] Suboptimal PO intake   [] Food Allergies  [x] Difficulty chewing/swallowing/poor dentition  [] Constipation/Diarrhea   [] Nausea/Vomiting   [] None  [] Other:     Plan:   [x] Therapeutic Diet  []  Obtained/adjusted food preferences/tolerances and/or snacks options   [x]  Supplements (Ensure BID)   [x] Occupational therapy following for feeding techniques  []  HS snack added   [x]  Modify diet texture   []  Modify diet for food allergies   []  Assist with menu selection   [x]  Monitor PO intake on meal rounds   [x]  Continue inpatient monitoring and intervention   []  Participated in discharge planning/Interdisciplinary rounds/Team meetings   []  Other:     Education Needs:   [x] Not appropriate for teaching at this time    [] Identified and addressed    Nutrition Monitoring and Evaluation:  [x] Continue ongoing monitoring and intervention  [] Mansoor Cantu

## 2017-05-03 NOTE — ROUTINE PROCESS
Bedside and Verbal shift change report given to Juan Ramon Aquino RN (oncoming nurse) by Cherrie Lopez RN (offgoing nurse). Report included the following information SBAR, MAR, Recent Results and Med Rec Status. 2000 - Patient asleep. 0024 - Pt woke up and requested gilberto medication. Pain medication given without incidents. 0200 - Patient back asleep.    0600 - Patient has not voided all night, bladder scanner is showing over 900 mL. Will insert abraham per Dr. Jacey Dominguez. 0700 - Abraham catheter inserted without difficulties. Susie Leyva RN present. Bedside and Verbal shift change report given to Ciara Santo RN (oncoming nurse) by Juan Ramon Aquino RN (offgoing nurse). Report included the following information SBAR, Recent Results and Med Rec Status.

## 2017-05-03 NOTE — PROGRESS NOTES
0845 Bedside and Verbal shift change report given to El Maier RN (oncoming nurse) by Trung Erazo RN (offgoing nurse). Report included the following information SBAR, Procedure Summary, Intake/Output, MAR and Recent Results. Pt resting in bed,       0904 PRN pain meds provided, assessment completed, pt refused bath. Gaurang bey at bedside, will continue to monitor. 1100 Pt resting in bed, pt denies pain at this time. Pt need met, will continue to monitor. 1245 IDRs completed outside of pt room. Discontinue abraham per Dr reyes's orders and continue with bladder scan and straight cath as needed per order. 1410 PT at bedside with patient. Patient uncooperative and screaming. Pt turned and repositioned. Mepilex placed on sacrum. Call bell at bedside. 0 Primary RN and Grace Hitchcock, clinical mgr, at pt bedside to answer family and patient questions. Dr Ciara Villatoro asked to see pt to go over pt condition with family. 1600 Abraham taken out per Dr Ciara Villatoro verbal order. Continue to bladder scan q8h and straight cath if over 350ml for next 24hrs. Will continue to monitor. 1800 Pt still has not voided, will continue to monitor. 1925 Bedside and Verbal shift change report given to Trung Erazo RN (oncoming nurse) by El Maier RN   (offgoing nurse). Report included the following information SBAR, Procedure Summary, Intake/Output, MAR and Recent Results.

## 2017-05-03 NOTE — PROGRESS NOTES
Problem: Self Care Deficits Care Plan (Adult)  Goal: *Acute Goals and Plan of Care (Insert Text)  Occupational Therapy Goals  Initiated 4/29/2017 within 7 day(s). 1. Patient will perform self-feeding with minimal assistance/contact guard assist, adaptive strategies prn.   2. Patient will perform grooming with minimal assistance/contact guard assist.  3. Patient will perform upper body dressing with minimal assistance/contact guard assist.  4. Patient will perform functional task for 3 minutes while seated on EOB with mod assist for balance. 5. Patient will participate in upper extremity therapeutic exercise/activities with minimal assistance/contact guard assist for 8 minutes to increase strength/endurance for ADLs. Outcome: Progressing Towards Goal  OCCUPATIONAL THERAPY TREATMENT     Patient: Collette Corpus (89 y.o. female)  Date: 5/3/2017  Diagnosis: Hypokalemia  UTI (urinary tract infection)  Dehydration  Encephalopathy  Polycythemia (HCC)  Elevated liver enzymes  Lactic acidosis  Leukocytosis Encephalopathy       Precautions: Fall  Chart, occupational therapy assessment, plan of care, and goals were reviewed. ASSESSMENT:  Pt requires encouragement for participation,but is able to tolerate UE TherEx w/gentle ROM TherEx. Pt c/o BUE shoulder pain w/flexion > 90 degrees. BUE weakness L > R. Pt c/o tingling and pulsating sensation in (B) fingertips. Pt poor bilateral coordination requires assist opening containers on meal tray and w/built-up handle on utensil pt is able to perform self-feeding w/distal support at elbow using RUE and [address] for 3 trials. Pt insisting on seeing a neurologist throughout session.   EDUCATION Pt educated on use of adaptive equipment w/built-up utensils  Progression toward goals:  [ ]          Improving appropriately and progressing toward goals  [X]          Improving slowly and progressing toward goals  [ ]          Not making progress toward goals and plan of care will be adjusted       PLAN:  Patient continues to benefit from skilled intervention to address the above impairments. Continue treatment per established plan of care. Discharge Recommendations:  Skilled Nursing Facility/LTC  Further Equipment Recommendations for Discharge:  hospital bed and wheelchair       SUBJECTIVE:   Patient stated I'm not going anywhere until I see a neurologist.      OBJECTIVE DATA SUMMARY:         Cognitive/Behavioral Status:  Neurologic State: Alert  Orientation Level: Oriented X4  Cognition: Follows commands  Safety/Judgement: Awareness of environment  Functional Mobility and Transfers for ADLs:  ADL Intervention:  Feeding  Container Management: Total assistance (dependent)  Utensil Management: Minimum assistance  Food to Mouth: Minimum assistance (distal support at elbow using RUE)  Adaptive Equipment: Built up spoon     Grooming  Washing Hands: Minimum assistance     Therapeutic Exercises:   AAROM LUE shoulder/elbow/wrist flexion/extension, shoulder flexion to 90 degrees  AROM > AAROM RUE shlulder/elbow/wrist flexion/extension, shoulder flexion to 90 degrees     Pain:  Pre Treatment:0  Post Treatment:0  Pt c/o BUE shoulder pain w/flexion > 90 degrees     Activity Tolerance:    Poor     Please refer to the flowsheet for vital signs taken during this treatment.   After treatment:   [ ]  Patient left in no apparent distress sitting up in chair  [X]  Patient left in no apparent distress in bed  [X]  Call bell left within reach  [X]  Pratima COPE, notified  [ ]  Caregiver present  [ ]  Bed alarm activated     ANGÉLICA Woods  Time Calculation: 23 mins

## 2017-05-03 NOTE — PROGRESS NOTES
Problem: Mobility Impaired (Adult and Pediatric)  Goal: *Acute Goals and Plan of Care (Insert Text)  STG for 4.29.17 to be completed by 5.6.17 (7 days). 1. Pt will complete supine to/from sit with CGA in order to complete EOB activity. 2. Pt will complete sit to/from stand transfer with LRAD and mod A in prep for ambulation. 3. Pt will ambulate 5 feet with LRAD and min A in order to negotiate household distances. Outcome: Not Progressing Towards Goal  PHYSICAL THERAPY TREATMENT     Patient: Kimberly Agee (46 y.o. female)  Date: 5/3/2017  Diagnosis: Hypokalemia  UTI (urinary tract infection)  Dehydration  Encephalopathy  Polycythemia (HCC)  Elevated liver enzymes  Lactic acidosis  Leukocytosis Encephalopathy       Precautions: Fall  Chart, physical therapy assessment, plan of care and goals were reviewed. ASSESSMENT:  Consulted w PT regarding PT POC. Pt. not participating with PT. Screams with any mobility ie., rolling R<>L for hygiene, skin checks and absolutely refusing to attempt sitting EOB. Applied Prevalon for pressure relief and educated nurse switch prevalon Q2 HR. Question pain mgt as reports pain 20 today as opposed to 100 - 200 yesterday. States she \"can't feel anything\" and that is why she is unable to attempt mobility. Demos some limited B UE movt. Spoke with care mgt and plan is for pt to transition to ICF. Pt now with abraham as was retaining urine. Attempted education on importance of moving to facilitate bowel/bladder fxn. Skin currently intact. EDUCATION: unable to participate in education sec mentation. Progression toward goals:         Not making progress toward goals and plan of care will be adjusted. Recommend DC PT       PLAN:  Patient continues to benefit from skilled intervention to address the above impairments. Continue treatment per established plan of care.   Discharge Recommendations:  ICF  Further Equipment Recommendations for Discharge: unable to determine SUBJECTIVE:   Patient stated .      OBJECTIVE DATA SUMMARY:   Critical Behavior:  Neurologic State: Alert  Orientation Level: Oriented X4  Cognition: Follows commands  Safety/Judgement: Awareness of environment     G CODE:na  Functional Mobility Training: dependent mobility  Bed Mobility:  Rolling: Maximum assistance; Total assistance; Additional time;Assist x1;Assist x2 & screaming. Balance:  Sitting:  (UNABLE TO ASSESS: SCREAMING/REFUSING)  Therapeutic Exercises:   PROM B LE's for hip and knee F/E and abd/add WNL x 10  Vital Signs  Temp: 98.6 °F (37 °C)     Pulse (Heart Rate): 87     BP: 126/77     Resp Rate: 20     O2 Sat (%): 99 %  Pain:  Pre treatment pain level: 20  Post treatment pain level:   Activity Tolerance:   poor     After treatment:   Patient left in no apparent distress in bed  Call bell left within reach  Nursing notified & applied Mepilerobson Gonzáles PTA   Time Calculation: 12 mins

## 2017-05-03 NOTE — PROGRESS NOTES
Internal Medicine Progress Note    Patient's Name: Fadi Guzman Date: 4/26/2017  Length of Stay: 5      Assessment/Plan     #UTI  #Transaminitis Likely 2/2 Fatty Liver  #COPD  #Dysphagia  #Depression w/ Possible Component of Psychosis  #Somatic Delusions  #Anxiety Disoder  #Seasonal Allergies  #Acute Metabolic Encephalopathy, resolved  #AICHA, resolved  #Hypokalemia, resolved  #Lactic Acidosis, resolved  #DVT PPx    - Completed full course of antibx. Observe off antibx  - Cont celexa, seroquel, ativan PRN. Appreciate psych.  Pt not approved by [employer] service board for inpt psych  - Lovenox    Pt requires SNF per PT and level 2 screening, 7-10 business days    Subjective     Pt s/e @ bedside  No major events overnight  Cont to c/o sand in saliva requiring suction  She was breathing normally when watching her from outside the room, but as soon as I entered, she became tachypneic and denied changing her breathing pattern  Denies CP or SOB    Objective     Visit Vitals    /87 (BP 1 Location: Left arm, BP Patient Position: At rest;Head of bed elevated (Comment degrees))    Pulse 100    Temp 98.8 °F (37.1 °C)    Resp 16    Ht 5' 4\" (1.626 m)    Wt 68.6 kg (151 lb 3.2 oz)    SpO2 95%    BMI 25.95 kg/m2       Physical Exam:  General Appearance: NAD, conversant  Lungs: CTA with normal respiratory effort  CV: RRR, no m/r/g  Abdomen: soft, non-tender, normal bowel sounds  Extremities: no cyanosis, no peripheral edema  Psych: odd affect, alert and oriented to person, place and time    Lab/Data Reviewed:  BMP:   Lab Results   Component Value Date/Time     (L) 05/02/2017 05:00 AM    K 4.1 05/02/2017 05:00 AM     05/02/2017 05:00 AM    CO2 21 05/02/2017 05:00 AM    AGAP 12 05/02/2017 05:00 AM    GLU 83 05/02/2017 05:00 AM    BUN 9 05/02/2017 05:00 AM    CREA 0.35 (L) 05/02/2017 05:00 AM    GFRAA >60 05/02/2017 05:00 AM    GFRNA >60 05/02/2017 05:00 AM     CBC:   Lab Results   Component Value Date/Time    WBC 5.8 05/02/2017 05:00 AM    HGB 12.2 05/02/2017 05:00 AM    HCT 34.8 (L) 05/02/2017 05:00 AM     05/02/2017 05:00 AM       Imaging Reviewed:  No results found.     Medications Reviewed:  Current Facility-Administered Medications   Medication Dose Route Frequency    acetaminophen (TYLENOL) tablet 650 mg  650 mg Oral Q4H PRN    scopolamine (TRANSDERM-SCOP) 1.5 mg  1.5 mg TransDERmal Q72H    QUEtiapine (SEROquel) tablet 50 mg  50 mg Oral QHS    citalopram (CELEXA) tablet 20 mg  20 mg Oral DAILY    benzocaine-menthol (CEPACOL) lozenge 1 Lozenge  1 Lozenge Mucous Membrane PRN    enoxaparin (LOVENOX) injection 40 mg  40 mg SubCUTAneous Q24H    ondansetron (ZOFRAN) injection 4 mg  4 mg IntraVENous Q4H PRN    traMADol (ULTRAM) tablet 50 mg  50 mg Oral Q6H PRN    LORazepam (ATIVAN) tablet 1 mg  1 mg Oral Q6H PRN    oxymetazoline (AFRIN) 0.05 % nasal spray 2 Spray  2 Spray Both Nostrils BID PRN    sodium chloride (NS) flush 5-10 mL  5-10 mL IntraVENous PRN           Efrem Peterson DO  Internal Medicine, Hospitalist  Pager: [phone]  [address] [place] [address] Drive [employer]

## 2017-05-03 NOTE — PROGRESS NOTES
Internal Medicine Progress Note    Patient's Name: Rosa Kebede Date: 4/26/2017  Length of Stay: 6      Assessment/Plan     #UTI  #Transaminitis Likely 2/2 Fatty Liver  #COPD  #Dysphagia  #Depression w/ Possible Component of Psychosis  #Somatic Delusions  #Anxiety Disoder  #Seasonal Allergies  #Acute Metabolic Encephalopathy, resolved  #AICHA, resolved  #Hypokalemia, resolved  #Lactic Acidosis, resolved  #DVT PPx    - Completed full course of antibx. Observe off antibx  - Cont celexa, seroquel, ativan PRN. Appreciate psych. Pt not approved by [employer] for [employer]. Awaiting repeat psych consult for definitive psych diagnosis  - Lovenox    Pt requires SNF per PT and level 2 screening, 7-10 business days    Subjective     Pt s/e @ bedside  No major events overnight  Pt cont to show signs of paranoia, thinking sand in her saliva  She says she cant move her arms at all  Denies CP or SOB    Objective     Visit Vitals    /86    Pulse 100    Temp 98.4 °F (36.9 °C)    Resp 18    Ht 5' 4\" (1.626 m)    Wt 68.6 kg (151 lb 3.2 oz)    SpO2 98%    BMI 25.95 kg/m2       Physical Exam:  General Appearance: NAD, conversant  Lungs: CTA with normal respiratory effort  CV: RRR, no m/r/g  Abdomen: soft, non-tender, normal bowel sounds    Lab/Data Reviewed:  BMP:   No results found for: NA, K, CL, CO2, AGAP, GLU, BUN, CREA, GFRAA, GFRNA  CBC:   No results found for: WBC, HGB, HGBEXT, HCT, HCTEXT, PLT, PLTEXT, HGBEXT, HCTEXT, PLTEXT    Imaging Reviewed:  No results found.     Medications Reviewed:  Current Facility-Administered Medications   Medication Dose Route Frequency    acetaminophen (TYLENOL) tablet 650 mg  650 mg Oral Q4H PRN    scopolamine (TRANSDERM-SCOP) 1.5 mg  1.5 mg TransDERmal Q72H    QUEtiapine (SEROquel) tablet 50 mg  50 mg Oral QHS    citalopram (CELEXA) tablet 20 mg  20 mg Oral DAILY    benzocaine-menthol (CEPACOL) lozenge 1 Lozenge  1 Lozenge Mucous Membrane PRN    enoxaparin (LOVENOX) injection 40 mg  40 mg SubCUTAneous Q24H    ondansetron (ZOFRAN) injection 4 mg  4 mg IntraVENous Q4H PRN    traMADol (ULTRAM) tablet 50 mg  50 mg Oral Q6H PRN    LORazepam (ATIVAN) tablet 1 mg  1 mg Oral Q6H PRN    oxymetazoline (AFRIN) 0.05 % nasal spray 2 Spray  2 Spray Both Nostrils BID PRN    sodium chloride (NS) flush 5-10 mL  5-10 mL IntraVENous PRN           Felice Duran DO  Internal Medicine, Hospitalist  Pager: [phone]  [address] [place] [address] Drive [employer]

## 2017-05-04 ENCOUNTER — APPOINTMENT (OUTPATIENT)
Dept: MRI IMAGING | Age: 57
DRG: 049 | End: 2017-05-04
Attending: PSYCHIATRY & NEUROLOGY
Payer: MEDICAID

## 2017-05-04 LAB
APPEARANCE UR: CLEAR
BACTERIA URNS QL MICRO: ABNORMAL /HPF
BILIRUB UR QL: ABNORMAL
COLOR UR: ABNORMAL
EPITH CASTS URNS QL MICRO: ABNORMAL /LPF (ref 0–5)
EST. AVERAGE GLUCOSE BLD GHB EST-MCNC: 123 MG/DL
GLUCOSE UR STRIP.AUTO-MCNC: 100 MG/DL
HBA1C MFR BLD: 5.9 % (ref 4.2–5.6)
HGB UR QL STRIP: NEGATIVE
KETONES UR QL STRIP.AUTO: >160 MG/DL
LEUKOCYTE ESTERASE UR QL STRIP.AUTO: ABNORMAL
NITRITE UR QL STRIP.AUTO: POSITIVE
PH UR STRIP: 6 [PH] (ref 5–8)
PROT UR STRIP-MCNC: ABNORMAL MG/DL
RBC #/AREA URNS HPF: 0 /HPF (ref 0–5)
SP GR UR REFRACTOMETRY: >1.03 (ref 1–1.03)
UROBILINOGEN UR QL STRIP.AUTO: 1 EU/DL (ref 0.2–1)
WBC URNS QL MICRO: ABNORMAL /HPF (ref 0–4)
YEAST URNS QL MICRO: ABNORMAL

## 2017-05-04 PROCEDURE — A9585 GADOBUTROL INJECTION: HCPCS | Performed by: HOSPITALIST

## 2017-05-04 PROCEDURE — 36415 COLL VENOUS BLD VENIPUNCTURE: CPT | Performed by: PSYCHIATRY & NEUROLOGY

## 2017-05-04 PROCEDURE — 86617 LYME DISEASE ANTIBODY: CPT | Performed by: PSYCHIATRY & NEUROLOGY

## 2017-05-04 PROCEDURE — 74011250636 HC RX REV CODE- 250/636: Performed by: HOSPITALIST

## 2017-05-04 PROCEDURE — 83036 HEMOGLOBIN GLYCOSYLATED A1C: CPT | Performed by: PSYCHIATRY & NEUROLOGY

## 2017-05-04 PROCEDURE — 84207 ASSAY OF VITAMIN B-6: CPT | Performed by: PSYCHIATRY & NEUROLOGY

## 2017-05-04 PROCEDURE — 82784 ASSAY IGA/IGD/IGG/IGM EACH: CPT | Performed by: PSYCHIATRY & NEUROLOGY

## 2017-05-04 PROCEDURE — 51798 US URINE CAPACITY MEASURE: CPT

## 2017-05-04 PROCEDURE — 81001 URINALYSIS AUTO W/SCOPE: CPT | Performed by: HOSPITALIST

## 2017-05-04 PROCEDURE — 65660000000 HC RM CCU STEPDOWN

## 2017-05-04 PROCEDURE — 86618 LYME DISEASE ANTIBODY: CPT | Performed by: PSYCHIATRY & NEUROLOGY

## 2017-05-04 PROCEDURE — 83921 ORGANIC ACID SINGLE QUANT: CPT | Performed by: PSYCHIATRY & NEUROLOGY

## 2017-05-04 PROCEDURE — 74011250637 HC RX REV CODE- 250/637: Performed by: HOSPITALIST

## 2017-05-04 PROCEDURE — 74011250637 HC RX REV CODE- 250/637: Performed by: FAMILY MEDICINE

## 2017-05-04 PROCEDURE — 72156 MRI NECK SPINE W/O & W/DYE: CPT

## 2017-05-04 PROCEDURE — 82784 ASSAY IGA/IGD/IGG/IGM EACH: CPT

## 2017-05-04 PROCEDURE — 84425 ASSAY OF VITAMIN B-1: CPT | Performed by: PSYCHIATRY & NEUROLOGY

## 2017-05-04 PROCEDURE — 82607 VITAMIN B-12: CPT | Performed by: PSYCHIATRY & NEUROLOGY

## 2017-05-04 PROCEDURE — 74011250637 HC RX REV CODE- 250/637: Performed by: INTERNAL MEDICINE

## 2017-05-04 RX ORDER — OXYCODONE HYDROCHLORIDE 5 MG/1
5 TABLET ORAL
Status: DISCONTINUED | OUTPATIENT
Start: 2017-05-04 | End: 2017-05-05

## 2017-05-04 RX ADMIN — CITALOPRAM HYDROBROMIDE 20 MG: 20 TABLET ORAL at 10:47

## 2017-05-04 RX ADMIN — OXYCODONE HYDROCHLORIDE 5 MG: 5 TABLET ORAL at 14:00

## 2017-05-04 RX ADMIN — TRAMADOL HYDROCHLORIDE 50 MG: 50 TABLET, FILM COATED ORAL at 10:45

## 2017-05-04 RX ADMIN — OXYCODONE HYDROCHLORIDE 5 MG: 5 TABLET ORAL at 18:35

## 2017-05-04 RX ADMIN — OXYCODONE HYDROCHLORIDE 5 MG: 5 TABLET ORAL at 23:14

## 2017-05-04 RX ADMIN — QUETIAPINE FUMARATE 50 MG: 25 TABLET, FILM COATED ORAL at 22:14

## 2017-05-04 RX ADMIN — GADOBUTROL 7.5 ML: 604.72 INJECTION INTRAVENOUS at 19:48

## 2017-05-04 NOTE — PROGRESS NOTES
Respiratory Therapy Note:  Equipment to do FVC is going to be in service on 5/5/17. Will begin measurements as soon as available.

## 2017-05-04 NOTE — PROGRESS NOTES
Staff after rounding asked me to check on patient because of some of the thing patient like she\" needs help eating\" said to her. Patient seemed to be tired but was willing to talk. Her breakfast tray was still on the table with a note \"save\". Patient said, \" I don't want it\". Patient shared that she is loosing feelings in her hands, and that she is getting worst since she has been at the hospital.       I shared this concern with Gabriela Gabriel. She is aware of the patient.     [employer]   Board Certified   [phone] - Office

## 2017-05-04 NOTE — CONSULTS
Chief Complaint: Telepsychiatry follow up     History of Present Illness: This pt is a 60 yr old female with unclear psychiatric history. She came to the hospital on 4/26, brought in by police for FTT, [address]. Apparently she was not eating or drinking for 5 days. She was found to have a UTI and with potassium of 1.9. She was admitted to medicine. During the hospital stay there has been concern that she is depressed, possibly psychotic - as the pt has hx of psychiatric hospitalization in Oct 2016 for a suicide attempt. Also during this hospitalization she may have some bizarre thoughts about her saliva containing sand.      Pt was seen by telepsychiatry on 4/28 and then on 4/30. During those assessments dx was unclear, collateral was unable to be obtained. Recommendation was made to the pt to be screened for commitment and she was deemed not to meet criteria for commitment. She was continued on Celexa and seroquel was started.       Apparently the pt continues to have inconsistent findings on exam. Telepsychiatry was consulted for follow up. Upon interview pt says that that she is getting worse - she says that her 'hands are getting paralyzed', that she is losing her hearing . She continues to say that she does not think that her sx are psychiatric in nature, that they are medical. She says that she would like to see a neurologist for her sx. There is plan for her to be seen by a neurologist.             Collateral: unable to be obtained , boyfriend not present during interview         :   Mental Status Exam:   Appearance and attire: pt is Lying in bed, disheveled   Attitude and behavior: mostly cooperative  Speech: clear, coherent, short phrases   Affect and mood: blunted  Association and thought processes: goal directed   Thought content: preoccupations with her medical sx Denies SI   Perception: pt does not appear to be responding to internal stimuli.    Sensorium, memory, and orientation: alert  Intellectual functioning: unable to assess  Insight and judgment: impaired         Diagnosis:   Dx unclear; consider psychosis, consider conversion disorder, consider depression     Treatment Recommendations:   Pt's dx is still unclear. Psychosis, conversion disorder, depression are all a possibility. Pt continues to report that her sx are medical in nature and not psychiatric, though per the team there are inconsistencies in her physical exam. Recommend continued medical workup to rule out medical causes. In the end, a dx like conversion disorder or a dx like somatic delusions - these are diagnoses of exclusion that are best made after all medical causes are ruled out. These dx are also best confirmed with the help collateral information (which has not been done as boyfriend is unavailable during interview). Sometimes when the dx is not fully clear , multiple evaluations in the setting of outpt therapy (vs medication management) are required to determine if there is any underlying psychological component contributing to a pt's physical sx. Thus for now, our recommendations /treatment options are limited on the inpt medical setting, especially because consult with a psychology service/psychological testing is not available. I do recommend that a  assist the pt in [address] [place] [address] outpt psychiatric care and outpt therapy.  Recommend that her medications of celexa and seroquel can be continued for now.               Artemio Gomez MD  Telepsychiatry

## 2017-05-04 NOTE — PROGRESS NOTES
Late entry for 5/3: Spoke with Emma Harden @ [facility] re level 2, states will need new psych consult with definitive diagnosis before proceeding with level 2. Spoke with Dr. Cora Cuevas & informed him of above. Janette Howard RN,ext. [phone].

## 2017-05-04 NOTE — PROGRESS NOTES
Per discussion with  PTA will discontinue PT services at this time until patient can participate with therapy.   Thank

## 2017-05-04 NOTE — PROGRESS NOTES
Patient is unable to communicate at this time.  offered prayer and left Spiritual Care brochure. Chaplains will continue to follow and will provide pastoral care on an as needed/requested basis.     [address] [facility]   Staff [address] [facility]   [phone]

## 2017-05-04 NOTE — PROGRESS NOTES
Internal Medicine Progress Note    Patient's Name: Kayli Talley Date: 4/26/2017  Length of Stay: 7      Assessment/Plan     #UTI  #Transaminitis Likely 2/2 Fatty Liver  #COPD  #Dysphagia  #Depression w/ Possible Component of Psychosis  #Somatic Delusions  #Anxiety Disoder  #Seasonal Allergies  #Acute Metabolic Encephalopathy, resolved  #AICHA, resolved  #Hypokalemia, resolved  #Lactic Acidosis, resolved  #DVT PPx    - Completed full course of antibx. Observe off antibx  - Cont celexa, seroquel, ativan PRN. Appreciate psych. Pt not approved by [employer] for [employer]. Awaiting repeat psych consult for definitive psych diagnosis. The patient continues to show conflicting physical exam signs. She began breathing fast again when I walked in, but was breathing normally when I viewed her from the doorway. Some of the findings during my exam were very inconsistent. She states she is unable to grab her drink because of weakness, yet when I had her focused on another subject and asked her to squeeze my hand she had full 5/5 strength. When I told her that her strength was fine on hand , she proceeded to have me squeeze again, at which point she was unable to squeeze and said, \"you call that good strength? \" She states she is unable to move her arms and no control over them. When I had raise her arm, she was flaccid. But when I held her arm over her head and dropped it, she avoided letting her arm land in her face. She also clearly moves both her arms during conversation. She is now requesting to see a neurologist. She has nothing at this point on labs or imaging that would suggest an underlying issue.  I currently have no medical explanation for her issues and believe them to by psychiatric, but will consult neurology for further help in ruling out a neurological component causing her problems.  - Lovenox    Pt requires SNF per PT and level 2 screening, 7-10 business days    Subjective     Pt s/e @ bedside  No major events overnight  She complains she cant move anything  Denies CP or SOB    Objective     Visit Vitals    /87    Pulse 99    Temp 98.4 °F (36.9 °C)    Resp 19    Ht 5' 4\" (1.626 m)    Wt 68.6 kg (151 lb 3.2 oz)    SpO2 99%    BMI 25.95 kg/m2       Physical Exam:  General Appearance: NAD, conversant  Neck: No JVD, supple  Lungs: CTA with normal respiratory effort  CV: RRR, no m/r/g  Abdomen: soft, non-tender, normal bowel sounds  Psych: Anxious, odd affect    Lab/Data Reviewed:  BMP:   No results found for: NA, K, CL, CO2, AGAP, GLU, BUN, CREA, GFRAA, GFRNA  CBC:   No results found for: WBC, HGB, HGBEXT, HCT, HCTEXT, PLT, PLTEXT, HGBEXT, HCTEXT, PLTEXT    Imaging Reviewed:  No results found.     Medications Reviewed:  Current Facility-Administered Medications   Medication Dose Route Frequency    acetaminophen (TYLENOL) tablet 650 mg  650 mg Oral Q4H PRN    scopolamine (TRANSDERM-SCOP) 1.5 mg  1.5 mg TransDERmal Q72H    QUEtiapine (SEROquel) tablet 50 mg  50 mg Oral QHS    citalopram (CELEXA) tablet 20 mg  20 mg Oral DAILY    benzocaine-menthol (CEPACOL) lozenge 1 Lozenge  1 Lozenge Mucous Membrane PRN    enoxaparin (LOVENOX) injection 40 mg  40 mg SubCUTAneous Q24H    ondansetron (ZOFRAN) injection 4 mg  4 mg IntraVENous Q4H PRN    traMADol (ULTRAM) tablet 50 mg  50 mg Oral Q6H PRN    LORazepam (ATIVAN) tablet 1 mg  1 mg Oral Q6H PRN    oxymetazoline (AFRIN) 0.05 % nasal spray 2 Spray  2 Spray Both Nostrils BID PRN    sodium chloride (NS) flush 5-10 mL  5-10 mL IntraVENous PRN           Brayan November, DO  Internal Medicine, Hospitalist  Pager: [phone]  [address] [place] [address]

## 2017-05-04 NOTE — CONSULTS
Neurology Consult Note      Patient: Warner Zacarias MRN: [id number]  SSN: [id number]    YOB: 1960  Age: 60 y.o. Sex: female      Subjective:      Warner Zacarias is a 60 y.o. female who is being seen for weakness in extremities. She stated that 2 weeks prior to presentation she started experiencing progressive weakness in her lower extremities so was unable to take are of her needs and was not eating well so finally brought to ER 4/27 but apparently with police assistance since did not want to come but her boyfriend insisted. Been treated for UTI then since this admission the weakness spread to her upper extremities. There are associated peripheral parethesia and pain in her extremities. No neck pain. There is some swallowing difficulties. No facial weakness,ptosis or diplopia. Has been hyperventilating at times but Pulse OX was normal per RN. Some psychiatric issues so psychiatry on her case. Last couple days she is not hearing well .       Past Medical History:   Diagnosis Date    Allergic rhinitis     Angina pectoris (HCC)     Anxiety     Bronchitis     Chronic fatigue syndrome     Chronic pain     COPD (chronic obstructive pulmonary disease) (HCC)     Generalized pain     Headache, tension-type     HTN (hypertension)     Hyperlipidemia     Hypokalemia     Insomnia     Leg swelling     Lyme disease     Nicotine dependence     OA (osteoarthritis)     RA (rheumatoid arthritis) (HCC)     Vertigo     Vitamin D deficiency      Past Surgical History:   Procedure Laterality Date    HX BUNIONECTOMY  1991    left foot      Family History   Problem Relation Age of Onset    Colon Cancer Other     Cancer Other      lung     Social History   Substance Use Topics    Smoking status: Current Every Day Smoker     Packs/day: 0.50     Years: 30.00    Smokeless tobacco: Never Used    Alcohol use No      Current Facility-Administered Medications   Medication Dose Route Frequency Provider Last Rate Last Dose    oxyCODONE IR (ROXICODONE) tablet 5 mg  5 mg Oral Q4H PRN Noni Scotty, DO   5 mg at 05/04/17 1835    [START ON 5/5/2017] thiamine (B-1) 100 mg in 0.9% sodium chloride 50 mL IVPB  100 mg IntraVENous Q24H Roberto Best MD        acetaminophen (TYLENOL) tablet 650 mg  650 mg Oral Q4H PRN Milka Lopez MD        scopolamine (TRANSDERM-SCOP) 1.5 mg  1.5 mg TransDERmal Q72H Dossie Lei, DO   1.5 mg at 05/03/17 1541    QUEtiapine (SEROquel) tablet 50 mg  50 mg Oral QHS Dossie Lei, DO   50 mg at 05/03/17 2153    citalopram (CELEXA) tablet 20 mg  20 mg Oral DAILY Dossie Lei, DO   20 mg at 05/04/17 1047    benzocaine-menthol (CEPACOL) lozenge 1 Lozenge  1 Lozenge Mucous Membrane PRN Dossie Lei, DO   1 Lozenge at 04/28/17 1659    enoxaparin (LOVENOX) injection 40 mg  40 mg SubCUTAneous Q24H Viktoriya Ayala MD   40 mg at 05/03/17 0601    ondansetron (ZOFRAN) injection 4 mg  4 mg IntraVENous Q4H PRN Tigre Hill MD   4 mg at 04/30/17 1159    LORazepam (ATIVAN) tablet 1 mg  1 mg Oral Q6H PRN Dossie Lei, DO   1 mg at 05/03/17 1541    oxymetazoline (AFRIN) 0.05 % nasal spray 2 Spray  2 Spray Both Nostrils BID PRN Dossie Lei, DO        sodium chloride (NS) flush 5-10 mL  5-10 mL IntraVENous PRN Zina Cunha MD            Allergies   Allergen Reactions    Aspirin Other (comments)     Stomach cramps    Ibuprofen Not Reported This Time    Levaquin [Levofloxacin] Not Reported This Time           Mobic [Meloxicam] Not Reported This Time    Pcn [Penicillins] Not Reported This Time    Plaquenil [Hydroxychloroquine] Not Reported This Time       Labs:  Labs were reviewed. MRI Results (most recent): I personally reviewed her MRI. Results from [place] encounter on 04/26/17   MRI BRAIN WO CONT     Impression IMPRESSION:      1.   Atrophy and mild to moderate chronic small vessel changes with asymmetric  volume loss in the left cerebral hemisphere which is of indeterminate etiology  and significance. 2.  Otherwise, unremarkable evaluation. Specifically, no acute intracranial  abnormalities are identified. Review of Systems:    Y  N   Constitutional: [x] [] recent weight change  [] [] fever  [] [] sleep difficulties   ENT/Mouth:  [x] [] hearing loss  [] [] swallowing problems  [] [] slurred speech   Cardiovascular:  [] [x] chest pain   [] [] palpitations    Respiratory: [x] [] cough with swallow  [] [] shortness of breath  [] [] sleep apnea  [] [] intubated   Gastrointestinal: [] [x] abdominal pain  [] [] nausea   Genitourinary: [] [] frequent urination  [x] [] incontinence    Musculoskeletal:   [] [] joint pain  [x] [] muscle pain   Integument:   [] [] rash/itching   Neurological:  [] [] dizziness/vertigo  [] [] sedation  [] [] confusion  [] [] agitation/combativeness  [] [x] loss of consciousness  [] [] numbness/tingling sensation  [] [] tremors  [x] [] weakness in limbs  [x] [] difficulty with balance  [] [] frequent or recurring headaches  [] [] memory loss   [] [] comatose  [] [] seizures   Psychiatric:   [] [] depression  [x] [] hallucinations   Endocrine: [] [x] excessive thirst or urination   [] [] heat or cold intolerance   Hematologic/Lymphatic: [] [x] bleeding tendency  [] [] enlarged lymph nodes         Objective:     Vitals:    05/04/17 0219 05/04/17 0549 05/04/17 1015 05/04/17 1754   BP: 122/75 136/87 123/87 126/89   Pulse: 63 95 99 (!) 56   Resp: 18 18 19 20   Temp: 97.8 °F (36.6 °C) 97.6 °F (36.4 °C) 98.4 °F (36.9 °C) 98.3 °F (36.8 °C)   SpO2: 100% 100% 99% 91%   Weight:       Height:            Physical Exam:  {Neurological examination:   Mental status examination: Alert and oriented times three. Normal speech, and language. Cranial nerves:   Optic nerve: vision intact bilaterally, peripheral vision is normal to confrontation.    Oculomotor, Trochlear and abducens nerves: papillary response to light is brisk, primary gaze is normal, eye movements within normal limits, no ptosis, no pathologic nystagmus present. Trigeminal nerve: facial sensation normal bilaterally, normal movements of the jaw. Normal corneal reflexes. Facial nerve: No facial weakness was present. Vestibuloacoustic nerve: hearing is normal bilaterally. Glossopharyngeal and Vagus nerves: Soft palate elevation is normal. Gag reflex is normal.   Spinal accessory nerve: shoulder shrug and head turn strength is normal.   Hypoglossal nerve: tongue movements normal.   Motor exam: Reasonable Jaw opening . Some Neck flexion weakness ( not good effort) and better neck extension. Strength in the upper: Deltoid 2/3 biceps and triceps 3/4.  and fingers abduction 3/5. In lower extremities: hip flexion 1/5, knees extension and flexion 2/5 and 2-3/5 plantarflexion and dorsiflexion . Muscle tone was normal.    Sensory: Decrease pinprick, light touch in gloves and stoking distribution and severe decrease vibration sensation. Deep tendon reflexes:Areflexic. No Babinski responses. Gait: Deferred.   Assessment/Plan:     Hospital Problems  Date Reviewed: 1/18/2016          Codes Class Noted POA    Dehydration ICD-10-CM: E86.0  ICD-9-CM: 276.51  4/27/2017 Unknown        Hypokalemia ICD-10-CM: E87.6  ICD-9-CM: 276.8  4/27/2017 Unknown        Lactic acidosis ICD-10-CM: E87.2  ICD-9-CM: 276.2  4/27/2017 Unknown        [facility] ([facility] ICD-10-CM: D75.1  ICD-9-CM: 238.4  4/27/2017 Unknown        Leukocytosis ICD-10-CM: P64.631  ICD-9-CM: 288.60  4/27/2017 Unknown        UTI (urinary tract infection) ICD-10-CM: N39.0  ICD-9-CM: 599.0  4/27/2017 Unknown        * (Principal)Encephalopathy ICD-10-CM: G93.40  ICD-9-CM: 348.30  4/27/2017 Unknown        Elevated liver enzymes ICD-10-CM: R74.8  ICD-9-CM: 790.5  4/27/2017 Unknown              Although she is poor historian and there are psychiatric issues going on ; what I gathered from her that 2 weeks prior to presentation she started experiencing progressive weakness in her lower extremities then since this admission the weakness spread to her upper extremities. There are associated peripheral parethesia and pain in her extremities. There is some swallowing difficulties. No facial weakness. Exam with sings of severe polyneuropathy with Areflexia and significant muscle weakness in LE > UE. She needs worked for acute/subacute polyneuropathy process and I think an acute demyelinating polyneuropathy ( Guillain Quilcene syndrome) is in the differential diagnosis. So will send neuropathy workup today and will check MRI cerivical spine today to rule out any mimicking cervical cord process. Will arrange lumbar puncture for the morning and will plan to obtain a nerve conduction study later tomorrow. Will check on her Lyme status in serum and CSF. Will Thiamine 100 mg IV every day for few days . Awaiting level to return. Intersting finding on her nuvia MRI with asymmetric left hemispheric atrophy which raises the possibility of some cerebral degenerative disease but unrelated to her current situation. .          Signed By: Rogers Carmona MD     May 4, 2017

## 2017-05-05 ENCOUNTER — APPOINTMENT (OUTPATIENT)
Dept: GENERAL RADIOLOGY | Age: 57
DRG: 049 | End: 2017-05-05
Attending: PSYCHIATRY & NEUROLOGY
Payer: MEDICAID

## 2017-05-05 PROBLEM — G61.0: Status: ACTIVE | Noted: 2017-05-05

## 2017-05-05 LAB
CHARACTER SMN: CLEAR
COLOR SPUN CSF: COLORLESS
FOLATE SERPL-MCNC: 2.4 NG/ML (ref 3.1–17.5)
GLUCOSE CSF-MCNC: 59 MG/DL (ref 40–70)
Lab: NORMAL
PROT CSF-MCNC: 138 MG/DL (ref 15–45)
RBC # CSF: 11 /CU MM
TEST DESCRIPTION:,ATST: NORMAL
TUBE # CSF: 1
TUBE # CSF: 1
TUBE # CSF: 3
VIT B12 SERPL-MCNC: 371 PG/ML (ref 211–911)
WBC # CSF: 0 /CU MM

## 2017-05-05 PROCEDURE — 74011000258 HC RX REV CODE- 258: Performed by: PSYCHIATRY & NEUROLOGY

## 2017-05-05 PROCEDURE — 77030032490 HC SLV COMPR SCD KNE COVD -B

## 2017-05-05 PROCEDURE — 74011250637 HC RX REV CODE- 250/637: Performed by: INTERNAL MEDICINE

## 2017-05-05 PROCEDURE — 74011250636 HC RX REV CODE- 250/636: Performed by: FAMILY MEDICINE

## 2017-05-05 PROCEDURE — 65660000001 HC RM ICU INTERMED STEPDOWN

## 2017-05-05 PROCEDURE — 82945 GLUCOSE OTHER FLUID: CPT | Performed by: PSYCHIATRY & NEUROLOGY

## 2017-05-05 PROCEDURE — 74011000250 HC RX REV CODE- 250: Performed by: HOSPITALIST

## 2017-05-05 PROCEDURE — 86788 WEST NILE VIRUS AB IGM: CPT | Performed by: PSYCHIATRY & NEUROLOGY

## 2017-05-05 PROCEDURE — 36415 COLL VENOUS BLD VENIPUNCTURE: CPT | Performed by: PSYCHIATRY & NEUROLOGY

## 2017-05-05 PROCEDURE — 87070 CULTURE OTHR SPECIMN AEROBIC: CPT | Performed by: PSYCHIATRY & NEUROLOGY

## 2017-05-05 PROCEDURE — 84110 ASSAY OF PORPHOBILINOGEN: CPT | Performed by: PSYCHIATRY & NEUROLOGY

## 2017-05-05 PROCEDURE — 84157 ASSAY OF PROTEIN OTHER: CPT | Performed by: PSYCHIATRY & NEUROLOGY

## 2017-05-05 PROCEDURE — 88108 CYTOPATH CONCENTRATE TECH: CPT | Performed by: PSYCHIATRY & NEUROLOGY

## 2017-05-05 PROCEDURE — 86617 LYME DISEASE ANTIBODY: CPT | Performed by: PSYCHIATRY & NEUROLOGY

## 2017-05-05 PROCEDURE — 77030020256 HC SOL INJ NACL 0.9%  500ML

## 2017-05-05 PROCEDURE — 74011250637 HC RX REV CODE- 250/637: Performed by: HOSPITALIST

## 2017-05-05 PROCEDURE — 82175 ASSAY OF ARSENIC: CPT | Performed by: PSYCHIATRY & NEUROLOGY

## 2017-05-05 PROCEDURE — 89050 BODY FLUID CELL COUNT: CPT | Performed by: PSYCHIATRY & NEUROLOGY

## 2017-05-05 PROCEDURE — 77030014143 XR SPINAL PUNC LUMB DX

## 2017-05-05 PROCEDURE — 74011250636 HC RX REV CODE- 250/636: Performed by: PSYCHIATRY & NEUROLOGY

## 2017-05-05 PROCEDURE — 009U3ZX DRAINAGE OF SPINAL CANAL, PERCUTANEOUS APPROACH, DIAGNOSTIC: ICD-10-PCS | Performed by: RADIOLOGY

## 2017-05-05 RX ORDER — OXYCODONE HYDROCHLORIDE 5 MG/1
10 TABLET ORAL
Status: DISCONTINUED | OUTPATIENT
Start: 2017-05-05 | End: 2017-05-19 | Stop reason: HOSPADM

## 2017-05-05 RX ORDER — LIDOCAINE HYDROCHLORIDE 10 MG/ML
1-5 INJECTION, SOLUTION EPIDURAL; INFILTRATION; INTRACAUDAL; PERINEURAL
Status: COMPLETED | OUTPATIENT
Start: 2017-05-05 | End: 2017-05-05

## 2017-05-05 RX ORDER — FOLIC ACID 1 MG/1
1 TABLET ORAL DAILY
Status: DISCONTINUED | OUTPATIENT
Start: 2017-05-06 | End: 2017-05-19 | Stop reason: HOSPADM

## 2017-05-05 RX ORDER — LIDOCAINE HYDROCHLORIDE 10 MG/ML
1-5 INJECTION INFILTRATION; PERINEURAL
Status: COMPLETED | OUTPATIENT
Start: 2017-05-05 | End: 2017-05-05

## 2017-05-05 RX ADMIN — IMMUNE GLOBULIN INTRAVENOUS (HUMAN) 20 G: 5 INJECTION, SOLUTION INTRAVENOUS at 22:59

## 2017-05-05 RX ADMIN — LIDOCAINE HYDROCHLORIDE 1 ML: 10 INJECTION, SOLUTION INFILTRATION; PERINEURAL at 09:34

## 2017-05-05 RX ADMIN — CITALOPRAM HYDROBROMIDE 20 MG: 20 TABLET ORAL at 10:24

## 2017-05-05 RX ADMIN — OXYCODONE HYDROCHLORIDE 5 MG: 5 TABLET ORAL at 05:45

## 2017-05-05 RX ADMIN — OXYCODONE HYDROCHLORIDE 10 MG: 5 TABLET ORAL at 19:42

## 2017-05-05 RX ADMIN — QUETIAPINE FUMARATE 50 MG: 25 TABLET, FILM COATED ORAL at 22:58

## 2017-05-05 RX ADMIN — THIAMINE HYDROCHLORIDE 100 MG: 100 INJECTION, SOLUTION INTRAMUSCULAR; INTRAVENOUS at 16:17

## 2017-05-05 RX ADMIN — OXYCODONE HYDROCHLORIDE 10 MG: 5 TABLET ORAL at 14:11

## 2017-05-05 RX ADMIN — LIDOCAINE HYDROCHLORIDE 1 ML: 10 INJECTION, SOLUTION EPIDURAL; INFILTRATION; INTRACAUDAL; PERINEURAL at 09:34

## 2017-05-05 RX ADMIN — Medication 10 ML: at 05:44

## 2017-05-05 RX ADMIN — ENOXAPARIN SODIUM 40 MG: 40 INJECTION SUBCUTANEOUS at 14:57

## 2017-05-05 RX ADMIN — OXYCODONE HYDROCHLORIDE 10 MG: 5 TABLET ORAL at 10:23

## 2017-05-05 NOTE — CONSULTS
Infectious Disease Consultation Note    Will plan to check back on Monday 5/8/2017. Dr. Meggan Michael is on call for ID this weekend and can be  reached at [phone] if needed. Requested by: Dr. Akhil Yeboah    Reason: GBS    Current abx Prior abx   None Cefepime 4/28 - 0, Ceftriaxone 4/29 - 1      ASSESSMENT - > REC:     Brunetta Matte syndrome  - suspect precipitating factor was acute viral pharyngitis (7 days PTA) No recent flu-like illness, diarrheal episodes, no recent vaccinations, no recent travel  - currently no evidence of pharyngeal inflammation or any other active infection -> IVIG as planned per Neurology  -> no antibacterial therapy indicated at this time   Severe Hypokalemia on admission  - due to poor po intake  - corrected    Abnormal CSF  - elevated protein, normal glucose without leukorrhachia  - c/w GBS    Abnormal LFT's  - has diffuse hepatic steatosis on CT +/- dehydration  - transaminases, alk phos improving    HTN     COPD    OA/ RA    Hyperlipidemia    ? Chronic Fatigue Syndrome    h/o Lyme Disease  - dx'd ~ 2001 rx'd w/ 2 1-month courses doxycyline 2004 and 2006 and subsequently w/ multiple persistent complaints. multiple + IgG western blots and negative IgM western blots dating back to 2003 (per Dr. Ronal Darling ID, 2011: no further treatment recommended)      MICROBIOLOGY:   4/26 blcx NG x 2   urcx  NG   MRSA scrn neg  5/5 CSF cx IP    LINES AND CATHETERS:   piv      HPI:    60year-old  female w/ h/o of HTN, COPD, OA/ RA, Hyperlipidemia, chronic Fatigue Syndrome, h/o Lyme Disease admitted to [facility] 4/27/2017 due to altered mental status. She tells me she was at her usual state of fair health until around 1 week PTA she first developed a sore, scratchy throat. She said that this made it hard for her to swallow and caused her to vomit soon after ingesting food or drink. She did not take any food or drink for several days.  She took some \"lozenges\" and within two days the throat symptoms resolved. However on the day of admission ED records indicated she had developed altered mental status and was brought in by police. Unclear who called police (?boyfriend). Head CT was normal but potassium was 1.9, Transaminases and alkaline phosphatase were moderately elevated but bilirubin was normal.   CTAP 4/26 showed no acute or focal abnormality but diffuse hepatic steatosis , cholelithiasis and colonic diverticulosis without diverticulitis. She was admitted after one dose of Cefepime and started on Ceftriaxone 4/30 and 5/1. She has been off antibiotics since 5/1 but has developed progressing weakness of her lower and upper extremities. Neurology has been consulted and LP done showed CSF with 0 WBC and protein 138. Glucose was 59. She is felt to be having active Guillain-Gill Syndrome and is being transferred to the ICU. IVIG is planned. She denies any recent flu-like illness or diarrheal episodes. She has had no recent vaccinations or new medication. she has had no recent travel and denies mosquito or insect bites lately. Past Medical History:   Diagnosis Date    Allergic rhinitis     Angina pectoris ([facility])     Anxiety     Bronchitis     Chronic fatigue syndrome     Chronic pain     COPD (chronic obstructive pulmonary disease) (HCC)     Generalized pain     Headache, tension-type     HTN (hypertension)     Hyperlipidemia     Hypokalemia     Insomnia     Leg swelling     Lyme disease     Nicotine dependence     OA (osteoarthritis)     RA (rheumatoid arthritis) ([facility])     Vertigo     Vitamin D deficiency        Past Surgical History:   Procedure Laterality Date    HX BUNIONECTOMY  1991    left foot       Allergies: Aspirin; Ibuprofen; Levaquin [levofloxacin]; Mobic [meloxicam]; Pcn [penicillins]; and Plaquenil [hydroxychloroquine] .     Current Facility-Administered Medications   Medication Dose Route Frequency    oxyCODONE IR (ROXICODONE) tablet 10 mg  10 mg Oral Q4H PRN    thiamine (B-1) 100 mg in 0.9% sodium chloride 50 mL IVPB  100 mg IntraVENous Q24H    acetaminophen (TYLENOL) tablet 650 mg  650 mg Oral Q4H PRN    scopolamine (TRANSDERM-SCOP) 1.5 mg  1.5 mg TransDERmal Q72H    QUEtiapine (SEROquel) tablet 50 mg  50 mg Oral QHS    citalopram (CELEXA) tablet 20 mg  20 mg Oral DAILY    benzocaine-menthol (CEPACOL) lozenge 1 Lozenge  1 Lozenge Mucous Membrane PRN    enoxaparin (LOVENOX) injection 40 mg  40 mg SubCUTAneous Q24H    ondansetron (ZOFRAN) injection 4 mg  4 mg IntraVENous Q4H PRN    LORazepam (ATIVAN) tablet 1 mg  1 mg Oral Q6H PRN    oxymetazoline (AFRIN) 0.05 % nasal spray 2 Spray  2 Spray Both Nostrils BID PRN    sodium chloride (NS) flush 5-10 mL  5-10 mL IntraVENous PRN       Family History   Problem Relation Age of Onset    Colon Cancer Other     Cancer Other      lung     Social History     Social History    Marital status:      Spouse name: N/A    Number of children: N/A    Years of education: N/A     Occupational History    Not on file. Social History Main Topics    Smoking status: Current Every Day Smoker     Packs/day: 0.50     Years: 30.00    Smokeless tobacco: Never Used    Alcohol use No    Drug use: No    Sexual activity: Not on file     Other Topics Concern    Not on file     Social History Narrative     History   Smoking Status    Current Every Day Smoker    Packs/day: 0.50    Years: 30.00   Smokeless Tobacco    Never Used        Temp (24hrs), Av.1 °F (36.7 °C), Min:97.7 °F (36.5 °C), Max:98.5 °F (36.9 °C)    Visit Vitals    BP (!) 144/96    Pulse 97    Temp 98.3 °F (36.8 °C)    Resp 20    Ht 5' 4\" (1.626 m)    Wt 68.5 kg (151 lb)    SpO2 98%    BMI 25.92 kg/m2       ROS:A comprehensive review of systems was negative except for that written in the History of Present Illness. Physical Exam:    General: Well developed, well nourished 60 y.o.   female looking much older than stated age, in distress from limb weakness and progressing difficulty breathing  ENT: generalized facial tenderness more pronounced on the mandibular and maxillary areas  Head: normocephalic, without obvious abnormality, atraumatic  Mouth:  mucous membranes moist, pharynx normal without lesions  Neck: supple, symmetrical, trachea midline and no adenopathy   Cardio:  regular rate and rhythm, S1, S2 normal, no murmur, click, rub or gallop  Chest: inspection normal - no chest wall deformities or tenderness, respiratory effort normal  Lungs: clear to auscultation, no wheezes or rales and unlabored breathing  Abdomen: soft, non-tender. Bowel sounds normal. No masses, no organomegaly. Extremities:  no redness or tenderness in the calves or thighs, no edema  Neuro: awake and oriented, bilateral upper and lower extremity weakness, (can wiggle toes but unable to raise both knees, right arm weakness > left,  Has numbness across abdomen, ?hyperesthesia on face. Labs: Results:   Chemistry No results for input(s): GLU, NA, K, CL, CO2, BUN, CREA, CA, AGAP, BUCR, TBIL, GPT, AP, TP, ALB, GLOB, AGRAT in the last 72 hours. CBC w/Diff No results for input(s): WBC, RBC, HGB, HCT, PLT, GRANS, LYMPH, EOS, HGBEXT, HCTEXT, PLTEXT in the last 72 hours.    Microbiology Recent Labs      05/05/17   0115   Marianna Sloan M.D.  [facility]   [phone]

## 2017-05-05 NOTE — PROGRESS NOTES
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 MRI cervical neck unremarkable. LP this AM w/ elevated protein. Concern for guillain-barre. Transfer to SDU. Lyme status pending. PT/OT  - Completed full course of antibx. Observe off antibx  - Cont celexa, seroquel, ativan PRN. Still without complete psych diagnosis. Conversion disorder  - Lovenox    Pt requires SNF per PT and level 2 screening, 7-10 business days    Subjective     Pt s/e @ bedside  No major events overnight  She states she can move a little better with the oxycodone, but still very weak  S/p LP  Denies CP or SOB    Objective     Visit Vitals    BP (!) 140/101    Pulse 98    Temp 97.9 °F (36.6 °C)    Resp 20    Ht 5' 4\" (1.626 m)    Wt 68.5 kg (151 lb)    SpO2 99%    BMI 25.92 kg/m2       Physical Exam:  General Appearance: NAD, conversant  HEENT: Normocephalic, atraumatic  Neck: No JVD, supple  Lungs: CTA with normal respiratory effort  CV: RRR, no m/r/g  Abdomen: soft, non-tender, normal bowel sounds  Extremities: No edema, no cyanosis  Psych: Anxious, odd affect  Neuro: CN intact, moves all ext w/ no focal deficits    Lab/Data Reviewed:  BMP:   No results found for: NA, K, CL, CO2, AGAP, GLU, BUN, CREA, GFRAA, GFRNA  CBC:   No results found for: WBC, HGB, HGBEXT, HCT, HCTEXT, PLT, PLTEXT, HGBEXT, HCTEXT, PLTEXT    Imaging Reviewed:  Xr Spinal Punc Lumb Dx    Result Date: 5/5/2017  History:[facility] [place] COMPARISON: MRI brain 4/29/2017. Informed consent and utilizing sterile technique and local anesthetic a diagnostic lumbar puncture was performed under fluoroscopy utilizing a posterior midline approach at L2-3. A 20-gauge needle was used. 4 tubes of clear CSF forwarded to laboratory. No complications. Timeout performed at 0908. Fluoro time:  3.7 minutes Radiation dose: (Reference air kerma): 162.89 mGy Fluoroscopy was used for guidance to document position of the needle with images saved under fluoroscopy. IMPRESSION: 1. Diagnostic fluoroscopic lumbar puncture performed without complication. Patient transported back to the brower. Mri Cerv Spine W Wo Cont    Result Date: 5/5/2017  History: Progressive lower extremity weakness and developing upper extremity weakness, with associated peripheral paresthesias and pain in extremities. PROCEDURE: Sagittal spin echo T1 pre and post gadolinium sequences, sagittal TYRON proton density T2 and STIR sequences, and axial spin echo T1 pre and postgadolinium sequences and axial T2*GRE sequences were obtained of the cervical spine. FINDINGS:  There is normal alignment with mild anterior endplate multilevel spurring with no evidence of fracture or spondylolisthesis. No abnormal marrow signal. Visualized base of brain unremarkable with partial empty sella. Cervical and visualized upper thoracic cord have normal size and signal with no abnormal enhancement. C2/3 level: Mild facet arthropathy and minimal degenerative spondylosis, no significant stenosis. C3/4 level: Tiny posterior central disc protrusion with degenerative spondylosis without significant stenosis. C4/5 level: Very mild degenerative spondylosis and facet arthropathy with mild right-sided degenerative foraminal stenosis, no canal stenosis. C5/6 level: Mild degenerative spondylosis without significant stenosis. C6/7 level: Minimal degenerative spondylosis. C7/T1 level: Unremarkable. Contrast: There is no abnormal enhancement. IMPRESSION: 1. Unremarkable MRI cervical spine, no evidence of intrinsic cord lesion, stenosis or impingement. 2. Very mild degenerative changes, tiny nonimpinging posterior disc protrusion C3/4. These findings were called to the patient's nurse at 8:37 PM by Dr. Eliseo Severe.        Medications Reviewed:  Current Facility-Administered Medications   Medication Dose Route Frequency    oxyCODONE IR (ROXICODONE) tablet 10 mg  10 mg Oral Q4H PRN    thiamine (B-1) 100 mg in 0.9% sodium chloride 50 mL IVPB  100 mg IntraVENous Q24H    acetaminophen (TYLENOL) tablet 650 mg  650 mg Oral Q4H PRN    scopolamine (TRANSDERM-SCOP) 1.5 mg  1.5 mg TransDERmal Q72H    QUEtiapine (SEROquel) tablet 50 mg  50 mg Oral QHS    citalopram (CELEXA) tablet 20 mg  20 mg Oral DAILY    benzocaine-menthol (CEPACOL) lozenge 1 Lozenge  1 Lozenge Mucous Membrane PRN    enoxaparin (LOVENOX) injection 40 mg  40 mg SubCUTAneous Q24H    ondansetron (ZOFRAN) injection 4 mg  4 mg IntraVENous Q4H PRN    LORazepam (ATIVAN) tablet 1 mg  1 mg Oral Q6H PRN    oxymetazoline (AFRIN) 0.05 % nasal spray 2 Spray  2 Spray Both Nostrils BID PRN    sodium chloride (NS) flush 5-10 mL  5-10 mL IntraVENous PRN           Gabi Cash DO  Internal Medicine, Hospitalist  Pager: [phone]  [address] [place] [address]

## 2017-05-05 NOTE — PROGRESS NOTES
[facility] was receiving an [facility] testing procedure. 2009: Pt back in the room, no complain voiced. 2035: Telephone call received from Radiologist, Dione Sheehan on pt regarding MRI procedure; no acute finding noted, see result review for further details. 2135: Bladder scan reveals >365, abraham catheter inserted per order, UA sent to lab, pt tolerated well.  2205: Pt's significant other stated pt's residence has mould infestation through the air conditioning and might have contributed to her illness. RN will pass on to day staff.  0503: Lovenox held per Dr. Silvester Dubin order for possible lumbar puncture this Am, Charge nurse made aware.  8996: Bedside and Verbal shift change report given to BASIL Yu (oncoming nurse) by Paolo Gan RN (offgoing nurse). Report included the following information SBAR, Kardex, Intake/Output, MAR and Recent Results.

## 2017-05-05 NOTE — PROGRESS NOTES
1500 Received report from [facility] from [place]. 1 Pt is still in [address] [place] [address]. Dr. Kayley Phillip wants IgA result being followed up . Spoke to Mychal in the Lab stated\" test was sent out ,result will be in 4 days. Dr. Kayley Phillip made aware. MD will follow up with the lab.  1600 Received pt per bed transport from [address] [place] [address] accompanied by nurse Katlyn Rangel and other nurse. Pt is alert, oriented x 4. Very weak, hardly able to move her extremities. Very sensitive to touch all over,described as pins and needles . ST on the monitor. Very anxious. Hyperventilating. Calmed down with reassurance. Lungs clear to auscultation. Abdomen soft, non tender. Haley intact with dark nicholas urine. Skin: warm and dry. Chlorhexidine bath done in front as pt tolerated but refused to be turned. [address] [facility] Dr. Kayley Phillip at bedside   4577 Neurologist still at bedside doing a nerve conduction test.  1900 MD is finishing up the test.  Pt is alert, oriented. NAD  1916 Lab called, IgA result in. Dr. Kayley Phillip made awre. Bedside and Verbal shift change report given to Priyanka Cheema (oncoming nurse) by Kelsi Chong RN   (offgoing nurse).  Report included the following information SBAR, Kardex, Intake/Output, MAR and Cardiac Rhythm SR-ST.

## 2017-05-05 NOTE — ROUTINE PROCESS
0801-Assessment completed, call bell within reach, no distress noted, voiced no complaints at this time. 0823-patient taken to IR for spinal puncture procedure. 1000-patient returned from IR, lumbar with band aid intact, no bleeding or drainage noted. Updated with am medications and medicated for pain per patient request.  1230-no change in condition, no distress noted. 1412-resting quietly in bed, medicated for pain per patient request.  1500-TRANSFER - OUT REPORT:    Verbal report given to Vidya(name) on [facility]  being transferred to Neuro ICU(unit) for routine progression of care       Report consisted of patients Situation, Background, Assessment and   Recommendations(SBAR). Information from the following report(s) SBAR, MAR and Recent Results was reviewed with the receiving nurse. Lines:   Peripheral IV 04/30/17 Right Antecubital (Active)   Site Assessment Clean, dry, & intact 5/5/2017  4:08 PM   Phlebitis Assessment 0 5/5/2017  4:08 PM   Infiltration Assessment 0 5/5/2017  4:08 PM   Dressing Status Clean, dry, & intact 5/5/2017  4:08 PM   Dressing Type Transparent 5/5/2017  4:08 PM   Hub Color/Line Status Patent;Pink 5/5/2017  4:08 PM   Action Taken Open ports on tubing capped 5/5/2017  4:08 PM   Alcohol Cap Used Yes 5/5/2017  4:08 PM        Opportunity for questions and clarification was provided. Patient transported with:   Patients medications from home  Registered Nurse  1500-Neurologist and ID in to see patient. 1540-transferred to [id number] via be.

## 2017-05-05 NOTE — PROGRESS NOTES
Neurology Progress Note      Patient: Collette Corpus MRN: [id number]  SSN: [id number]    YOB: 1960  Age: 60 y.o. Sex: female      Admit Date: 4/26/2017    LOS: 8 days     Subjective:     Chief Complaint:  Encephalopathy Acute polyneuropathy. MRI cervical spine was cleared. LP with elevated protein and no cells. Her Boyfriend was at bedside and I discussed with them the process so far. FVC was not checked since the machine was not working and was ordered from [facility]. Objective:     Vitals:    05/05/17 0210 05/05/17 0708 05/05/17 1016 05/05/17 1411   BP: 120/81 (!) 142/93 (!) 140/101 (!) 144/96   Pulse: 100 98 98 97   Resp: 18 20 20 20   Temp: 97.7 °F (36.5 °C) 98.1 °F (36.7 °C) 97.9 °F (36.6 °C) 98.3 °F (36.8 °C)   SpO2: 97% 99% 99% 98%   Weight:       Height:                Physical Exam:   Awake and oriented time three. PERRL, face symmetric. Motor exam: Some Neck flexion weakness ( not good effort) and better neck extension. Strength in the upper: Deltoid 2/3 biceps and triceps 3/4.  and fingers abduction 3/5. In lower extremities: hip flexion 1/5, knees extension and flexion 2/5 and 2-3/5 plantarflexion and dorsiflexion   DTR: Areflexia.     Lab/Data Review:  Recent Results (from the past 48 hour(s))   URINALYSIS W/ RFLX MICROSCOPIC    Collection Time: 05/04/17 10:08 PM   Result Value Ref Range    Color DARK YELLOW      Appearance CLEAR      Specific gravity >1.030 (H) 1.005 - 1.030    pH (UA) 6.0 5.0 - 8.0      Protein TRACE (A) NEG mg/dL    Glucose 100 (A) NEG mg/dL    Ketone >160 (A) NEG mg/dL    Bilirubin MODERATE (A) NEG      Blood NEGATIVE  NEG      Urobilinogen 1.0 0.2 - 1.0 EU/dL    Nitrites POSITIVE (A) NEG      Leukocyte Esterase TRACE (A) NEG     URINE MICROSCOPIC ONLY    Collection Time: 05/04/17 10:08 PM   Result Value Ref Range    WBC 2 to 3 0 - 4 /hpf    RBC 0 0 - 5 /hpf    Epithelial cells FEW 0 - 5 /lpf    Bacteria 1+ (A) NEG /hpf    Yeast 2+ (A) NEG   VITAMIN B12 & FOLATE    Collection Time: 05/04/17 10:30 PM   Result Value Ref Range    Vitamin B12 371 211 - 911 pg/mL    Folate 2.4 (L) 3.10 - 17.50 ng/mL   HEMOGLOBIN A1C WITH EAG    Collection Time: 05/04/17 10:30 PM   Result Value Ref Range    Hemoglobin A1c 5.9 (H) 4.2 - 5.6 %    Est. average glucose 123 mg/dL   PROTEIN, CSF    Collection Time: 05/05/17  9:24 AM   Result Value Ref Range    Tube No. 1      Protein, (H) 15 - 45 MG/DL   GLUCOSE, CSF    Collection Time: 05/05/17  9:24 AM   Result Value Ref Range    Tube No. 1      Glucose,CSF 59 40 - 70 MG/DL   CULTURE, CSF W GRAM STAIN    Collection Time: 05/05/17  9:26 AM   Result Value Ref Range    Special Requests: TUBE 2, CULTURE AND SENSITIVTY AND GRAM STAIN. GRAM STAIN RARE  WBC'S        GRAM STAIN NO ORGANISMS SEEN      Culture result: PENDING    CELL COUNT, CSF    Collection Time: 05/05/17  9:27 AM   Result Value Ref Range    CSF TUBE NO. 3      CSF COLOR COLORLESS      CSF APPEARANCE CLEAR      CSF RBCS 11 (H) 0 /cu mm    CSF WBCS 0 <5 /cu mm        Review of systems: Denies headaches or chest pain.     Medications Reviewed:  Current Facility-Administered Medications   Medication Dose Route Frequency    oxyCODONE IR (ROXICODONE) tablet 10 mg  10 mg Oral Q4H PRN    thiamine (B-1) 100 mg in 0.9% sodium chloride 50 mL IVPB  100 mg IntraVENous Q24H    acetaminophen (TYLENOL) tablet 650 mg  650 mg Oral Q4H PRN    scopolamine (TRANSDERM-SCOP) 1.5 mg  1.5 mg TransDERmal Q72H    QUEtiapine (SEROquel) tablet 50 mg  50 mg Oral QHS    citalopram (CELEXA) tablet 20 mg  20 mg Oral DAILY    benzocaine-menthol (CEPACOL) lozenge 1 Lozenge  1 Lozenge Mucous Membrane PRN    enoxaparin (LOVENOX) injection 40 mg  40 mg SubCUTAneous Q24H    ondansetron (ZOFRAN) injection 4 mg  4 mg IntraVENous Q4H PRN    LORazepam (ATIVAN) tablet 1 mg  1 mg Oral Q6H PRN    oxymetazoline (AFRIN) 0.05 % nasal spray 2 Spray  2 Spray Both Nostrils BID PRN    sodium chloride (NS) flush 5-10 mL  5-10 mL IntraVENous PRN     Past Medical History:   Diagnosis Date    Allergic rhinitis     Angina pectoris (HCC)     Anxiety     Bronchitis     Chronic fatigue syndrome     Chronic pain     COPD (chronic obstructive pulmonary disease) (HCC)     Generalized pain     Headache, tension-type     HTN (hypertension)     Hyperlipidemia     Hypokalemia     Insomnia     Leg swelling     Lyme disease     Nicotine dependence     OA (osteoarthritis)     RA (rheumatoid arthritis) (HCC)     Vertigo     Vitamin D deficiency      Social History     Social History    Marital status:      Spouse name: N/A    Number of children: N/A    Years of education: N/A     Occupational History    Not on file. Social History Main Topics    Smoking status: Current Every Day Smoker     Packs/day: 0.50     Years: 30.00    Smokeless tobacco: Never Used    Alcohol use No    Drug use: No    Sexual activity: Not on file     Other Topics Concern    Not on file     Social History Narrative         CT Results (most recent):    Results from Hospital Encounter encounter on 04/26/17   CT ABD PELV W CONT   Narrative CT OF THE ABDOMEN AND PELVIS, WITH CONTRAST    INDICATION: Upper abdominal pain and abnormal LFTs    COMPARISON: 8/8/2016. TECHNIQUE: Standard helical CT performed through the abdomen and pelvis with IV  contrast.  Coronal and sagittal reformations were generated. One or more dose reduction techniques were used on this CT: automated exposure  control, adjustment of the mAs and/or kVp according to patient's size, and  iterative reconstruction techniques. The specific techniques utilized on this CT  exam have been documented in the patient's electronic medical record.    ============    FINDINGS:    INFERIOR THORAX: Moderate right and mild left dependent atelectasis. No acute  pulmonary infiltrate. Mild global cardiomegaly. No pericardial effusion.     LIVER/BILIARY: Diffuse hepatic steatosis. No suspicious liver lesion. No biliary  dilation. Punctate gallstones with no secondary findings of cholecystitis. SPLEEN: Normal.    PANCREAS: Normal.    ADRENALS: Normal.    KIDNEYS: Exophytic right upper pole renal cyst. No acute or suspicious renal  abnormality. Donice Patricia LYMPH NODES: No mesenteric or retroperitoneal lymphadenopathy. GI TRACT: No wall thickening or dilation. Small hiatal hernia. Normal appendix. Scattered colonic diverticulosis, with no findings of an acute diverticulitis. VASCULATURE: Normal caliber distal thoracic and abdominal aorta. PELVIC ORGANS: Haley catheter is present within the nondistended bladder. Heterogeneously enhancing endometrial complex. BONES: No acute osseous abnormality. Degenerative changes of the thoracic spine. OTHER: No ascites or free intraperitoneal air.    ============         Impression IMPRESSION:    No acute or focal abnormality to explain patient's abdominal pain. There is  diffuse hepatic steatosis. Cholelithiasis. Colonic diverticulosis with no  findings of an acute diverticulitis. Note: Preliminary report sent to the Emergency Department by the radiology  resident at the time of the study. MRI Results (most recent):    Results from [place] encounter on 04/26/17   MRI CERV SPINE W WO CONT   Narrative History: Progressive lower extremity weakness and developing upper extremity  weakness, with associated peripheral paresthesias and pain in extremities. PROCEDURE:    Sagittal spin echo T1 pre and post gadolinium sequences, sagittal TYRON proton  density T2 and STIR sequences, and axial spin echo T1 pre and postgadolinium  sequences and axial T2*GRE sequences were obtained of the cervical spine. FINDINGS:      There is normal alignment with mild anterior endplate multilevel spurring with  no evidence of fracture or spondylolisthesis.  No abnormal marrow signal.  Visualized base of brain unremarkable with partial empty sella. Cervical and  visualized upper thoracic cord have normal size and signal with no abnormal  enhancement. C2/3 level: Mild facet arthropathy and minimal degenerative spondylosis, no  significant stenosis. C3/4 level: Tiny posterior central disc protrusion with degenerative spondylosis  without significant stenosis. C4/5 level: Very mild degenerative spondylosis and facet arthropathy with mild  right-sided degenerative foraminal stenosis, no canal stenosis. C5/6 level: Mild degenerative spondylosis without significant stenosis. C6/7 level: Minimal degenerative spondylosis. C7/T1 level: Unremarkable. Contrast: There is no abnormal enhancement. Impression IMPRESSION:    1. Unremarkable MRI cervical spine, no evidence of intrinsic cord lesion,  stenosis or impingement. 2. Very mild degenerative changes, tiny nonimpinging posterior disc protrusion  C3/4. These findings were called to the patient's nurse at 8:37 PM by Dr. Darvin Aguilar. Assessment/Plan:     Principal Problem:    Encephalopathy (4/27/2017)    Active Problems:    Dehydration (4/27/2017)      Hypokalemia (4/27/2017)      Lactic acidosis (4/27/2017)      Polycythemia ([address] (4/27/2017)      Leukocytosis (4/27/2017)      UTI (urinary tract infection) (4/27/2017)      Elevated liver enzymes (4/27/2017)    > Progressive symmetric ascending motor paralysis over the last 3-4 weeks.      Exam with sings of severe polyneuropathy with Areflexia and significant muscle weakness in LE > UE. CSF no cells but elevated protein.     Worked for acute/subacute polyneuropathy in process but acute demyelinating polyneuropathy ( Guillain Quebeck syndrome) is in the top differential diagnosis.      Workup sent including urine proporphyrinogen spot and heavy metal serum. Needs ID on the case as well. Awaiting IgA level, I asked to be run stat but has to be sent to outside lab.      Will attempt EMG later if can tolerate. I discussed with her intervention options of IVIG or plasmapheresis and we agreed with IVIG. I discussed side effects of renal dysfunction or venous/arterial thrombosis. She will be moved to step down unit for more monitoring. Lauren is ordering the [facility] machine from [facility] since the one here is not working.     > continue [id number] mg IV every day for few days . Awaiting level to return. + Folic.     >Intersting finding on her nuvia MRI with asymmetric left hemispheric atrophy which raises the possibility of some cerebral degenerative disease but unrelated to her current situation. .        Addendum:  Ig A level will be send to [facility] today and hope results by the evening. If no severe deficiency then plan IVIG 0.4 grm/kg per day for 5 days.           Signed By: Marisa Delgado MD     May 5, 2017

## 2017-05-05 NOTE — PROGRESS NOTES
Chart reviewed. Faxed psych from 5/4/17 to [facility], as need diagnosis, to get level 2 process started for nursing home placement, which will take 7-10 business days once started. PLAN: long term nursing home when medically stable, level 2 approved & bed available. Will cont to follow. Janette Tristan, ext. [phone].

## 2017-05-06 ENCOUNTER — APPOINTMENT (OUTPATIENT)
Dept: MRI IMAGING | Age: 57
DRG: 049 | End: 2017-05-06
Attending: PSYCHIATRY & NEUROLOGY
Payer: MEDICAID

## 2017-05-06 LAB
ALBUMIN SERPL BCP-MCNC: 2.8 G/DL (ref 3.4–5)
ALBUMIN/GLOB SERPL: 0.7 {RATIO} (ref 0.8–1.7)
ALP SERPL-CCNC: 84 U/L (ref 45–117)
ALT SERPL-CCNC: 112 U/L (ref 13–56)
ANION GAP BLD CALC-SCNC: 13 MMOL/L (ref 3–18)
AST SERPL W P-5'-P-CCNC: 62 U/L (ref 15–37)
BASOPHILS # BLD AUTO: 0 K/UL (ref 0–0.06)
BASOPHILS # BLD: 0 % (ref 0–2)
BILIRUB SERPL-MCNC: 1.1 MG/DL (ref 0.2–1)
BUN SERPL-MCNC: 7 MG/DL (ref 7–18)
BUN/CREAT SERPL: 21 (ref 12–20)
CALCIUM SERPL-MCNC: 8.9 MG/DL (ref 8.5–10.1)
CHLORIDE SERPL-SCNC: 102 MMOL/L (ref 100–108)
CO2 SERPL-SCNC: 21 MMOL/L (ref 21–32)
CREAT SERPL-MCNC: 0.33 MG/DL (ref 0.6–1.3)
DIFFERENTIAL METHOD BLD: ABNORMAL
EOSINOPHIL # BLD: 0.1 K/UL (ref 0–0.4)
EOSINOPHIL NFR BLD: 1 % (ref 0–5)
ERYTHROCYTE [DISTWIDTH] IN BLOOD BY AUTOMATED COUNT: 14.1 % (ref 11.6–14.5)
GLOBULIN SER CALC-MCNC: 3.8 G/DL (ref 2–4)
GLUCOSE SERPL-MCNC: 69 MG/DL (ref 74–99)
HCT VFR BLD AUTO: 35.7 % (ref 35–45)
HGB BLD-MCNC: 12.8 G/DL (ref 12–16)
LYMPHOCYTES # BLD AUTO: 24 % (ref 21–52)
LYMPHOCYTES # BLD: 1.8 K/UL (ref 0.9–3.6)
MCH RBC QN AUTO: 31 PG (ref 24–34)
MCHC RBC AUTO-ENTMCNC: 35.9 G/DL (ref 31–37)
MCV RBC AUTO: 86.4 FL (ref 74–97)
MONOCYTES # BLD: 0.5 K/UL (ref 0.05–1.2)
MONOCYTES NFR BLD AUTO: 7 % (ref 3–10)
NEUTS SEG # BLD: 5.3 K/UL (ref 1.8–8)
NEUTS SEG NFR BLD AUTO: 68 % (ref 40–73)
PLATELET # BLD AUTO: 314 K/UL (ref 135–420)
PMV BLD AUTO: 9.4 FL (ref 9.2–11.8)
POTASSIUM SERPL-SCNC: 3.3 MMOL/L (ref 3.5–5.5)
PROT SERPL-MCNC: 6.6 G/DL (ref 6.4–8.2)
RBC # BLD AUTO: 4.13 M/UL (ref 4.2–5.3)
SODIUM SERPL-SCNC: 136 MMOL/L (ref 136–145)
WBC # BLD AUTO: 7.7 K/UL (ref 4.6–13.2)

## 2017-05-06 PROCEDURE — 74011250636 HC RX REV CODE- 250/636: Performed by: FAMILY MEDICINE

## 2017-05-06 PROCEDURE — 86644 CMV ANTIBODY: CPT | Performed by: INTERNAL MEDICINE

## 2017-05-06 PROCEDURE — 72158 MRI LUMBAR SPINE W/O & W/DYE: CPT

## 2017-05-06 PROCEDURE — 36415 COLL VENOUS BLD VENIPUNCTURE: CPT | Performed by: INTERNAL MEDICINE

## 2017-05-06 PROCEDURE — 74011250636 HC RX REV CODE- 250/636: Performed by: HOSPITALIST

## 2017-05-06 PROCEDURE — 80053 COMPREHEN METABOLIC PANEL: CPT | Performed by: HOSPITALIST

## 2017-05-06 PROCEDURE — 77030020263 HC SOL INJ SOD CL0.9% LFCR 1000ML

## 2017-05-06 PROCEDURE — 85025 COMPLETE CBC W/AUTO DIFF WBC: CPT | Performed by: HOSPITALIST

## 2017-05-06 PROCEDURE — A9585 GADOBUTROL INJECTION: HCPCS | Performed by: HOSPITALIST

## 2017-05-06 PROCEDURE — 74011250637 HC RX REV CODE- 250/637: Performed by: PSYCHIATRY & NEUROLOGY

## 2017-05-06 PROCEDURE — 74011250637 HC RX REV CODE- 250/637: Performed by: INTERNAL MEDICINE

## 2017-05-06 PROCEDURE — 65660000001 HC RM ICU INTERMED STEPDOWN

## 2017-05-06 PROCEDURE — 74011250636 HC RX REV CODE- 250/636: Performed by: PSYCHIATRY & NEUROLOGY

## 2017-05-06 PROCEDURE — 74011250637 HC RX REV CODE- 250/637: Performed by: HOSPITALIST

## 2017-05-06 PROCEDURE — 74011000258 HC RX REV CODE- 258: Performed by: PSYCHIATRY & NEUROLOGY

## 2017-05-06 RX ORDER — SODIUM CHLORIDE 9 MG/ML
100 INJECTION, SOLUTION INTRAVENOUS CONTINUOUS
Status: DISCONTINUED | OUTPATIENT
Start: 2017-05-06 | End: 2017-05-06

## 2017-05-06 RX ORDER — POTASSIUM CHLORIDE, DEXTROSE MONOHYDRATE AND SODIUM CHLORIDE 300; 5; 900 MG/100ML; G/100ML; MG/100ML
INJECTION, SOLUTION INTRAVENOUS CONTINUOUS
Status: DISCONTINUED | OUTPATIENT
Start: 2017-05-06 | End: 2017-05-09

## 2017-05-06 RX ORDER — SODIUM CHLORIDE 9 MG/ML
250 INJECTION, SOLUTION INTRAVENOUS ONCE
Status: COMPLETED | OUTPATIENT
Start: 2017-05-06 | End: 2017-05-06

## 2017-05-06 RX ORDER — SENNOSIDES 8.6 MG/1
2 TABLET ORAL
Status: DISCONTINUED | OUTPATIENT
Start: 2017-05-06 | End: 2017-05-19 | Stop reason: HOSPADM

## 2017-05-06 RX ADMIN — SODIUM PHOSPHATE, DIBASIC AND SODIUM PHOSPHATE, MONOBASIC 118 ML: 7; 19 ENEMA RECTAL at 18:57

## 2017-05-06 RX ADMIN — OXYCODONE HYDROCHLORIDE 10 MG: 5 TABLET ORAL at 20:31

## 2017-05-06 RX ADMIN — OXYCODONE HYDROCHLORIDE 10 MG: 5 TABLET ORAL at 04:28

## 2017-05-06 RX ADMIN — GADOBUTROL 7.5 ML: 604.72 INJECTION INTRAVENOUS at 10:00

## 2017-05-06 RX ADMIN — FOLIC ACID 1 MG: 1 TABLET ORAL at 08:30

## 2017-05-06 RX ADMIN — SODIUM CHLORIDE 100 ML/HR: 900 INJECTION, SOLUTION INTRAVENOUS at 01:07

## 2017-05-06 RX ADMIN — IMMUNE GLOBULIN INTRAVENOUS (HUMAN) 5 G: 5 INJECTION, SOLUTION INTRAVENOUS at 01:51

## 2017-05-06 RX ADMIN — THIAMINE HYDROCHLORIDE 100 MG: 100 INJECTION, SOLUTION INTRAMUSCULAR; INTRAVENOUS at 16:29

## 2017-05-06 RX ADMIN — CITALOPRAM HYDROBROMIDE 20 MG: 20 TABLET ORAL at 08:30

## 2017-05-06 RX ADMIN — DEXTROSE MONOHYDRATE, SODIUM CHLORIDE, AND POTASSIUM CHLORIDE: 50; 9; 2.98 INJECTION, SOLUTION INTRAVENOUS at 16:11

## 2017-05-06 RX ADMIN — OXYCODONE HYDROCHLORIDE 10 MG: 5 TABLET ORAL at 16:29

## 2017-05-06 RX ADMIN — ENOXAPARIN SODIUM 40 MG: 40 INJECTION SUBCUTANEOUS at 04:28

## 2017-05-06 RX ADMIN — OXYCODONE HYDROCHLORIDE 10 MG: 5 TABLET ORAL at 00:00

## 2017-05-06 RX ADMIN — LORAZEPAM 1 MG: 1 TABLET ORAL at 08:30

## 2017-05-06 RX ADMIN — SODIUM CHLORIDE 250 ML: 900 INJECTION, SOLUTION INTRAVENOUS at 01:07

## 2017-05-06 RX ADMIN — QUETIAPINE FUMARATE 50 MG: 25 TABLET, FILM COATED ORAL at 22:00

## 2017-05-06 NOTE — PROGRESS NOTES
Rec'd pt transfer from icu neuro. Offgoing RN, Clarissa Grayson. Report included pt refusing to be turned due to \"hurting too bad\" as described by RN. Pt oriented x 4. All ext's with generalized weakness/ partial ability to move due to \"pain all over\". Pt verbalizes last BM was \"weeks ago. \"  Unable to determine a BM happened since pt's admission on 4-27-17, no documentation found. Checked with offgoing RN Clarissa Grayson, also reported no info available. Will update, report to oncoming RN for days to obtain possibly stool softner/laxitive for pt, if possible. Pt refuses SCD's to lower ext's.

## 2017-05-06 NOTE — PROGRESS NOTES
Neurology Progress Note      Patient: Yamile Brown MRN: [id number]  SSN: [id number]    YOB: 1960  Age: 60 y.o. Sex: female      Admit Date: 4/26/2017    LOS: 9 days     Subjective:     Chief Complaint:  Encephalopathy     Acute axonal polyneuropathy. EMG yesterday was suggestive of acute axonal denervation process. L-MRI was not significant. She received IVIG last night with no problems. ID help appreciated to screen for CJ and CMV. Been monitored in stepdown unit. No new respiratory complaints. C/o of sand taste in valiva . FVC was 1.35 and her predicted age 3.80.      Objective:     Vitals:    05/06/17 0600 05/06/17 0700 05/06/17 0800 05/06/17 0900   BP:  137/79 149/85 (!) 148/91   Pulse:  93 86 90   Resp:  13 28 (!) 31   Temp:       SpO2:  97% 99% 99%   Weight: 68.1 kg (150 lb 2.1 oz)      Height: 5' 4\" (1.626 m)               Physical Exam:   Awake and oriented time three. PERRL. Motor exam: Still with significant generalized weakness L> U extremities: Strength in the upper: Deltoid 1/3 biceps and triceps 2-3/4.  and fingers abduction 3/5. In lower extremities: hip flexion 1/5, knees extension and flexion 1-2/5 and 2-/5 plantarflexion and dorsiflexion   DTR: Areflexia. Lab/Data Review: Labs reviewed. Review of systems: Denies headaches or chest pain.     Medications Reviewed:  Current Facility-Administered Medications   Medication Dose Route Frequency    0.9% sodium chloride infusion  100 mL/hr IntraVENous CONTINUOUS    oxyCODONE IR (ROXICODONE) tablet 10 mg  10 mg Oral O8O PRN    folic acid (FOLVITE) tablet 1 mg  1 mg Oral DAILY    immune globulin 10% (FLEBOGAMMA DIF) infusion 20 g  20 g IntraVENous Q24H    immune globulin 10% (FLEBOGAMMA DIF) infusion 5 g  5 g IntraVENous Q24H    thiamine (B-1) 100 mg in 0.9% sodium chloride 50 mL IVPB  100 mg IntraVENous Q24H    acetaminophen (TYLENOL) tablet 650 mg  650 mg Oral Q4H PRN    scopolamine (TRANSDERM-SCOP) 1.5 mg  1.5 mg TransDERmal Q72H    QUEtiapine (SEROquel) tablet 50 mg  50 mg Oral QHS    citalopram (CELEXA) tablet 20 mg  20 mg Oral DAILY    benzocaine-menthol (CEPACOL) lozenge 1 Lozenge  1 Lozenge Mucous Membrane PRN    enoxaparin (LOVENOX) injection 40 mg  40 mg SubCUTAneous Q24H    ondansetron (ZOFRAN) injection 4 mg  4 mg IntraVENous Q4H PRN    LORazepam (ATIVAN) tablet 1 mg  1 mg Oral Q6H PRN    oxymetazoline (AFRIN) 0.05 % nasal spray 2 Spray  2 Spray Both Nostrils BID PRN    sodium chloride (NS) flush 5-10 mL  5-10 mL IntraVENous PRN     Past Medical History:   Diagnosis Date    Allergic rhinitis     Angina pectoris ([facility])     Anxiety     Bronchitis     Chronic fatigue syndrome     Chronic pain     COPD (chronic obstructive pulmonary disease) ([facility])     Generalized pain     Headache, tension-type     HTN (hypertension)     Hyperlipidemia     Hypokalemia     Insomnia     Leg swelling     Lyme disease     Nicotine dependence     OA (osteoarthritis)     RA (rheumatoid arthritis) ([facility])     Vertigo     Vitamin D deficiency      Social History     Social History    Marital status:      Spouse name: N/A    Number of children: N/A    Years of education: N/A     Occupational History    Not on file. Social History Main Topics    Smoking status: Current Every Day Smoker     Packs/day: 0.50     Years: 30.00    Smokeless tobacco: Never Used    Alcohol use No    Drug use: No    Sexual activity: Not on file     Other Topics Concern    Not on file     Social History Narrative         MRI Results (most recent):    Results from Hospital Encounter encounter on 04/26/17   MRI LUMB SPINE W WO CONT   Narrative          Impression IMPRESSION:  1. Negative for imaging evidence of Martha Moros syndrome and other acutecauda equina processes.  Widely patent central spinal canal.  2.  Right greater than left posterior paraspinal muscle edema -- a commonfinding that often reflects either chronic denervation edema or acute/subacute muscle strain. 3.  Straightened lumbar lordosis with minor lumbar scoliosis. 4.  Potential for right L5 nerve root impingement in the extraforaminal zone. 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    Exam with sings of severe polyneuropathy with Areflexia and significant muscle weakness in LE > UE. CSF no cells but elevated protein.   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 Started on IVIG. Other test results for other etiologies are pending.      Cervical Spine MRI was cleared. Plan:  Workup sent including urine proporphyrinogen spot and heavy metal serum. campyliobacter and CMV per ID.        > continue [id number] mg for 3 days and folic acid. Awaiting Thiamine level. I asked Pulmonary/intensavist to see her officially to assess her respiratory status.     >Intersting finding on her nuvia MRI with asymmetric left hemispheric atrophy which raises the possibility of some cerebral degenerative disease but unrelated to her current situation. .            Signed By: Bulmaro Martin MD     May 6, 2017

## 2017-05-06 NOTE — PROGRESS NOTES
0800 .Bedside and Verbal shift change report given to Nataly Sarabia (oncoming nurse) by Lily Powell (offgoing nurse). Report included the following information SBAR, Kardex and MAR.       0800 Performed shift assessment   0930 Transported patient down to [facility]  1050 Returned from [facility] Patient complained of having a hard time passing bowels  Notified doctor of patient having issues defecating and having very low urine output   Orders were given   1600 Performed shift assessment  0800/1200/1600 Performed abraham care  1930 . Bedside and Verbal shift change report given to Lily Powell (oncoming nurse) by Nataly Sarabia (offgoing nurse). Report included the following information SBAR and Kardex.

## 2017-05-06 NOTE — PROGRESS NOTES
Internal Medicine Progress Note    Patient's Name: Gonzalo Lipscomb Date: 4/26/2017  Length of Stay: 9      Assessment/Plan     #Possible Polyneuropathy, Possible Acute Demyelinating Polyneuropathy  #UTI  #Transaminitis Likely 2/2 Fatty Liver  #COPD  #Dysphagia  #Depression w/ Possible Component of Psychosis  #Anxiety Disoder  #Seasonal Allergies  #Acute Metabolic Encephalopathy, resolved  #AICHA, resolved  #Hypokalemia, resolved  #Lactic Acidosis, resolved  #DVT PPx    - Neuro following. MRI lumbar negative for acute findings. Awaiting multiple send out labs. Cont IVIG. Vital capacity checks. Pulm consulted by neuro. PT/OT  - Completed full course of antibx. Observe off antibx  - Cont celexa, seroquel, ativan PRN. Still without complete psych diagnosis.    - Lovenox    Subjective     Pt s/e @ bedside  No major events overnight  Still c/o sand in mouth  Does not want a NGT for feedings if we need to go that route  Denies CP or SOB    Objective     Visit Vitals    BP (!) 148/91    Pulse 90    Temp 99 °F (37.2 °C)    Resp (!) 31    Ht 5' 4\" (1.626 m)    Wt 68.1 kg (150 lb 2.1 oz)    SpO2 99%    BMI 25.77 kg/m2       Physical Exam:  General Appearance: NAD, conversant  HEENT: Normocephalic, atraumatic  Neck: No JVD, supple  Lungs: CTA with normal respiratory effort  CV: RRR, no m/r/g  Abdomen: soft, non-tender, normal bowel sounds  Extremities: No edema, no cyanosis  Psych: Anxious, odd affect  Neuro: CN intact, moves all ext w/ no focal deficits but w/ difficulty     Lab/Data Reviewed:  BMP:   Lab Results   Component Value Date/Time     05/06/2017 04:30 AM    K 3.3 (L) 05/06/2017 04:30 AM     05/06/2017 04:30 AM    CO2 21 05/06/2017 04:30 AM    AGAP 13 05/06/2017 04:30 AM    GLU 69 (L) 05/06/2017 04:30 AM    BUN 7 05/06/2017 04:30 AM    CREA 0.33 (L) 05/06/2017 04:30 AM    GFRAA >60 05/06/2017 04:30 AM    GFRNA >60 05/06/2017 04:30 AM     CBC:   Lab Results   Component Value Date/Time    WBC 7.7 05/06/2017 04:30 AM    HGB 12.8 05/06/2017 04:30 AM    HCT 35.7 05/06/2017 04:30 AM     05/06/2017 04:30 AM       Imaging Reviewed:  Mri Lumb Spine W Wo Cont    Result Date: 5/6/2017  EXAM:  MRI lumbar spine without and with intravenous contrast. COMPARISON: No prior MRI is available for comparison. PROVIDED REASON FOR EXAM: \"Weakness, leg, progressive; possible GBS but to rule out major lumbar process\". RADIOGRAPHY AVAILABLE FOR CORRELATION:  May 24, 2007. _______________ FINDINGS:      IMAGE QUALITY:  Axial images are mildly to moderately degraded by motion artifact, and parasagittal images are mildly degraded by motion artifact. SEGMENTAL DESIGNATION:  There are 5 lumbar segments. INTRADURAL:  The conus terminates at L2 and is normal.  The nerve roots have a normal distribution in the thecal sac without evidence of arachnoiditis. No cauda equina nerve root enhancement or swelling is detected as imaging evidence of Guillain-Arvin syndrome. Punctate focus of enhancement along the ventral tip of the conus medullaris on series 8 image 7 is favored to be venous, and exaggerated by motion artifact. SPINAL COLUMN AND MUSCULATURE:   Straightened lumbar lordosis. Minimal dextroconvex lumbar spinal curvature, broad, with apex at L3-L4. The background bone marrow signal is normal. There are no acute/subacute or chronic compression fractures. The posterior paraspinal musculature is moderately infiltrated by fat. Right greater than left posterior paraspinal muscle edema from the level of L1-L4, strain versus denervation edema. L5-S1:                 STRUCTURE: Asymmetric right-sided disc height loss and right far lateral zone disc bulge with osteophyte. Mild facet arthrosis. IMPINGEMENT: Right L5 nerve root is contacted in the far lateral zone by disc osteophyte. Otherwise unremarkable.            L4-L5:                STRUCTURE: Moderate disc desiccation with mild extracanalicular disc bulging and endplate osteophyte proliferation. Mild facet arthrosis. Epidural lipomatosis. Baastrup type interspinous arthrosis without interstitial bursa formation. IMPINGEMENT: None detected. L3-L4:                 STRUCTURE: Moderate disc desiccation with mild extracanalicular disc bulging and endplate osteophyte proliferation. Mild facet arthrosis. Epidural lipomatosis. IMPINGEMENT: None detected. L2-L3:                STRUCTURE: Prevertebral disc osteophyte. Otherwise unremarkable. IMPINGEMENT: Prevertebral disc osteophyte. Otherwise unremarkable. L1-L2:                 STRUCTURE: Unremarkable. IMPINGEMENT: None detected. LOWER THORACIC SPINE:                T12-L1: Unremarkable. T11-T12: Unremarkable. T10-T11:  Unremarkable. EXTRASPINAL TISSUES:  Unremarkable. SACROILIAC JOINTS:  Unremarkable. _______________     IMPRESSION: 1. Negative for imaging evidence of Randell Daquan syndrome and other acute cauda equina processes. Widely patent central spinal canal. 2.  Right greater than left posterior paraspinal muscle edema -- a common finding that often reflects either chronic denervation edema or acute/subacute muscle strain. 3.  Straightened lumbar lordosis with minor lumbar scoliosis. 4.  Potential for right L5 nerve root impingement in the extraforaminal zone.  _______________       Medications Reviewed:  Current Facility-Administered Medications   Medication Dose Route Frequency    0.9% sodium chloride infusion  100 mL/hr IntraVENous CONTINUOUS    oxyCODONE IR (ROXICODONE) tablet 10 mg  10 mg Oral G9V PRN    folic acid (FOLVITE) tablet 1 mg  1 mg Oral DAILY    immune globulin 10% (FLEBOGAMMA DIF) infusion 20 g  20 g IntraVENous Q24H    immune globulin 10% (FLEBOGAMMA DIF) infusion 5 g  5 g IntraVENous Q24H    thiamine (B-1) 100 mg in 0.9% sodium chloride 50 mL IVPB  100 mg IntraVENous Q24H    acetaminophen (TYLENOL) tablet 650 mg  650 mg Oral Q4H PRN    scopolamine (TRANSDERM-SCOP) 1.5 mg  1.5 mg TransDERmal Q72H    QUEtiapine (SEROquel) tablet 50 mg  50 mg Oral QHS    citalopram (CELEXA) tablet 20 mg  20 mg Oral DAILY    benzocaine-menthol (CEPACOL) lozenge 1 Lozenge  1 Lozenge Mucous Membrane PRN    enoxaparin (LOVENOX) injection 40 mg  40 mg SubCUTAneous Q24H    ondansetron (ZOFRAN) injection 4 mg  4 mg IntraVENous Q4H PRN    LORazepam (ATIVAN) tablet 1 mg  1 mg Oral Q6H PRN    oxymetazoline (AFRIN) 0.05 % nasal spray 2 Spray  2 Spray Both Nostrils BID PRN    sodium chloride (NS) flush 5-10 mL  5-10 mL IntraVENous PRN           Tawny Bullard DO  Internal Medicine, Hospitalist  Pager: [phone]  [address] [place] [address] Drive [facility]

## 2017-05-06 NOTE — PROGRESS NOTES
ID- called by Dr. Cora Freitas with request to order CMV and Campylobacter tests. Confirmed with microb that routine stool culture includes Campylobacter. - orders placed for CMV serology and pcr, and stool culture.  [id number]

## 2017-05-06 NOTE — PROGRESS NOTES
1900> Assumed patient care.     2000> Patient assessed. Patient is lying in bed, resting. Patient is A&O x 4, follows commands. Patient was able to lift up arms below elbows off bed, patient waggle toes both legs but refused to move legs due to pain. Patient c/o generalized pain, see MAR for intervention. No further changes or complaints. Call bell within reach. Bed is locked in low position. Side rails up x 3. Will continue to monitor. 2055> Called MRI, technician is not there. According to phone call from Dr Billie Campos, MRI can be done at AM. Will call at morning to MRI.    0030> Called Dr Jesús Fajardo, patient produced 40 ml/ 4 hr of urine. Received oreder to give bolus 250 ml NS, and continues  ml/hr.    0300> Bedside and Verbal shift change report given to Gonzales Sánchez RN (oncoming nurse) by Elías Vilchis RN (offgoing nurse). Report included the following information SBAR, Kardex, Intake/Output, MAR and Recent Results.

## 2017-05-07 LAB
ALBUMIN SERPL BCP-MCNC: 2.4 G/DL (ref 3.4–5)
ALBUMIN/GLOB SERPL: 0.8 {RATIO} (ref 0.8–1.7)
ALP SERPL-CCNC: 72 U/L (ref 45–117)
ALT SERPL-CCNC: 99 U/L (ref 13–56)
ANION GAP BLD CALC-SCNC: 11 MMOL/L (ref 3–18)
AST SERPL W P-5'-P-CCNC: 57 U/L (ref 15–37)
BASOPHILS # BLD AUTO: 0 K/UL (ref 0–0.06)
BASOPHILS # BLD: 0 % (ref 0–2)
BILIRUB SERPL-MCNC: 0.8 MG/DL (ref 0.2–1)
BUN SERPL-MCNC: 6 MG/DL (ref 7–18)
BUN/CREAT SERPL: 18 (ref 12–20)
CALCIUM SERPL-MCNC: 7.8 MG/DL (ref 8.5–10.1)
CHLORIDE SERPL-SCNC: 112 MMOL/L (ref 100–108)
CMV IGG SERPL IA-ACNC: >10 U/ML (ref 0–0.59)
CMV IGM SERPL IA-ACNC: <30 AU/ML (ref 0–29.9)
CO2 SERPL-SCNC: 21 MMOL/L (ref 21–32)
CREAT SERPL-MCNC: 0.34 MG/DL (ref 0.6–1.3)
DIFFERENTIAL METHOD BLD: ABNORMAL
EOSINOPHIL # BLD: 0.1 K/UL (ref 0–0.4)
EOSINOPHIL NFR BLD: 1 % (ref 0–5)
ERYTHROCYTE [DISTWIDTH] IN BLOOD BY AUTOMATED COUNT: 14.4 % (ref 11.6–14.5)
GLOBULIN SER CALC-MCNC: 3.2 G/DL (ref 2–4)
GLUCOSE SERPL-MCNC: 128 MG/DL (ref 74–99)
HCT VFR BLD AUTO: 30.9 % (ref 35–45)
HGB BLD-MCNC: 11 G/DL (ref 12–16)
LYMPHOCYTES # BLD AUTO: 27 % (ref 21–52)
LYMPHOCYTES # BLD: 2 K/UL (ref 0.9–3.6)
MAGNESIUM SERPL-MCNC: 1.6 MG/DL (ref 1.6–2.6)
MCH RBC QN AUTO: 31 PG (ref 24–34)
MCHC RBC AUTO-ENTMCNC: 35.6 G/DL (ref 31–37)
MCV RBC AUTO: 87 FL (ref 74–97)
MONOCYTES # BLD: 0.6 K/UL (ref 0.05–1.2)
MONOCYTES NFR BLD AUTO: 8 % (ref 3–10)
NEUTS SEG # BLD: 4.6 K/UL (ref 1.8–8)
NEUTS SEG NFR BLD AUTO: 64 % (ref 40–73)
PLATELET # BLD AUTO: 354 K/UL (ref 135–420)
PMV BLD AUTO: 9.2 FL (ref 9.2–11.8)
POTASSIUM SERPL-SCNC: 3.4 MMOL/L (ref 3.5–5.5)
PROT SERPL-MCNC: 5.6 G/DL (ref 6.4–8.2)
RBC # BLD AUTO: 3.55 M/UL (ref 4.2–5.3)
SODIUM SERPL-SCNC: 144 MMOL/L (ref 136–145)
WBC # BLD AUTO: 7.2 K/UL (ref 4.6–13.2)

## 2017-05-07 PROCEDURE — 86703 HIV-1/HIV-2 1 RESULT ANTBDY: CPT | Performed by: HOSPITALIST

## 2017-05-07 PROCEDURE — A6209 FOAM DRSG <=16 SQ IN W/O BDR: HCPCS

## 2017-05-07 PROCEDURE — 65660000001 HC RM ICU INTERMED STEPDOWN

## 2017-05-07 PROCEDURE — 74011250636 HC RX REV CODE- 250/636: Performed by: HOSPITALIST

## 2017-05-07 PROCEDURE — 85025 COMPLETE CBC W/AUTO DIFF WBC: CPT | Performed by: HOSPITALIST

## 2017-05-07 PROCEDURE — 74011250637 HC RX REV CODE- 250/637: Performed by: HOSPITALIST

## 2017-05-07 PROCEDURE — 83735 ASSAY OF MAGNESIUM: CPT | Performed by: HOSPITALIST

## 2017-05-07 PROCEDURE — 87389 HIV-1 AG W/HIV-1&-2 AB AG IA: CPT | Performed by: INTERNAL MEDICINE

## 2017-05-07 PROCEDURE — 36415 COLL VENOUS BLD VENIPUNCTURE: CPT | Performed by: INTERNAL MEDICINE

## 2017-05-07 PROCEDURE — 74011250637 HC RX REV CODE- 250/637: Performed by: INTERNAL MEDICINE

## 2017-05-07 PROCEDURE — 80053 COMPREHEN METABOLIC PANEL: CPT | Performed by: HOSPITALIST

## 2017-05-07 PROCEDURE — 74011000258 HC RX REV CODE- 258: Performed by: PSYCHIATRY & NEUROLOGY

## 2017-05-07 PROCEDURE — 74011250636 HC RX REV CODE- 250/636: Performed by: FAMILY MEDICINE

## 2017-05-07 PROCEDURE — 74011250636 HC RX REV CODE- 250/636: Performed by: PSYCHIATRY & NEUROLOGY

## 2017-05-07 PROCEDURE — 36415 COLL VENOUS BLD VENIPUNCTURE: CPT | Performed by: HOSPITALIST

## 2017-05-07 PROCEDURE — 74011250637 HC RX REV CODE- 250/637: Performed by: PSYCHIATRY & NEUROLOGY

## 2017-05-07 PROCEDURE — 74011250636 HC RX REV CODE- 250/636

## 2017-05-07 RX ORDER — MAGNESIUM SULFATE HEPTAHYDRATE 500 MG/ML
2 INJECTION, SOLUTION INTRAMUSCULAR; INTRAVENOUS
Status: DISPENSED | OUTPATIENT
Start: 2017-05-07 | End: 2017-05-07

## 2017-05-07 RX ORDER — MAGNESIUM SULFATE HEPTAHYDRATE 40 MG/ML
INJECTION, SOLUTION INTRAVENOUS
Status: COMPLETED
Start: 2017-05-07 | End: 2017-05-08

## 2017-05-07 RX ADMIN — OXYCODONE HYDROCHLORIDE 10 MG: 5 TABLET ORAL at 00:59

## 2017-05-07 RX ADMIN — OXYCODONE HYDROCHLORIDE 10 MG: 5 TABLET ORAL at 15:19

## 2017-05-07 RX ADMIN — OXYCODONE HYDROCHLORIDE 10 MG: 5 TABLET ORAL at 11:08

## 2017-05-07 RX ADMIN — DEXTROSE MONOHYDRATE, SODIUM CHLORIDE, AND POTASSIUM CHLORIDE: 50; 9; 2.98 INJECTION, SOLUTION INTRAVENOUS at 18:23

## 2017-05-07 RX ADMIN — ENOXAPARIN SODIUM 40 MG: 40 INJECTION SUBCUTANEOUS at 05:31

## 2017-05-07 RX ADMIN — FOLIC ACID 1 MG: 1 TABLET ORAL at 08:32

## 2017-05-07 RX ADMIN — LORAZEPAM 1 MG: 1 TABLET ORAL at 17:34

## 2017-05-07 RX ADMIN — THIAMINE HYDROCHLORIDE 100 MG: 100 INJECTION, SOLUTION INTRAMUSCULAR; INTRAVENOUS at 17:19

## 2017-05-07 RX ADMIN — CITALOPRAM HYDROBROMIDE 20 MG: 20 TABLET ORAL at 08:32

## 2017-05-07 RX ADMIN — OXYCODONE HYDROCHLORIDE 10 MG: 5 TABLET ORAL at 21:19

## 2017-05-07 RX ADMIN — IMMUNE GLOBULIN INTRAVENOUS (HUMAN) 5 G: 5 INJECTION, SOLUTION INTRAVENOUS at 07:03

## 2017-05-07 RX ADMIN — IMMUNE GLOBULIN INTRAVENOUS (HUMAN) 20 G: 5 INJECTION, SOLUTION INTRAVENOUS at 08:34

## 2017-05-07 RX ADMIN — MAGNESIUM SULFATE HEPTAHYDRATE 2 G: 40 INJECTION, SOLUTION INTRAVENOUS at 15:23

## 2017-05-07 NOTE — PROGRESS NOTES
Neurology Progress Note      Patient: Sea Blanco MRN: [id number]  SSN: [id number]    YOB: 1960  Age: 60 y.o. Sex: female      Admit Date: 4/26/2017    LOS: 10 days     Subjective:     Chief Complaint:/problem list   weakness/Acute axonal polyneuropathy. Boyfriend at bedside and I clarified his story. He agreed with her that this generalized muscle weakness in new. He had informed other physicians that she has not been doing much for the last 2-3 years means not interested in doing anything and not that she was weak. S/p second dose of IVIG last night. She still feels getting weaker and \"swelled\". RT is monitoring her FVC, recent 1.5 L. CMV IgG was very elevated but IgM was negative. Objective:     Vitals:    05/07/17 1000 05/07/17 1030 05/07/17 1100 05/07/17 1200   BP: 142/83  157/89 143/80   Pulse: (!) 101 100 94 (!) 103   Resp: 29 25 28 26   Temp:    98.7 °F (37.1 °C)   SpO2: (!) 68% 96% 93% 96%   Weight:       Height:                Physical Exam:   Awake and oriented time three. PERRL. Motor exam: Still with significant generalized weakness L> U extremities: Strength in the upper: Deltoid 1/3 biceps and triceps 2-3/4.  and fingers abduction 2/5. In lower extremities: hip flexion 1/5, knees extension and flexion 1-2/5 and 2-/5 plantarflexion and dorsiflexion   DTR: Areflexia. Lab/Data Review: Labs reviewed. Review of systems: Denies headaches or chest pain.     Medications Reviewed:  Current Facility-Administered Medications   Medication Dose Route Frequency    immune globulin 10% infusion 20 g  20 g IntraVENous Q24H    magnesium sulfate injection 2 g  2 g IntraVENous NOW    dextrose 5% - 0.9% NaCl with KCl 40 mEq/L infusion   IntraVENous CONTINUOUS    senna (SENOKOT) tablet 17.2 mg  2 Tab Oral QHS    oxyCODONE IR (ROXICODONE) tablet 10 mg  10 mg Oral T2Z PRN    folic acid (FOLVITE) tablet 1 mg  1 mg Oral DAILY    immune globulin 10% (FLEBOGAMMA DIF) infusion 5 g  5 g IntraVENous Q24H    thiamine (B-1) 100 mg in 0.9% sodium chloride 50 mL IVPB  100 mg IntraVENous Q24H    acetaminophen (TYLENOL) tablet 650 mg  650 mg Oral Q4H PRN    scopolamine (TRANSDERM-SCOP) 1.5 mg  1.5 mg TransDERmal Q72H    QUEtiapine (SEROquel) tablet 50 mg  50 mg Oral QHS    citalopram (CELEXA) tablet 20 mg  20 mg Oral DAILY    benzocaine-menthol (CEPACOL) lozenge 1 Lozenge  1 Lozenge Mucous Membrane PRN    enoxaparin (LOVENOX) injection 40 mg  40 mg SubCUTAneous Q24H    ondansetron (ZOFRAN) injection 4 mg  4 mg IntraVENous Q4H PRN    LORazepam (ATIVAN) tablet 1 mg  1 mg Oral Q6H PRN    oxymetazoline (AFRIN) 0.05 % nasal spray 2 Spray  2 Spray Both Nostrils BID PRN    sodium chloride (NS) flush 5-10 mL  5-10 mL IntraVENous PRN     Past Medical History:   Diagnosis Date    Allergic rhinitis     Angina pectoris ([facility])     Anxiety     Bronchitis     Chronic fatigue syndrome     Chronic pain     COPD (chronic obstructive pulmonary disease) ([facility])     Generalized pain     Headache, tension-type     HTN (hypertension)     Hyperlipidemia     Hypokalemia     Insomnia     Leg swelling     Lyme disease     Nicotine dependence     OA (osteoarthritis)     RA (rheumatoid arthritis) ([facility])     Vertigo     Vitamin D deficiency      Social History     Social History    Marital status:      Spouse name: N/A    Number of children: N/A    Years of education: N/A     Occupational History    Not on file. Social History Main Topics    Smoking status: Current Every Day Smoker     Packs/day: 0.50     Years: 30.00    Smokeless tobacco: Never Used    Alcohol use No    Drug use: No    Sexual activity: Not on file     Other Topics Concern    Not on file     Social History Narrative         MRI Results (most recent):    Results from Hospital Encounter encounter on 04/26/17   MRI LUMB SPINE W WO CONT   Narrative          Impression IMPRESSION:  1.   Negative for imaging evidence of Aditya Hora syndrome and other acutecauda equina processes. Widely patent central spinal canal.  2.  Right greater than left posterior paraspinal muscle edema -- a commonfinding that often reflects either chronic denervation edema or acute/subacute muscle strain. 3.  Straightened lumbar lordosis with minor lumbar scoliosis. 4.  Potential for right L5 nerve root impingement in the extraforaminal zone. 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    Exam with sings of severe polyneuropathy with Areflexia and significant muscle weakness in LE > UE. CSF no cells but elevated protein.   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 Started on IVIG. Other test results for other etiologies are pending.      Cervical Spine MRI was cleared. Positive results: Elevated CSF protein with no cells. Elevated CMV IgG but not IgM. Pending Workup : urine proporphyrinogen spot. Serum: heavy metal serum,Lyme, B1, B6, HIV.                                    CSF: Cytology ,[place], [place]                                    Stool campyliobacter J culture. No Mosquito contact and no sexual relation with Boyfriend to be at risk for Zika virus. >Intersting finding on her nuvia MRI with asymmetric left hemispheric atrophy which raises the possibility of some cerebral degenerative disease but unrelated to her current situation. Plan:  > continue IVIG. (to complete 5 days )   > continue [id number] mg IV Qd.  I will extend for 4 more doses so till Thiamine level come back.  >continye folic acid 1 mg every day.  >continue stepdown monitoring with FVC periodic check.    > Discuss with pulmonary/intensavist .    Spent 40 minutes in face to face interview and management.                 Signed By: Jonah Ayala MD     May 7, 2017

## 2017-05-07 NOTE — PROGRESS NOTES
Internal Medicine Progress Note    Patient's Name: Aurea Mcgrath Date: 4/26/2017  Length of Stay: 10      Assessment/Plan     This is a 59 yo female w/ a PMHx of severe depression w/ suicide attempt, chronic pain, fibromyalgia, who was brought in by police after she was refusing to eat or drink for over 5 days and encephalopathic. She was complaining of sand in her mouth and objects floating in the air. She was found to have severe hypokalemia and volume depletion with a UTI. She was treated with IV potassium, IVFs and antibiotics. She was evaluated by psychiatry for possible conversion disorder and somatic delusions. Her weakness and inability to move began progressing during her admission and she was complaining of not being able to use her hands and that it was getting worse. She developed urinary retention as well. Neurology was consulted and she was believed to have a form of polyneuropathy. She had a lumbar puncture which showed albuminocytologic dissociation, consistent with Guillain Bristol Syndrome. EMG was obtained and consistent with axonal injury. She was transferred to the step down unit for closer monitoring and frequent checks on her vital capacity to assess for need to intubate. She was started on IVIG and is currently on her 2nd day of therapy. She continues to feel as if she is getting worse, although her hemodynamics remain stable. Pulmonology, Infectious Disease and Neurology are following the patient. 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 Awaiting multiple send out labs. Cont IVIG 2/5. Vital capacity checks. No indication to intubate at this time. PT/OT  - Completed full course of antibx. Observe off antibx  - Cont celexa, seroquel, ativan PRN. Still without complete psych diagnosis. - Lovenox    Subjective     Pt s/e @ bedside  No major events overnight  States she has been able to eat liquids  Still complains of diffuse pain and feels like her hands are getting worse  Denies CP or SOB    Objective     Visit Vitals    /80    Pulse (!) 103    Temp 98.7 °F (37.1 °C)    Resp 26    Ht 5' 4\" (1.626 m)    Wt 68.1 kg (150 lb 2.1 oz)    SpO2 96%    BMI 25.77 kg/m2       Physical Exam:  General Appearance: NAD, conversant  HEENT: Normocephalic, atraumatic  Neck: No JVD, supple  Lungs: CTA with normal respiratory effort  CV: RRR, no m/r/g  Abdomen: soft, non-tender, normal bowel sounds  Extremities: No edema, no cyanosis  Psych: Anxious, odd affect  Neuro: CN intact, moves all ext w/ no focal deficits but w/ difficulty     Lab/Data Reviewed:  BMP:   Lab Results   Component Value Date/Time     05/07/2017 03:15 AM    K 3.4 (L) 05/07/2017 03:15 AM     (H) 05/07/2017 03:15 AM    CO2 21 05/07/2017 03:15 AM    AGAP 11 05/07/2017 03:15 AM     (H) 05/07/2017 03:15 AM    BUN 6 (L) 05/07/2017 03:15 AM    CREA 0.34 (L) 05/07/2017 03:15 AM    GFRAA >60 05/07/2017 03:15 AM    GFRNA >60 05/07/2017 03:15 AM     CBC:   Lab Results   Component Value Date/Time    WBC 7.2 05/07/2017 03:15 AM    HGB 11.0 (L) 05/07/2017 03:15 AM    HCT 30.9 (L) 05/07/2017 03:15 AM     05/07/2017 03:15 AM       Imaging Reviewed:  No results found.     Medications Reviewed:  Current Facility-Administered Medications   Medication Dose Route Frequency    immune globulin 10% infusion 20 g  20 g IntraVENous Q24H    magnesium sulfate injection 2 g  2 g IntraVENous NOW    dextrose 5% - 0.9% NaCl with KCl 40 mEq/L infusion   IntraVENous CONTINUOUS    senna (SENOKOT) tablet 17.2 mg  2 Tab Oral QHS    oxyCODONE IR (ROXICODONE) tablet 10 mg  10 mg Oral L0G PRN    folic acid (FOLVITE) tablet 1 mg  1 mg Oral DAILY    immune globulin 10% (FLEBOGAMMA DIF) infusion 5 g  5 g IntraVENous Q24H    thiamine (B-1) 100 mg in 0.9% sodium chloride 50 mL IVPB  100 mg IntraVENous Q24H    acetaminophen (TYLENOL) tablet 650 mg  650 mg Oral Q4H PRN    scopolamine (TRANSDERM-SCOP) 1.5 mg  1.5 mg TransDERmal Q72H    QUEtiapine (SEROquel) tablet 50 mg  50 mg Oral QHS    citalopram (CELEXA) tablet 20 mg  20 mg Oral DAILY    benzocaine-menthol (CEPACOL) lozenge 1 Lozenge  1 Lozenge Mucous Membrane PRN    enoxaparin (LOVENOX) injection 40 mg  40 mg SubCUTAneous Q24H    ondansetron (ZOFRAN) injection 4 mg  4 mg IntraVENous Q4H PRN    LORazepam (ATIVAN) tablet 1 mg  1 mg Oral Q6H PRN    oxymetazoline (AFRIN) 0.05 % nasal spray 2 Spray  2 Spray Both Nostrils BID PRN    sodium chloride (NS) flush 5-10 mL  5-10 mL IntraVENous PRN           Katerine Vidal DO  Internal Medicine, Hospitalist  Pager: [phone]  [address] [place] [address] [facility]

## 2017-05-07 NOTE — PROGRESS NOTES
FVC done christie patient - patient sitting up in bed, awake and alert - FVC = .8     0336 - FVC done = FVC = .75

## 2017-05-07 NOTE — PROGRESS NOTES
Progress note    HISTORY OF PRESENT ILLNESS: This is a 42-year-old lady who has  been complaining of weakness that is chronic. Her boyfriend who has  been living with her for 18 years has been telling me it has been  gradual, slow progression of weakness over the last 2 years. Then she  reached a stage where she had no control on her sphincters and that  is why he brought her here. The patient apparently stopped eating and  drinking for about a week prior to presentation because of bad taste in  her mouth. In the ED the patient was also found to be hypokalemic at  1.9, and that was replaced. She was transferred with a suspicion of  Guillain-[place] to the ICU. PAST MEDICAL HISTORY: Includes allergic rhinitis, angina pectoris,  anxiety, chronic fatigue syndrome, chronic pain, COPD, hypertension,  hyperlipidemia, hypokalemia, leg swelling, lung disease. PAST SURGICAL HISTORY: Includes bunionectomy, left foot. MEDICATION: Her medication includes:  1. Atenolol. 2. Celexa. 3. Clonazepam.  4. Famotidine. 5. Claritin. SOCIAL HISTORY: She is . She lives with her boyfriend. She  has history of smoking for 30 years, but no alcohol or illicit drug use. PHYSICAL EXAMINATION:  GENERAL: When I examined the patient, patient was comfortable. She has no difficulty in breathing and she was alert and oriented. She  was generally weak. VITAL SIGNS: Her vital signs showed that her temperature was 97,  heart rate was 89, respiratory rate was 35, blood pressure was 110/74,  oxygen saturation was 99. DATA: Her CBC was within normal limits, but with a low potassium. Her chemistry showed, apart from low potassium, everything else was  within normal.    Her chest x-ray showed no acute cardiopulmonary process. Patient had CSF analysis, showed high protein and no cells. Her MRI of the brain showed atrophy with mild to moderate chronic  small vessel changes.  MRI of the lumbar spine was negative for cauda  equina process. ASSESSMENT AND PLAN:  1. Generalized weakness with hypokalemia. K is better  The history is very  chronic. It is not very typical of Guillain-[place] unless it is the chronic variant  . I think the differential  diagnosis should include heavy metal poisoning versus  hypothyroidism. Her TSH, however, was 1.84. We need to do her TRH  To  make sure that her pituitary axis is normal. I have discussed with the  neurologist the possibility of heavy metal poisoning, and there was an  order for heavy metal screening, particularly lead poisoning. 2. From respiratory point of view the patient currently is not in distress,  Hypokalemia paralysis is in the differential    and because the process is very chronic, has been going on for over  the last 2 years, I do not anticipate very immediate respiratory  compromise. However, monitoring her closely is a good idea with daily NIFs  Obtaining pulmonary function  test on her would be of value when she is able and willing . However, there is no immediate threat  for her respiratory system now, but again close observation is  advisable and recommended. will continue close observation in the ICU for now

## 2017-05-07 NOTE — ROUTINE PROCESS
Bedside and Verbal shift change report given to Balwinder López RN (oncoming nurse) by Pratik Doe RN (offgoing nurse). Report included the following information SBAR, MAR, Recent Results and Med Rec Status. 2100 - shift assessment performed. Patient has very limited movement on her legs and arms. Her  is very weak bilaterally. 0100 - Bladder scanned the pt, as per Dr. Mariano Morgan order. Patient retaining 178 mL of urine at this time (no need for straight cath). Bedside and Verbal shift change report given to Lanie Graves RN (oncoming nurse) by Balwinder López RN (offgoing nurse). Report included the following information SBAR and Intake/Output.

## 2017-05-07 NOTE — PROGRESS NOTES
Rec'd pt resting in bed , currently post enema. IV site red at a/c area with pump beeping, sluggish to flush. 20:00 -21:00  Pt completed large BN post enema. Pt turned, cleaned, linen replaced. Applied new mepilex to sacrum. Skin intact. VSS. Current IV d/c'd - restarted # 20 site to right arm - pt tolerated no difficulties. 22:30  Notified pharmacy unable to find pts IGG. When reviewed did not look 20 G vial was dispatched. Attempted to get couried over - this drug is not available at [facility] to be shipped here. 23:00  Continue to turn and reposition pt Q 2 hour when pt agreeable. 0200  Remains on room air with sats average 98.        0400 Attempted to turn/ partial bath pt refused at this time. 0700  Pt incont of large liquid stool. Bathed, linen replaced. Tolerated with minimal difficulty.

## 2017-05-07 NOTE — CONSULTS
[facility]    Name:  Ivan Fernández  MR#:  [id number]  :  1960  Account #:  [de-identified]  Date of Adm:  2017  Date of Consultation:  2017      HISTORY OF PRESENT ILLNESS: This is a 49-year-old lady who has  been complaining of weakness that is chronic. Her boyfriend who has  been living with her for 18 years has been telling me it has been  gradual, slow progression of weakness over the last 2 years. Then she  reached a stage where she had no control on her sphincters and that  is why he brought her here. The patient apparently stopped eating and  drinking for about a week prior to presentation because of bad taste in  her mouth. In the ED the patient was also found to be hypokalemic at  1.9, and that was replaced. She was transferred with a suspicion of  Guillain-[place] to the ICU. PAST MEDICAL HISTORY: Includes allergic rhinitis, angina pectoris,  anxiety, chronic fatigue syndrome, chronic pain, COPD, hypertension,  hyperlipidemia, hypokalemia, leg swelling, lung disease. PAST SURGICAL HISTORY: Includes bunionectomy, left foot. MEDICATION: Her medication includes:  1. Atenolol. 2. Celexa. 3. Clonazepam.  4. Famotidine. 5. Claritin. SOCIAL HISTORY: She is . She lives with her boyfriend. She  has history of smoking for 30 years, but no alcohol or illicit drug use. PHYSICAL EXAMINATION:  GENERAL: When I examined the patient, patient was comfortable. She has no difficulty in breathing and she was alert and oriented. She  was generally weak. VITAL SIGNS: Her vital signs showed that her temperature was 97,  heart rate was 89, respiratory rate was 35, blood pressure was 110/74,  oxygen saturation was 99. DATA: Her CBC was within normal limits, but with a low potassium. Her chemistry showed, apart from low potassium, everything else was  within normal.    Her chest x-ray showed no acute cardiopulmonary process.     Patient had CSF analysis, showed high protein and no cells. Her MRI of the brain showed atrophy with mild to moderate chronic  small vessel changes. MRI of the lumbar spine was negative for cauda  equina process. ASSESSMENT AND PLAN:  1. Generalized weakness with hypokalemia. The history is very  chronic. It is not very typical of Guillain-[place] unless it is the chronic variant . I think the differential  diagnosis should include heavy metal poisoning versus  hypothyroidism. Her TSH, however, was 1.84. We need to do her  TRH  to  make sure that her pituitary axis is normal. I have discussed with the  neurologist the possibility of heavy metal poisoning, and there was an  order for heavy metal screening, particularly lead poisoning. Hypokalemia paralysis is in the differential   2. From respiratory point of view the patient currently is not in distress,  and because the process is very chronic, has been going on for over  the last 2 years, I do not anticipate very immediate respiratory  compromise. However, monitoring her closely is a good idea and I do  recommend it till a diagnosis is made. Obtaining pulmonary function  Is recommended when is franklyn and willing  However, there is no immediate threat  for her respiratory system now, but again close observation is  advisable and recommended. will closely observe her in the ICU     Thank you very much for the consultation. MD Aaron Mar / Sapna Licona  D:  05/06/2017   16:39  T:  05/06/2017   22:15  Job #:  [id number]

## 2017-05-07 NOTE — PROGRESS NOTES
Assumed care of patient from [facility], RN. Received patient in bed, resting quietly. Patient Alert & Oriented x 4, just received pain medication from previous nurse. On RA. HOB elevated, side rails up x 3. Pt instructed to call for any needs, call bell within reach; pt verbalized understanding. Bed in low position with wheels locked. See shift assessment for further information concerning patient. 1230-Patient complaining of blood pressure cuff hurting when it takes blood pressure. Screaming in excruciating pain and demanding it to be taken off.      1600- Patient complaining of blood pressure cuff hurting when it takes blood pressure. Screaming in excruciating pain and demanding it to be taken off.       1915- Bedside and Verbal shift change report given to Yasmin Cox RN (oncoming nurse) by Yan Montgomery RN   (offgoing nurse). Report included the following information SBAR, Kardex, Intake/Output, MAR, Recent Results and Cardiac Rhythm NSR.

## 2017-05-07 NOTE — ROUTINE PROCESS
0800 .Bedside and Verbal shift change report given to Nataly Sarabia (oncoming nurse) by Nuha Rincon (offgoing nurse). Report included the following information SBAR, Kardex and MAR. Performed shift assessment   Patient had a bowel movement   1130. Bedside and Verbal shift change report given to Renetta (oncoming nurse) by Nataly Sarabia (offgoing nurse). Report included the following information SBAR, Kardex and MAR.

## 2017-05-08 LAB
ALBUMIN SERPL BCP-MCNC: 2.4 G/DL (ref 3.4–5)
ALBUMIN/GLOB SERPL: 0.7 {RATIO} (ref 0.8–1.7)
ALP SERPL-CCNC: 72 U/L (ref 45–117)
ALT SERPL-CCNC: 82 U/L (ref 13–56)
ANION GAP BLD CALC-SCNC: 11 MMOL/L (ref 3–18)
AST SERPL W P-5'-P-CCNC: 44 U/L (ref 15–37)
B BURGDOR IGG PATRN CSF IB-IMP: POSITIVE
B BURGDOR IGM PATRN CSF IB-IMP: NEGATIVE
B BURGDOR18KD IGG CSF QL IB: PRESENT
B BURGDOR23KD IGG CSF QL IB: ABNORMAL
B BURGDOR23KD IGM CSF QL IB: ABNORMAL
B BURGDOR28KD IGG CSF QL IB: ABNORMAL
B BURGDOR30KD IGG CSF QL IB: ABNORMAL
B BURGDOR39KD IGG CSF QL IB: PRESENT
B BURGDOR39KD IGM CSF QL IB: ABNORMAL
B BURGDOR41KD IGG CSF QL IB: ABNORMAL
B BURGDOR41KD IGM CSF QL IB: ABNORMAL
B BURGDOR45KD IGG CSF QL IB: PRESENT
B BURGDOR58KD IGG CSF QL IB: PRESENT
B BURGDOR66KD IGG CSF QL IB: PRESENT
B BURGDOR93KD IGG CSF QL IB: ABNORMAL
BASOPHILS # BLD AUTO: 0 K/UL (ref 0–0.06)
BASOPHILS # BLD: 0 % (ref 0–2)
BILIRUB SERPL-MCNC: 0.9 MG/DL (ref 0.2–1)
BUN SERPL-MCNC: 1 MG/DL (ref 7–18)
BUN/CREAT SERPL: 3 (ref 12–20)
CALCIUM SERPL-MCNC: 8 MG/DL (ref 8.5–10.1)
CHLORIDE SERPL-SCNC: 111 MMOL/L (ref 100–108)
CO2 SERPL-SCNC: 21 MMOL/L (ref 21–32)
CREAT SERPL-MCNC: 0.32 MG/DL (ref 0.6–1.3)
DIFFERENTIAL METHOD BLD: ABNORMAL
EOSINOPHIL # BLD: 0 K/UL (ref 0–0.4)
EOSINOPHIL NFR BLD: 1 % (ref 0–5)
ERYTHROCYTE [DISTWIDTH] IN BLOOD BY AUTOMATED COUNT: 15.4 % (ref 11.6–14.5)
GLOBULIN SER CALC-MCNC: 3.5 G/DL (ref 2–4)
GLUCOSE SERPL-MCNC: 79 MG/DL (ref 74–99)
HCT VFR BLD AUTO: 32 % (ref 35–45)
HGB BLD-MCNC: 11.3 G/DL (ref 12–16)
HIV 1+2 AB+HIV1 P24 AG SERPL QL IA: NONREACTIVE
HIV12 RESULT COMMENT, HHIVC: NORMAL
LYMPHOCYTES # BLD AUTO: 36 % (ref 21–52)
LYMPHOCYTES # BLD: 1.7 K/UL (ref 0.9–3.6)
MAGNESIUM SERPL-MCNC: 1.7 MG/DL (ref 1.6–2.6)
MCH RBC QN AUTO: 30.7 PG (ref 24–34)
MCHC RBC AUTO-ENTMCNC: 35.3 G/DL (ref 31–37)
MCV RBC AUTO: 87 FL (ref 74–97)
MONOCYTES # BLD: 0.3 K/UL (ref 0.05–1.2)
MONOCYTES NFR BLD AUTO: 7 % (ref 3–10)
NEUTS SEG # BLD: 2.6 K/UL (ref 1.8–8)
NEUTS SEG NFR BLD AUTO: 56 % (ref 40–73)
PBG UR-MCNC: 1.4 MG/L (ref 0–2)
PHOSPHATE SERPL-MCNC: 3.3 MG/DL (ref 2.5–4.9)
PLATELET # BLD AUTO: 398 K/UL (ref 135–420)
PMV BLD AUTO: 9.4 FL (ref 9.2–11.8)
POTASSIUM SERPL-SCNC: 3.6 MMOL/L (ref 3.5–5.5)
PROT SERPL-MCNC: 5.9 G/DL (ref 6.4–8.2)
RBC # BLD AUTO: 3.68 M/UL (ref 4.2–5.3)
SODIUM SERPL-SCNC: 143 MMOL/L (ref 136–145)
VIT B1 BLD-SCNC: 31.1 NMOL/L (ref 66.5–200)
WBC # BLD AUTO: 4.6 K/UL (ref 4.6–13.2)

## 2017-05-08 PROCEDURE — 80053 COMPREHEN METABOLIC PANEL: CPT | Performed by: PHYSICIAN ASSISTANT

## 2017-05-08 PROCEDURE — 74011250636 HC RX REV CODE- 250/636: Performed by: PSYCHIATRY & NEUROLOGY

## 2017-05-08 PROCEDURE — 97168 OT RE-EVAL EST PLAN CARE: CPT

## 2017-05-08 PROCEDURE — 73060999999 HC MISC LAB CHARGE

## 2017-05-08 PROCEDURE — 74011250637 HC RX REV CODE- 250/637: Performed by: PHYSICIAN ASSISTANT

## 2017-05-08 PROCEDURE — 84181 WESTERN BLOT TEST: CPT

## 2017-05-08 PROCEDURE — 84100 ASSAY OF PHOSPHORUS: CPT | Performed by: PHYSICIAN ASSISTANT

## 2017-05-08 PROCEDURE — 83735 ASSAY OF MAGNESIUM: CPT | Performed by: PHYSICIAN ASSISTANT

## 2017-05-08 PROCEDURE — 74011250636 HC RX REV CODE- 250/636: Performed by: PHYSICIAN ASSISTANT

## 2017-05-08 PROCEDURE — 74011250637 HC RX REV CODE- 250/637: Performed by: INTERNAL MEDICINE

## 2017-05-08 PROCEDURE — 85025 COMPLETE CBC W/AUTO DIFF WBC: CPT | Performed by: PHYSICIAN ASSISTANT

## 2017-05-08 PROCEDURE — 74011250637 HC RX REV CODE- 250/637: Performed by: HOSPITALIST

## 2017-05-08 PROCEDURE — 84182 PROTEIN WESTERN BLOT TEST: CPT

## 2017-05-08 PROCEDURE — 74011250636 HC RX REV CODE- 250/636: Performed by: HOSPITALIST

## 2017-05-08 PROCEDURE — 74011000258 HC RX REV CODE- 258: Performed by: PSYCHIATRY & NEUROLOGY

## 2017-05-08 PROCEDURE — 74011250637 HC RX REV CODE- 250/637: Performed by: PSYCHIATRY & NEUROLOGY

## 2017-05-08 PROCEDURE — 74011250636 HC RX REV CODE- 250/636: Performed by: FAMILY MEDICINE

## 2017-05-08 PROCEDURE — 83516 IMMUNOASSAY NONANTIBODY: CPT

## 2017-05-08 PROCEDURE — 65660000001 HC RM ICU INTERMED STEPDOWN

## 2017-05-08 RX ORDER — POTASSIUM CHLORIDE 7.45 MG/ML
10 INJECTION INTRAVENOUS
Status: DISCONTINUED | OUTPATIENT
Start: 2017-05-08 | End: 2017-05-08

## 2017-05-08 RX ORDER — MAGNESIUM SULFATE 1 G/100ML
1 INJECTION INTRAVENOUS ONCE
Status: COMPLETED | OUTPATIENT
Start: 2017-05-08 | End: 2017-05-08

## 2017-05-08 RX ORDER — GABAPENTIN 300 MG/1
300 CAPSULE ORAL 3 TIMES DAILY
Status: DISCONTINUED | OUTPATIENT
Start: 2017-05-08 | End: 2017-05-10

## 2017-05-08 RX ORDER — POTASSIUM CHLORIDE 20 MEQ/1
40 TABLET, EXTENDED RELEASE ORAL ONCE
Status: COMPLETED | OUTPATIENT
Start: 2017-05-08 | End: 2017-05-08

## 2017-05-08 RX ADMIN — GABAPENTIN 300 MG: 300 CAPSULE ORAL at 17:44

## 2017-05-08 RX ADMIN — GABAPENTIN 300 MG: 300 CAPSULE ORAL at 21:40

## 2017-05-08 RX ADMIN — DEXTROSE MONOHYDRATE, SODIUM CHLORIDE, AND POTASSIUM CHLORIDE: 50; 9; 2.98 INJECTION, SOLUTION INTRAVENOUS at 03:14

## 2017-05-08 RX ADMIN — SENNOSIDES 17.2 MG: 8.6 TABLET, FILM COATED ORAL at 21:26

## 2017-05-08 RX ADMIN — POTASSIUM CHLORIDE 40 MEQ: 20 TABLET, EXTENDED RELEASE ORAL at 19:00

## 2017-05-08 RX ADMIN — FOLIC ACID 1 MG: 1 TABLET ORAL at 08:50

## 2017-05-08 RX ADMIN — IMMUNE GLOBULIN INTRAVENOUS (HUMAN) 5 G: 5 INJECTION, SOLUTION INTRAVENOUS at 07:55

## 2017-05-08 RX ADMIN — OXYCODONE HYDROCHLORIDE 10 MG: 5 TABLET ORAL at 03:14

## 2017-05-08 RX ADMIN — THIAMINE HYDROCHLORIDE 100 MG: 100 INJECTION, SOLUTION INTRAMUSCULAR; INTRAVENOUS at 18:25

## 2017-05-08 RX ADMIN — DEXTROSE MONOHYDRATE, SODIUM CHLORIDE, AND POTASSIUM CHLORIDE: 50; 9; 2.98 INJECTION, SOLUTION INTRAVENOUS at 17:45

## 2017-05-08 RX ADMIN — OXYCODONE HYDROCHLORIDE 10 MG: 5 TABLET ORAL at 08:52

## 2017-05-08 RX ADMIN — CITALOPRAM HYDROBROMIDE 20 MG: 20 TABLET ORAL at 08:50

## 2017-05-08 RX ADMIN — MAGNESIUM SULFATE HEPTAHYDRATE 1 G: 1 INJECTION, SOLUTION INTRAVENOUS at 15:33

## 2017-05-08 RX ADMIN — QUETIAPINE FUMARATE 50 MG: 25 TABLET, FILM COATED ORAL at 00:35

## 2017-05-08 RX ADMIN — OXYCODONE HYDROCHLORIDE 10 MG: 5 TABLET ORAL at 13:15

## 2017-05-08 RX ADMIN — IMMUNE GLOBULIN INTRAVENOUS (HUMAN) 20 G: 5 INJECTION, SOLUTION INTRAVENOUS at 12:23

## 2017-05-08 RX ADMIN — QUETIAPINE FUMARATE 50 MG: 25 TABLET, FILM COATED ORAL at 21:40

## 2017-05-08 RX ADMIN — ENOXAPARIN SODIUM 40 MG: 40 INJECTION SUBCUTANEOUS at 05:00

## 2017-05-08 NOTE — PROGRESS NOTES
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  - suspect precipitating factor was acute viral pharyngitis (7 days PTA) No recent flu-like illness, diarrheal episodes, no recent vaccinations, no recent travel  - currently no evidence of pharyngeal inflammation or any other active infection -> IVIG per Neurology  -> no antibacterial therapy indicated at this time  -> send stool cx which will cover campylobacter   -> CMV IgG sig elevated but neg IgM- s/o old disease  -> await Lyme serology ( pt richar was treated for same in 2002)   Severe Hypokalemia on admission  - due to poor po intake  - corrected     Abnormal CSF  - elevated protein, normal glucose without leukorrhachia  - c/w GBS - CSF studies not s/o infection but await Cx   Abnormal LFT's  - has diffuse hepatic steatosis on CT +/- dehydration  - transaminases, alk phos improving - Slightly better   HTN      COPD     OA/ RA     Hyperlipidemia     ? Chronic Fatigue Syndrome     h/o Lyme Disease  - dx'd ~ 2001 rx'd w/ 2 1-month courses doxycyline 2004 and 2006 and subsequently w/ multiple persistent complaints.  multiple + IgG western blots and negative IgM western blots dating back to 2003 (per Dr. Britney Castro - [place] [facility], 2011: no further treatment recommended)        MICROBIOLOGY:   4/26 blcx NG x 2          urcx  NG  MRSA scrn neg  5/5 CSF cx IP     LINES AND CATHETERS:   piv     MEDICATIONS:     Current Facility-Administered Medications   Medication Dose Route Frequency    immune globulin 10% infusion 20 g  20 g IntraVENous Q24H    dextrose 5% - 0.9% NaCl with KCl 40 mEq/L infusion   IntraVENous CONTINUOUS    senna (SENOKOT) tablet 17.2 mg  2 Tab Oral QHS    oxyCODONE IR (ROXICODONE) tablet 10 mg  10 mg Oral T9A PRN    folic acid (FOLVITE) tablet 1 mg  1 mg Oral DAILY    immune globulin 10% (FLEBOGAMMA DIF) infusion 5 g  5 g IntraVENous Q24H    thiamine (B-1) 100 mg in 0.9% sodium chloride 50 mL IVPB  100 mg IntraVENous Q24H    acetaminophen (TYLENOL) tablet 650 mg  650 mg Oral Q4H PRN    scopolamine (TRANSDERM-SCOP) 1.5 mg  1.5 mg TransDERmal Q72H    QUEtiapine (SEROquel) tablet 50 mg  50 mg Oral QHS    citalopram (CELEXA) tablet 20 mg  20 mg Oral DAILY    benzocaine-menthol (CEPACOL) lozenge 1 Lozenge  1 Lozenge Mucous Membrane PRN    enoxaparin (LOVENOX) injection 40 mg  40 mg SubCUTAneous Q24H    ondansetron (ZOFRAN) injection 4 mg  4 mg IntraVENous Q4H PRN    LORazepam (ATIVAN) tablet 1 mg  1 mg Oral Q6H PRN    oxymetazoline (AFRIN) 0.05 % nasal spray 2 Spray  2 Spray Both Nostrils BID PRN    sodium chloride (NS) flush 5-10 mL  5-10 mL IntraVENous PRN       SUBJECTIVE :     Interval notes reviewed. Afebrile. Feels tired and very sleepy. Says was able to have soup though some difficulty. Boyfriend at bedside who noticed some improvement in hand and legs movement. OBJECTIVE     Visit Vitals    BP (!) 153/92    Pulse (!) 104    Temp 100.1 °F (37.8 °C)    Resp 22    Ht 5' 4\" (1.626 m)    Wt 68.1 kg (150 lb 2.1 oz)    SpO2 98%    BMI 25.77 kg/m2       Temp (24hrs), Av.8 °F (37.1 °C), Min:98.2 °F (36.8 °C), Max:100.1 °F (37.8 °C)      Gen-No distress  HENT- No thrush  Chest- Symmetric expansion, CTA  CV-S1S2 RR, No JVD, No edema  Abd-Soft, NT, ND, BS+  Skin-No jaundice, cyanosis, clubbing. No decubitus  Neuro: awake and oriented, bilateral upper and lower extremity weakness, no NR.       Labs: Results:   Chemistry Recent Labs      17   0315  17   0430   GLU  128*  69*   NA  144  136   K  3.4*  3.3*   CL  112*  102   CO2  21  21   BUN  6*  7   CREA  0.34*  0.33*   CA  7.8*  8.9   AGAP  11  13   BUCR  18  21*   AP  72  84   TP  5.6*  6.6   ALB  2.4*  2.8*   GLOB  3.2  3.8   AGRAT  0.8  0.7*      CBC w/Diff Recent Labs      17 4268  05/07/17   0315  05/06/17   0430   WBC  4.6  7.2  7.7   RBC  3.68*  3.55*  4.13*   HGB  11.3*  11.0*  12.8   HCT  32.0*  30.9*  35.7   PLT  398  354  314   GRANS  56  64  68   LYMPH  36  27  24   EOS  1  1  1          RADIOLOGY :          Imaging    Results from [place] encounter on 04/26/17   XR SPINAL PUNC LUMB DX   Narrative History:Guillian [place]    COMPARISON: MRI brain 4/29/2017. Informed consent and utilizing sterile technique and local anesthetic a  diagnostic lumbar puncture was performed under fluoroscopy utilizing a posterior  midline approach at L2-3. A 20-gauge needle was used. 4 tubes of clear CSF  forwarded to laboratory. No complications. Timeout performed at 0908. Fluoro time:  3.7 minutes  Radiation dose: (Reference air kerma): 162.89 mGy    Fluoroscopy was used for guidance to document position of the needle with images  saved under fluoroscopy. Impression IMPRESSION:  1. Diagnostic fluoroscopic lumbar puncture performed without complication. Patient transported back to the brower. Results from [place] encounter on 04/26/17   CT ABD PELV W CONT   Narrative CT OF THE ABDOMEN AND PELVIS, WITH CONTRAST    INDICATION: Upper abdominal pain and abnormal LFTs    COMPARISON: 8/8/2016. TECHNIQUE: Standard helical CT performed through the abdomen and pelvis with IV  contrast.  Coronal and sagittal reformations were generated. One or more dose reduction techniques were used on this CT: automated exposure  control, adjustment of the mAs and/or kVp according to patient's size, and  iterative reconstruction techniques. The specific techniques utilized on this CT  exam have been documented in the patient's electronic medical record.    ============    FINDINGS:    INFERIOR THORAX: Moderate right and mild left dependent atelectasis. No acute  pulmonary infiltrate. Mild global cardiomegaly. No pericardial effusion.     LIVER/BILIARY: Diffuse hepatic steatosis. No suspicious liver lesion. No biliary  dilation. Punctate gallstones with no secondary findings of cholecystitis. SPLEEN: Normal.    PANCREAS: Normal.    ADRENALS: Normal.    KIDNEYS: Exophytic right upper pole renal cyst. No acute or suspicious renal  abnormality. Kriss Dellen LYMPH NODES: No mesenteric or retroperitoneal lymphadenopathy. GI TRACT: No wall thickening or dilation. Small hiatal hernia. Normal appendix. Scattered colonic diverticulosis, with no findings of an acute diverticulitis. VASCULATURE: Normal caliber distal thoracic and abdominal aorta. PELVIC ORGANS: Haley catheter is present within the nondistended bladder. Heterogeneously enhancing endometrial complex. BONES: No acute osseous abnormality. Degenerative changes of the thoracic spine. OTHER: No ascites or free intraperitoneal air.    ============         Impression IMPRESSION:    No acute or focal abnormality to explain patient's abdominal pain. There is  diffuse hepatic steatosis. Cholelithiasis. Colonic diverticulosis with no  findings of an acute diverticulitis. Note: Preliminary report sent to the Emergency Department by the radiology  resident at the time of the study. I have independently examined the patient and reviewed all lab studies and imgaing as well as review of nursing notes and physican notes from the past 24 hours. The plan of care has been discussed with the patient/relative and all questions are answered.      Stoney Guillen MD  May 8, 2017    [facility] AT THE [facility]  [phone]

## 2017-05-08 NOTE — PROGRESS NOTES
Problem: Self Care Deficits Care Plan (Adult)  Goal: *Acute Goals and Plan of Care (Insert Text)  Occupational Therapy Goals  Initiated 4/29/2017; re-evaluated on 5/8/2017 within 7 day(s). 1. Patient will perform self-feeding with minimal assistance/contact guard assist, adaptive strategies prn.   2. Patient will perform grooming with minimal assistance/contact guard assist.  3. Patient will perform upper body dressing with minimal assistance/contact guard assist.  4. Patient will perform functional task for 3 minutes while seated on EOB with mod assist for balance. 5. Patient will participate in upper extremity therapeutic exercise/activities with minimal assistance/contact guard assist for 8 minutes to increase strength/endurance for ADLs. 6. Patient will perform functional task with minimal assistance utilizing adaptive strategies, prn, to maintain function for ADLs. Outcome: Progressing Towards Goal  OCCUPATIONAL THERAPY REEVALUATION     Patient: Lyudmila Stephens (65 y.o. female)  Date: 5/8/2017  Diagnosis: Hypokalemia  UTI (urinary tract infection)  Dehydration  Encephalopathy  Polycythemia (HCC)  Elevated liver enzymes  Lactic acidosis  Leukocytosis Encephalopathy       Precautions: Fall      ASSESSMENT :  Based on the objective data described below, the patient presents with continued impairments with regard to participation in bed mobility and self care tasks secondary to increased weakness in [id number] [place] [address] Road. Pt sleeping on arrival; wakes to voice and tactile stimulation. Pt c/o 10/10 pain in BUEs; pt wincing when therapist touches both hands during PROM. During MMT, approx 1/5 deltoids, 3/5 biceps (pt able to bring hands to mouth against gravity), however, requires assistance to bring cup/utensils to mouth in preparation for self-feeding. Pt able to push call bell with additional time (secondary to pain in hands). Pt educated on continued POC; she verbalized understanding.  Mandie Bailey RN updated on session and verbalized understanding. Patient will benefit from skilled intervention to address the above impairments. Patients rehabilitation potential is considered to be Good  Factors which may influence rehabilitation potential include:   [ ]                None noted  [ ]                Mental ability/status  [X]                Medical condition  [ ]                Home/family situation and support systems  [ ]                Safety awareness  [ ]                Pain tolerance/management  [ ]                Other:           PLAN :  Recommendations and Planned Interventions:  [X]                  Self Care Training                  [X]           Therapeutic Activities  [X]                  Functional Mobility Training    [ ]           Cognitive Retraining  [X]                  Therapeutic Exercises           [X]           Endurance Activities  [X]                  Balance Training                   [ ]           Neuromuscular Re-Education  [ ]                  Visual/Perceptual Training     [X]      Home Safety Training  [X]                  Patient Education                 [X]           Family Training/Education  [ ]                  Other (comment):     Frequency/Duration: Patient will be followed by occupational therapy 3-5 times a week to address goals. Discharge Recommendations: SNF  Further Equipment Recommendations for Discharge: bedside commode, shower chair and N/A       SUBJECTIVE:   Patient stated I'm in so much pain. \"      OBJECTIVE DATA SUMMARY:   Hospital course since last seen and reason for reevaluation: Pt re-evaluated since initial admission. Pt continues to require max A for most self care tasks secondary to increased weakness of BUEs. Pt c/o tingling and pain in both hands this session. Pt able to bring drink/utensils to mouth with assistance for weakness. G CODES:  Self Care [id number] Current  CL= 60-79%  [id number] Goal  CJ= 20-39%.   The severity rating is based on the Other Functional Assessment, MMT, ROM      Eval Complexity: History: MEDIUM Complexity : Expanded review of history including physical, cognitive and psychosocial  history ; Examination: MEDIUM Complexity : 3-5 performance deficits relating to physical, cognitive , or psychosocial skils that result in activity limitations and / or participation restrictions; Decision Making:MEDIUM Complexity : Patient may present with comorbidities that affect occupational performnce. Miniml to moderate modification of tasks or assistance (eg, physical or verbal ) with assesment(s) is necessary to enable patient to complete evaluation      Cognitive/Behavioral Status:  Neurologic State: Drowsy  Orientation Level: Oriented X4  Cognition: Follows commands  Safety/Judgement: Fall prevention, Awareness of environment      Skin: Intact (BUEs)  Edema: None noted (BUEs)     Vision/Perceptual:    Acuity: Within Defined Limits       Coordination:  Coordination: Generally decreased, functional (BUEs: decreased coordination at rest/during movemeents)  Fine Motor Skills-Upper: Right Impaired;Left Impaired    Gross Motor Skills-Upper: Right Intact; Left Intact      Balance:  Sitting:  (not assessed; pt seen at bed level)  Standing:  (not assessed; pt seen at bed level)     Strength:  Strength: Generally decreased, functional (BUEs: 1/5 shoulder flex; 3/5 biceps )     Tone & Sensation:  Tone: Normal (BUes)  Sensation: Intact (BUEs)     Range of Motion:  AROM: Generally decreased, functional (BUEs: full elbow flex (with additional time))  PROM: Within functional limits (BUEs; with additional time secondary to pain)     Functional Mobility and Transfers for ADLs:  Bed Mobility:  Supine to Sit:  (not assessed; pt seen at bed level)     Transfers:  Sit to Stand:  (pt seen at bed level)     ADL Assessment:  Feeding: Maximum assistance  Oral Facial Hygiene/Grooming: Maximum assistance  Bathing: Maximum assistance; Total assistance  Upper Body Dressing: Maximum assistance  Lower Body Dressing: Maximum assistance; Total assistance  Toileting: Total assistance (catheter)     Cognitive Retraining  Safety/Judgement: Fall prevention; Awareness of environment     Pain:   Pre treatment pain level: 10/10  Post treatment pain level: 10/10  Pain Scale 1: Numeric (0 - 10)  Pain Intensity 1: 10  Pain Location 1: Hand  Pain Orientation 1: Left;Right  Pain Description 1: Burning;Constant     Activity Tolerance: Poor+  Please refer to the flowsheet for vital signs taken during this treatment. After treatment:   [ ] Patient left in no apparent distress sitting up in chair  [X] Patient left in no apparent distress in bed  [X] Call bell left within reach  [X] Nursing notified  [ ] Caregiver present  [ ] Bed alarm activated      COMMUNICATION/EDUCATION: Patient educated on role of OT and continued POC; he verbalized understanding.   [X]    Home safety education was provided and the patient/caregiver indicated understanding. [X]    Patient/family have participated as able in goal setting and plan of care. [X]    Patient/family agree to work toward stated goals and plan of care. [ ]    Patient understands intent and goals of therapy, but is neutral about his/her participation. [ ]    Patient is unable to participate in goal setting and plan of care. This patients plan of care is appropriate for delegation to [facility].      Thank you for this referral.     Heather Pablo MS OTR/L  Time Calculation: 9 mins

## 2017-05-08 NOTE — PROGRESS NOTES
[facility]  Hospitalist Division    Daily progress Note    Patient: Teddy Boeck MRN: [id number]  CSN: [id number]    YOB: 1960  Age: 60 y.o. Sex: female    DOA: 4/26/2017 LOS:  LOS: 11 days                    Subjective:     CC: weakness    Pt is alert and awake, NAD  C/o weakness and decrease hearing on Rt     Objective:      Visit Vitals    BP (!) 161/93    Pulse 96    Temp 99.3 °F (37.4 °C)    Resp 15    Ht 5' 4\" (1.626 m)    Wt 68.1 kg (150 lb 2.1 oz)    SpO2 95%    BMI 25.77 kg/m2       Physical Exam:  NC/AT  NO JVD,TMG  S1/S2 RRR  CTAB, NO WHEEZING  NT,ND, NABS  NO EDEMA  MS 3+/5 in LE, 4/5 UE        Intake and Output:  Current Shift:  05/08 0701 - 05/08 1900  In: 1380 [P.O.:380; I.V.:1000]  Out: -   Last three shifts:  05/06 1901 - 05/08 0700  In: 6985.8 [P.O.:575; I.V.:6410.8]  Out: 1360 [Urine:1360]    Recent Results (from the past 24 hour(s))   HIV SCREEN, 4TH GEN. W/REFLEX CONFIRM    Collection Time: 05/07/17  4:36 PM   Result Value Ref Range    HIV 1/2 Interpretation NONREACTIVE NR      HIV 1/2 result comment SEE NOTE     PHOSPHORUS    Collection Time: 05/08/17  9:13 AM   Result Value Ref Range    Phosphorus 3.3 2.5 - 4.9 MG/DL   METABOLIC PANEL, COMPREHENSIVE    Collection Time: 05/08/17  9:13 AM   Result Value Ref Range    Sodium 143 136 - 145 mmol/L    Potassium 3.6 3.5 - 5.5 mmol/L    Chloride 111 (H) 100 - 108 mmol/L    CO2 21 21 - 32 mmol/L    Anion gap 11 3.0 - 18 mmol/L    Glucose 79 74 - 99 mg/dL    BUN 1 (L) 7.0 - 18 MG/DL    Creatinine 0.32 (L) 0.6 - 1.3 MG/DL    BUN/Creatinine ratio 3 (L) 12 - 20      GFR est AA >60 >60 ml/min/1.73m2    GFR est non-AA >60 >60 ml/min/1.73m2    Calcium 8.0 (L) 8.5 - 10.1 MG/DL    Bilirubin, total 0.9 0.2 - 1.0 MG/DL    ALT (SGPT) 82 (H) 13 - 56 U/L    AST (SGOT) 44 (H) 15 - 37 U/L    Alk.  phosphatase 72 45 - 117 U/L    Protein, total 5.9 (L) 6.4 - 8.2 g/dL    Albumin 2.4 (L) 3.4 - 5.0 g/dL    Globulin 3.5 2.0 - 4.0 g/dL    A-G Ratio 0.7 (L) 0.8 - 1.7     CBC WITH AUTOMATED DIFF    Collection Time: 05/08/17  9:13 AM   Result Value Ref Range    WBC 4.6 4.6 - 13.2 K/uL    RBC 3.68 (L) 4.20 - 5.30 M/uL    HGB 11.3 (L) 12.0 - 16.0 g/dL    HCT 32.0 (L) 35.0 - 45.0 %    MCV 87.0 74.0 - 97.0 FL    MCH 30.7 24.0 - 34.0 PG    MCHC 35.3 31.0 - 37.0 g/dL    RDW 15.4 (H) 11.6 - 14.5 %    PLATELET 345 257 - 045 K/uL    MPV 9.4 9.2 - 11.8 FL    NEUTROPHILS 56 40 - 73 %    LYMPHOCYTES 36 21 - 52 %    MONOCYTES 7 3 - 10 %    EOSINOPHILS 1 0 - 5 %    BASOPHILS 0 0 - 2 %    ABS. NEUTROPHILS 2.6 1.8 - 8.0 K/UL    ABS. LYMPHOCYTES 1.7 0.9 - 3.6 K/UL    ABS. MONOCYTES 0.3 0.05 - 1.2 K/UL    ABS. EOSINOPHILS 0.0 0.0 - 0.4 K/UL    ABS.  BASOPHILS 0.0 0.0 - 0.06 K/UL    DF AUTOMATED     MAGNESIUM    Collection Time: 05/08/17  9:13 AM   Result Value Ref Range    Magnesium 1.7 1.6 - 2.6 mg/dL         Current Facility-Administered Medications:     ELECTROLYTE REPLACEMENT PROTOCOL, 1 Each, Other, PRN, Lynnette Amabile, PA    ELECTROLYTE REPLACEMENT PROTOCOL, 1 Each, Other, PRN, Lynnette Amabile, PA    ELECTROLYTE REPLACEMENT PROTOCOL, 1 Each, Other, PRN, Lynnette Amabile, PA    ELECTROLYTE REPLACEMENT PROTOCOL, 1 Each, Other, PRN, Lynnette Amabile, PA    ELECTROLYTE REPLACEMENT PROTOCOL, 1 Each, Other, PRN, Lynnette Amabile, PA    ELECTROLYTE REPLACEMENT PROTOCOL, 1 Each, Other, PRN, Lynnette Amabile, PA    ELECTROLYTE REPLACEMENT PROTOCOL, 1 Each, Other, PRN, Lynnette Amabile, PA    potassium chloride 10 mEq in 100 ml IVPB, 10 mEq, IntraVENous, Q1H, Lynnette Amabile, PA    magnesium sulfate 1 g/100 ml IVPB (premix or compounded), 1 g, IntraVENous, ONCE, Gay Pardo, Last Rate: 100 mL/hr at 05/08/17 1533, 1 g at 05/08/17 1533    immune globulin 10% infusion 20 g, 20 g, IntraVENous, Q24H, Zoey Jonas DO, Last Rate: 100 mL/hr at 05/08/17 1223, 20 g at 05/08/17 1223    dextrose 5% - 0.9% NaCl with KCl 40 mEq/L infusion, , IntraVENous, CONTINUOUS, Gal Mckeon DO, Last Rate: 125 mL/hr at 05/08/17 0314    senna (SENOKOT) tablet 17.2 mg, 2 Tab, Oral, QHS, Gal Mckeon DO    oxyCODONE IR (ROXICODONE) tablet 10 mg, 10 mg, Oral, Q4H PRN, Gal Mckeon DO, 10 mg at 37/41/52 5808    folic acid (FOLVITE) tablet 1 mg, 1 mg, Oral, DAILY, Neo Onofre MD, 1 mg at 05/08/17 0850    immune globulin 10% (FLEBOGAMMA DIF) infusion 5 g, 5 g, IntraVENous, Q24H, Neo Onofre MD, 5 g at 05/08/17 0755    thiamine (B-1) 100 mg in 0.9% sodium chloride 50 mL IVPB, 100 mg, IntraVENous, Q24H, Neo Onofre MD, 100 mg at 05/07/17 1719    acetaminophen (TYLENOL) tablet 650 mg, 650 mg, Oral, Q4H PRN, Irene Aguilar MD    scopolamine (TRANSDERM-SCOP) 1.5 mg, 1.5 mg, TransDERmal, Q72H, Celada Miracle, DO, 1.5 mg at 05/06/17 1513    QUEtiapine (SEROquel) tablet 50 mg, 50 mg, Oral, QHS, Celada Miracle, DO, 50 mg at 05/08/17 0035    citalopram (CELEXA) tablet 20 mg, 20 mg, Oral, DAILY, Celada Miracle, DO, 20 mg at 05/08/17 0850    benzocaine-menthol (CEPACOL) lozenge 1 Lozenge, 1 Lozenge, Mucous Membrane, PRN, Celada Miracle, DO, 1 Lozenge at 04/28/17 1659    enoxaparin (LOVENOX) injection 40 mg, 40 mg, SubCUTAneous, Q24H, Viktoriya Recio MD, 40 mg at 05/08/17 0500    ondansetron (ZOFRAN) injection 4 mg, 4 mg, IntraVENous, Q4H PRN, Bernardo Estrada MD, 4 mg at 04/30/17 1159    LORazepam (ATIVAN) tablet 1 mg, 1 mg, Oral, Q6H PRN, Pauly Miracle, DO, 1 mg at 05/07/17 1734    oxymetazoline (AFRIN) 0.05 % nasal spray 2 Spray, 2 Spray, Both Nostrils, BID PRN, Celada Miracle, DO    sodium chloride (NS) flush 5-10 mL, 5-10 mL, IntraVENous, PRN, Shirley Castañeda MD, 10 mL at 05/05/17 0544    Lab Results   Component Value Date/Time    Glucose 79 05/08/2017 09:13 AM    Glucose 128 05/07/2017 03:15 AM    Glucose 69 05/06/2017 04:30 AM    Glucose 83 05/02/2017 05:00 AM    Glucose 100 05/01/2017 04:45 AM        Assessment/Plan     Principal Problem:    Encephalopathy (4/27/2017)    Active Problems:    Dehydration (4/27/2017)      Hypokalemia (4/27/2017)      Lactic acidosis (4/27/2017)      Polycythemia ([address] (4/27/2017)      Leukocytosis (4/27/2017)      UTI (urinary tract infection) (4/27/2017)      Elevated liver enzymes (4/27/2017)      Acute inflammatory polyneuropathy (HCC) (5/5/2017)      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

## 2017-05-08 NOTE — PROGRESS NOTES
Late entry for 5/5/17: received call back from Mary Flynn at [facility], states new psych consult from 5/4/17, still indicates dx unclear, will need to document definitive dx or hx of depression prior to progressing with level 2 screen. Spoke with Dr. Luna Gomez & made him aware. Janette HowardRN,ext.  [place]

## 2017-05-08 NOTE — PROGRESS NOTES
Pt remain alert and oriented pleasant orineted x 4. VSS. Encouraged frequent turning, skin dry and intact. Few scattered bruises to upper exts. 0400 Pt refuses am blood darws this am.  Attempted to pursade pt with  at bedside, pt verbalizes, \"maybe later. \"  Remains room air with sats 97. Encouraged po intake , needs assist with fluids and meals.

## 2017-05-08 NOTE — PROGRESS NOTES
FVC maneuver performed  - 3 attempts:  .6   -coached pt on use but she did not seem to produce sufficient effort - pt indicated she just woke up & was still sleepy  QJ=400, sat=100%, RR=21    1400-FVC maneuver  -3 attempts 1.2L - pt stated has been in pain & just given pain medicine   -result still seems somewhat reflected in pt effort

## 2017-05-08 NOTE — PROGRESS NOTES
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 Continue monitoring respiratory parameters per pulmonary. Interim History: Around 4/16/2017 she stopped eating because she was nauseas and anytime she ate she would vomit. There was no diarrhea. On Sunday 4/23/2017 the patient developed ataxia. That evening she fell and did not have the strength to get back up . She crawled from the bathroom to her bed and her boyfriend found her down and put her into bed. Her boyfriend called EMS but she refused to come to the ER. Finally she came to the ER on 4/26/2017 after being brought by the police. At that time her lower extremity weakness had worsened and she had started to develop weakness of her upper extremities. This apparently progressed during her stay. Results reviewed:       Discussed with: Allergies:    Allergies   Allergen Reactions    Aspirin Other (comments)     Stomach cramps    Ibuprofen Not Reported This Time    Levaquin [Levofloxacin] Not Reported This Time           Mobic [Meloxicam] Not Reported This Time    Pcn [Penicillins] Not Reported This Time    Plaquenil [Hydroxychloroquine] Not Reported This Time         Vital Signs:   Visit Vitals    BP (!) 159/104    Pulse 98    Temp 98.6 °F (37 °C)    Resp 14    Ht 5' 4\" (1.626 m)    Wt 68.1 kg (150 lb 2.1 oz)    SpO2 97%    BMI 25.77 kg/m2        Neurological examination:    Appearance: In no acute distress,       Mental status examination: Alert and oriented X 3. Cranial Nerves:         II: visual fields Full to confrontation   II: pupils Equal, round, reactive to light   III,VII: ptosis none   III,IV,VI: extraocular muscles  Full ROM   V: facial light touch sensation     V,VII: corneal reflex     VII: facial muscle function  Normal smile but reports she cannot elevate her eyebrows. VIII: hearing    IX: soft palate elevation     X phonation    XI: sternocleidomastoid strength    XII: tongue strength        Motor exam: Station, gait:  deferred  Wiggles ankles bilaterally. No proximal LE antigravity strength. Can move hands and flex elbows against gravity but no proximal upper extremity antigravity strength.     Reflexes: Absent reflexes in biceps, brachioradialis, knees and ankles.        Review of Systems:   Y  N   Constitutional: [] [] recent weight change  [] [x] fever  [] [] sleep difficulties   ENT/Mouth:  [] [] hearing loss  [] [x] swallowing problems   Cardiovascular:  [] [] chest pain   [] [] palpitations    Respiratory: [] [] cough   [] [] shortness of breath  [] [] sleep apnea  [] [] intubated   Gastrointestinal: [] [] abdominal pain  [x] [] nausea   Genitourinary: [] [] frequent urination  [] [] incontinence    Musculoskeletal:   [] [] joint pain  [] [] muscle pain   Integument:   [] [] rash/itching   Neurological:  [x] [] dizziness/vertigo  [] [] speech problems  [] [] language difficulty  [] [] sedation  [] [] confusion  [] [] agitation/combativeness  [] [] loss of consciousness  [] [] numbness/tingling sensation  [] [] tremors  [x] [] weakness in limbs  [] [] difficulty with balance  [] [] frequent or recurring headaches  [] [] memory loss   [] [] comatose  [] [] seizures   Psychiatric:   [] [] depression  [] [] hallucinations PMH:   Past Medical History:   Diagnosis Date    Allergic rhinitis     Angina pectoris (HCC)     Anxiety     Bronchitis     Chronic fatigue syndrome     Chronic pain     COPD (chronic obstructive pulmonary disease) (HCC)     Generalized pain     Headache, tension-type     HTN (hypertension)     Hyperlipidemia     Hypokalemia     Insomnia     Leg swelling     Lyme disease     Nicotine dependence     OA (osteoarthritis)     RA (rheumatoid arthritis) (HCC)     Vertigo     Vitamin D deficiency       FH:   Family History   Problem Relation Age of Onset    Colon Cancer Other     Cancer Other      lung      SH:   Social History     Social History    Marital status:      Spouse name: N/A    Number of children: N/A    Years of education: N/A     Social History Main Topics    Smoking status: Current Every Day Smoker     Packs/day: 0.50     Years: 30.00    Smokeless tobacco: Never Used    Alcohol use No    Drug use: No    Sexual activity: Not Asked     Other Topics Concern    None     Social History Narrative          Medications:    [x] REVIEWED  Current Facility-Administered Medications   Medication Dose Route Frequency    ELECTROLYTE REPLACEMENT PROTOCOL  1 Each Other PRN    ELECTROLYTE REPLACEMENT PROTOCOL  1 Each Other PRN    ELECTROLYTE REPLACEMENT PROTOCOL  1 Each Other PRN    ELECTROLYTE REPLACEMENT PROTOCOL  1 Each Other PRN    ELECTROLYTE REPLACEMENT PROTOCOL  1 Each Other PRN    ELECTROLYTE REPLACEMENT PROTOCOL  1 Each Other PRN    ELECTROLYTE REPLACEMENT PROTOCOL  1 Each Other PRN    potassium chloride 10 mEq in 100 ml IVPB  10 mEq IntraVENous Q1H    immune globulin 10% infusion 20 g  20 g IntraVENous Q24H    dextrose 5% - 0.9% NaCl with KCl 40 mEq/L infusion   IntraVENous CONTINUOUS    senna (SENOKOT) tablet 17.2 mg  2 Tab Oral QHS    oxyCODONE IR (ROXICODONE) tablet 10 mg  10 mg Oral A5E PRN    folic acid (FOLVITE) tablet 1 mg  1 mg Oral DAILY    immune globulin 10% (FLEBOGAMMA DIF) infusion 5 g  5 g IntraVENous Q24H    thiamine (B-1) 100 mg in 0.9% sodium chloride 50 mL IVPB  100 mg IntraVENous Q24H    acetaminophen (TYLENOL) tablet 650 mg  650 mg Oral Q4H PRN    scopolamine (TRANSDERM-SCOP) 1.5 mg  1.5 mg TransDERmal Q72H    QUEtiapine (SEROquel) tablet 50 mg  50 mg Oral QHS    citalopram (CELEXA) tablet 20 mg  20 mg Oral DAILY    benzocaine-menthol (CEPACOL) lozenge 1 Lozenge  1 Lozenge Mucous Membrane PRN    enoxaparin (LOVENOX) injection 40 mg  40 mg SubCUTAneous Q24H    ondansetron (ZOFRAN) injection 4 mg  4 mg IntraVENous Q4H PRN    LORazepam (ATIVAN) tablet 1 mg  1 mg Oral Q6H PRN    oxymetazoline (AFRIN) 0.05 % nasal spray 2 Spray  2 Spray Both Nostrils BID PRN    sodium chloride (NS) flush 5-10 mL  5-10 mL IntraVENous PRN      Data:      [x] REVIEWED  Recent Results (from the past 24 hour(s))   PHOSPHORUS    Collection Time: 05/08/17  9:13 AM   Result Value Ref Range    Phosphorus 3.3 2.5 - 4.9 MG/DL   METABOLIC PANEL, COMPREHENSIVE    Collection Time: 05/08/17  9:13 AM   Result Value Ref Range    Sodium 143 136 - 145 mmol/L    Potassium 3.6 3.5 - 5.5 mmol/L    Chloride 111 (H) 100 - 108 mmol/L    CO2 21 21 - 32 mmol/L    Anion gap 11 3.0 - 18 mmol/L    Glucose 79 74 - 99 mg/dL    BUN 1 (L) 7.0 - 18 MG/DL    Creatinine 0.32 (L) 0.6 - 1.3 MG/DL    BUN/Creatinine ratio 3 (L) 12 - 20      GFR est AA >60 >60 ml/min/1.73m2    GFR est non-AA >60 >60 ml/min/1.73m2    Calcium 8.0 (L) 8.5 - 10.1 MG/DL    Bilirubin, total 0.9 0.2 - 1.0 MG/DL    ALT (SGPT) 82 (H) 13 - 56 U/L    AST (SGOT) 44 (H) 15 - 37 U/L    Alk.  phosphatase 72 45 - 117 U/L    Protein, total 5.9 (L) 6.4 - 8.2 g/dL    Albumin 2.4 (L) 3.4 - 5.0 g/dL    Globulin 3.5 2.0 - 4.0 g/dL    A-G Ratio 0.7 (L) 0.8 - 1.7     CBC WITH AUTOMATED DIFF    Collection Time: 05/08/17  9:13 AM   Result Value Ref Range    WBC 4.6 4.6 - 13.2 K/uL    RBC 3.68 (L) 4.20 - 5.30 M/uL    HGB 11.3 (L) 12.0 - 16.0 g/dL    HCT 32.0 (L) 35.0 - 45.0 %    MCV 87.0 74.0 - 97.0 FL    MCH 30.7 24.0 - 34.0 PG    MCHC 35.3 31.0 - 37.0 g/dL    RDW 15.4 (H) 11.6 - 14.5 %    PLATELET 815 576 - 085 K/uL    MPV 9.4 9.2 - 11.8 FL    NEUTROPHILS 56 40 - 73 %    LYMPHOCYTES 36 21 - 52 %    MONOCYTES 7 3 - 10 %    EOSINOPHILS 1 0 - 5 %    BASOPHILS 0 0 - 2 %    ABS. NEUTROPHILS 2.6 1.8 - 8.0 K/UL    ABS. LYMPHOCYTES 1.7 0.9 - 3.6 K/UL    ABS. MONOCYTES 0.3 0.05 - 1.2 K/UL    ABS. EOSINOPHILS 0.0 0.0 - 0.4 K/UL    ABS.  BASOPHILS 0.0 0.0 - 0.06 K/UL    DF AUTOMATED     MAGNESIUM    Collection Time: 05/08/17  9:13 AM   Result Value Ref Range    Magnesium 1.7 1.6 - 2.6 mg/dL

## 2017-05-08 NOTE — PROGRESS NOTES
[facility]  ICU Progress Note      Name: Pretty Gupta   : 1960   MRN: [id number]   Date: 2017 8:50 AM     [x]I have reviewed the flowsheet and previous days notes. Events overnight reviewed and discussed with nursing staff. Vital signs and records reviewed.     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    2017  No overnight events  C/o generalized pain, unchanged  +numbness and tingling in all extremities  Afebrile, HD stable  Receiving IVIG currently  Following FVC  Awaiting labs, now agreeable    Medication Review:  · Pressors -  · Sedation -  · Antibiotics -  · Pain -  · GI/ DVT -  · Others (other gtts) D5NS [email or web address]    Safety Bundles: VAP Bundle/ CAUTI/ Severe Sepsis Protocol/ Electrolyte Replacement Protocol    Vital Signs:    Visit Vitals    BP (!) 153/92    Pulse (!) 104    Temp 98.2 °F (36.8 °C)    Resp 22    Ht 5' 4\" (1.626 m)    Wt 68.1 kg (150 lb 2.1 oz)    SpO2 98%    BMI 25.77 kg/m2       O2 Device: Room air       Temp (24hrs), Av.6 °F (37 °C), Min:98.2 °F (36.8 °C), Max:98.8 °F (37.1 °C)       Intake/Output:   Last shift:         Last 3 shifts:  1901 -  0700  In: 6985.8 [P.O.:575; I.V.:6410.8]  Out: 1360 [Urine:1360]    Intake/Output Summary (Last 24 hours) at 17 0850  Last data filed at 17 0600   Gross per 24 hour   Intake          5235.83 ml   Output              880 ml   Net          4355.83 ml                     Physical Exam:    General/Neuro: Awake and alert, unable to lift B legs to gravity, unable to lift proximal arms B, on RA, no conversational dyspnea, hands weak B  HEENT:  Anicteric sclerae; poor dentition  Resp:  Symmetrical chest expansion, no accessory muscle use; good airway entry; no rales/ wheezing/ rhonchi  CV:  Regular, no murmur  GI:  Abdomen soft, non-tender; (+) active bowel sounds  Extremities:  +2 pulses on all extremities; no edema/ cyanosis/ clubbing noted  Skin:  Warm; no rashes/ lesions noted  Devices:  No NGT/OGT, Central line/ PICC, ETT/tracheostomy, chest tube      DATA:     Current Facility-Administered Medications   Medication Dose Route Frequency    immune globulin 10% infusion 20 g  20 g IntraVENous Q24H    dextrose 5% - 0.9% NaCl with KCl 40 mEq/L infusion   IntraVENous CONTINUOUS    senna (SENOKOT) tablet 17.2 mg  2 Tab Oral QHS    oxyCODONE IR (ROXICODONE) tablet 10 mg  10 mg Oral V8H PRN    folic acid (FOLVITE) tablet 1 mg  1 mg Oral DAILY    immune globulin 10% (FLEBOGAMMA DIF) infusion 5 g  5 g IntraVENous Q24H    thiamine (B-1) 100 mg in 0.9% sodium chloride 50 mL IVPB  100 mg IntraVENous Q24H    acetaminophen (TYLENOL) tablet 650 mg  650 mg Oral Q4H PRN    scopolamine (TRANSDERM-SCOP) 1.5 mg  1.5 mg TransDERmal Q72H    QUEtiapine (SEROquel) tablet 50 mg  50 mg Oral QHS    citalopram (CELEXA) tablet 20 mg  20 mg Oral DAILY    benzocaine-menthol (CEPACOL) lozenge 1 Lozenge  1 Lozenge Mucous Membrane PRN    enoxaparin (LOVENOX) injection 40 mg  40 mg SubCUTAneous Q24H    ondansetron (ZOFRAN) injection 4 mg  4 mg IntraVENous Q4H PRN    LORazepam (ATIVAN) tablet 1 mg  1 mg Oral Q6H PRN    oxymetazoline (AFRIN) 0.05 % nasal spray 2 Spray  2 Spray Both Nostrils BID PRN    sodium chloride (NS) flush 5-10 mL  5-10 mL IntraVENous PRN         Labs: Results:       Chemistry Recent Labs      05/07/17   0315  05/06/17   0430   GLU  128*  69*   NA  144  136   K  3.4*  3.3*   CL  112*  102   CO2  21  21   BUN  6*  7   CREA  0.34*  0.33*   CA  7.8*  8.9   AGAP  11  13   BUCR  18  21*   AP  72  84   TP  5.6*  6.6   ALB  2.4*  2.8*   GLOB  3.2  3.8   AGRAT  0.8  0.7*      CBC w/Diff Recent Labs      05/07/17   0315  05/06/17   0430   WBC  7.2  7.7   RBC  3.55*  4.13*   HGB  11.0*  12.8   HCT  30.9*  35.7   PLT  354  314   GRANS  64  68   LYMPH  27  24   EOS  1  1 Coagulation No results for input(s): PTP, INR, APTT in the last 72 hours. No lab exists for component: INREXT    Liver Enzymes Recent Labs      05/07/17   0315   TP  5.6*   ALB  2.4*   AP  72   SGOT  57*      ABG No results found for: PH, PHI, PCO2, PCO2I, PO2, PO2I, HCO3, HCO3I, FIO2, FIO2I   Microbiology Recent Labs      05/05/17   0926   CULT  NO GROWTH 2 DAYS          Telemetry: [x]Sinus []A-flutter []Paced    []A-fib []Multiple PVCs                    Imaging:  []I have personally reviewed the patients radiographs  []Radiographs reviewed with radiologist   []No change from prior, tubes and lines in adequate position  []Improved   []Worsening    4/26/17 CT head  1. Subtle left-to-right shift of midline structures and asymmetric left  extra-axial fluid density with suggestion of possible small arachnoid cyst along  the anterior margin of the left sylvian fissure. Probable asymmetric left  frontal and left temporal lobe atrophy otherwise. MRI could better delineate  suspected extra-axial lesion. No findings to suggest acute infarct. No acute  intracranial hemorrhage. 2. Mild cerebral atrophy/volume loss and periventricular white matter changes,  most commonly seen with chronic microvascular disease    MRI 4/29/17  1. Atrophy and mild to moderate chronic small vessel changes with asymmetric  volume loss in the left cerebral hemisphere which is of indeterminate etiology  and significance.        2. Otherwise, unremarkable evaluation. Specifically, no acute intracranial  abnormalities are identified. 5/6/17 MRI Lumbar spine  1. Negative for imaging evidence of Ginger Fuchs syndrome and other acute  cauda equina processes. Widely patent central spinal canal.     2. Right greater than left posterior paraspinal muscle edema -- a common  finding that often reflects either chronic denervation edema or acute/subacute  muscle strain.     3. Straightened lumbar lordosis with minor lumbar scoliosis.     4. Potential for right L5 nerve root impingement in the extraforaminal zone.          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  - Neuro following, await further work up  - Continue IVIG, thiamine, FA  - CMV IGG elevated, IGM negative  - HIV, heavy metal screen pending  - TSH nL  · Electrolyte abnormalities  - Replace lytes-K in IVF  - Mg replacement  - Check phos-await labs today  · Pulmonary-currently on RA, no distress noted  - Continue to monitor FVC-pt needs ongoing encouragement  · GI-elevated LFT-improved             · Prophylaxis - DVT-lovenox  · Discussed in interdisciplinary rounds  · FULL CODE          [x]See my orders for details    My assessment/plan was discussed with:  [x]nursing []PT/OT    []respiratory therapy [x]Dr.El Kathleen   []family [x]pt         BRINDA Oh

## 2017-05-08 NOTE — PROGRESS NOTES
2030,Bedside FVC X3-,frist-1.0,second1.0,and third1.5 pt sitting up in bed needed much encouragement. 0200,pt did not want to do a second FVC.

## 2017-05-08 NOTE — PROGRESS NOTES
completed follow up visit with patient and a Spiritual assessment of patient was done. Patient was in the midst of needed assistance when I arrived, but spoke briefly with her.     Chaplains will continue to follow and will provide pastoral care on an as needed/requested basis    Chaplain Arturo Domingo   Board Certified [address] [place] [address]   [phone]

## 2017-05-09 LAB
ALBUMIN SERPL ELPH-MCNC: 3.1 G/DL (ref 2.9–4.4)
ALBUMIN/GLOB SERPL: 1.2 {RATIO} (ref 0.7–1.7)
ALPHA1 GLOB SERPL ELPH-MCNC: 0.3 G/DL (ref 0–0.4)
ALPHA2 GLOB SERPL ELPH-MCNC: 0.8 G/DL (ref 0.4–1)
ANION GAP BLD CALC-SCNC: 9 MMOL/L (ref 3–18)
ARSENIC BLD-MCNC: 7 UG/L (ref 2–23)
B-GLOBULIN SERPL ELPH-MCNC: 0.9 G/DL (ref 0.7–1.3)
BUN SERPL-MCNC: <1 MG/DL (ref 7–18)
BUN/CREAT SERPL: ABNORMAL (ref 12–20)
CA-I SERPL-SCNC: 1.11 MMOL/L (ref 1.12–1.32)
CALCIUM SERPL-MCNC: 8.1 MG/DL (ref 8.5–10.1)
CHLORIDE SERPL-SCNC: 109 MMOL/L (ref 100–108)
CMV DNA SERPL NAA+PROBE-ACNC: NEGATIVE IU/ML
CMV DNA SERPL NAA+PROBE-LOG IU: NORMAL LOG10 IU/ML
CO2 SERPL-SCNC: 21 MMOL/L (ref 21–32)
CREAT SERPL-MCNC: 0.3 MG/DL (ref 0.6–1.3)
GAMMA GLOB SERPL ELPH-MCNC: 0.7 G/DL (ref 0.4–1.8)
GLOBULIN SER-MCNC: 2.7 G/DL (ref 2.2–3.9)
GLUCOSE SERPL-MCNC: 93 MG/DL (ref 74–99)
HISPANIC, LDP2T: NORMAL
IGA SERPL-MCNC: 169 MG/DL (ref 87–352)
IGG SERPL-MCNC: 753 MG/DL (ref 700–1600)
IGM SERPL-MCNC: 98 MG/DL (ref 26–217)
INTERPRETATION SERPL IEP-IMP: ABNORMAL
LEAD BLD-MCNC: NORMAL UG/DL (ref 0–19)
M PROTEIN SERPL ELPH-MCNC: ABNORMAL G/DL
MAGNESIUM SERPL-MCNC: 1.6 MG/DL (ref 1.6–2.6)
MAGNESIUM SERPL-MCNC: 2.2 MG/DL (ref 1.6–2.6)
MERCURY BLD-MCNC: NORMAL UG/L (ref 0–14.9)
POTASSIUM SERPL-SCNC: 5 MMOL/L (ref 3.5–5.5)
PROT SERPL-MCNC: 5.8 G/DL (ref 6–8.5)
RACE, 017371: NORMAL
SODIUM SERPL-SCNC: 139 MMOL/L (ref 136–145)
SPECIMEN SOURCE: NORMAL
TEST PURPOSE, LDP4T: NORMAL
VIT B6 SERPL-MCNC: 5.1 UG/L (ref 2–32.8)

## 2017-05-09 PROCEDURE — 74011250637 HC RX REV CODE- 250/637: Performed by: INTERNAL MEDICINE

## 2017-05-09 PROCEDURE — 83735 ASSAY OF MAGNESIUM: CPT | Performed by: INTERNAL MEDICINE

## 2017-05-09 PROCEDURE — 74011250637 HC RX REV CODE- 250/637: Performed by: HOSPITALIST

## 2017-05-09 PROCEDURE — 36415 COLL VENOUS BLD VENIPUNCTURE: CPT | Performed by: FAMILY MEDICINE

## 2017-05-09 PROCEDURE — 74011250636 HC RX REV CODE- 250/636

## 2017-05-09 PROCEDURE — 74011250636 HC RX REV CODE- 250/636: Performed by: HOSPITALIST

## 2017-05-09 PROCEDURE — 74011250636 HC RX REV CODE- 250/636: Performed by: PSYCHIATRY & NEUROLOGY

## 2017-05-09 PROCEDURE — 83735 ASSAY OF MAGNESIUM: CPT | Performed by: PHYSICIAN ASSISTANT

## 2017-05-09 PROCEDURE — 80048 BASIC METABOLIC PNL TOTAL CA: CPT | Performed by: FAMILY MEDICINE

## 2017-05-09 PROCEDURE — 74011250636 HC RX REV CODE- 250/636: Performed by: FAMILY MEDICINE

## 2017-05-09 PROCEDURE — 74011000258 HC RX REV CODE- 258: Performed by: PSYCHIATRY & NEUROLOGY

## 2017-05-09 PROCEDURE — 74011250637 HC RX REV CODE- 250/637: Performed by: PSYCHIATRY & NEUROLOGY

## 2017-05-09 PROCEDURE — 97110 THERAPEUTIC EXERCISES: CPT

## 2017-05-09 PROCEDURE — 65660000001 HC RM ICU INTERMED STEPDOWN

## 2017-05-09 PROCEDURE — 74011000258 HC RX REV CODE- 258: Performed by: PHYSICIAN ASSISTANT

## 2017-05-09 PROCEDURE — 77030020245 HC SOL INJ 5% D/0.9%NACL

## 2017-05-09 PROCEDURE — 82330 ASSAY OF CALCIUM: CPT | Performed by: PHYSICIAN ASSISTANT

## 2017-05-09 RX ORDER — DOXYCYCLINE 100 MG/1
100 CAPSULE ORAL EVERY 12 HOURS
Status: COMPLETED | OUTPATIENT
Start: 2017-05-09 | End: 2017-05-12

## 2017-05-09 RX ORDER — MAGNESIUM SULFATE HEPTAHYDRATE 40 MG/ML
2 INJECTION, SOLUTION INTRAVENOUS ONCE
Status: DISCONTINUED | OUTPATIENT
Start: 2017-05-09 | End: 2017-05-09

## 2017-05-09 RX ORDER — SODIUM CHLORIDE 9 MG/ML
250 INJECTION, SOLUTION INTRAVENOUS CONTINUOUS
Status: DISCONTINUED | OUTPATIENT
Start: 2017-05-10 | End: 2017-05-10

## 2017-05-09 RX ORDER — DEXTROSE MONOHYDRATE AND SODIUM CHLORIDE 5; .9 G/100ML; G/100ML
100 INJECTION, SOLUTION INTRAVENOUS CONTINUOUS
Status: DISCONTINUED | OUTPATIENT
Start: 2017-05-09 | End: 2017-05-11

## 2017-05-09 RX ORDER — PSEUDOEPHEDRINE HCL 30 MG
30 TABLET ORAL EVERY 6 HOURS
Status: DISCONTINUED | OUTPATIENT
Start: 2017-05-09 | End: 2017-05-14

## 2017-05-09 RX ORDER — MAGNESIUM SULFATE HEPTAHYDRATE 40 MG/ML
2 INJECTION, SOLUTION INTRAVENOUS
Status: COMPLETED | OUTPATIENT
Start: 2017-05-09 | End: 2017-05-14

## 2017-05-09 RX ORDER — MAGNESIUM SULFATE HEPTAHYDRATE 40 MG/ML
INJECTION, SOLUTION INTRAVENOUS
Status: COMPLETED
Start: 2017-05-09 | End: 2017-05-09

## 2017-05-09 RX ADMIN — GABAPENTIN 300 MG: 300 CAPSULE ORAL at 21:06

## 2017-05-09 RX ADMIN — LORAZEPAM 1 MG: 1 TABLET ORAL at 08:17

## 2017-05-09 RX ADMIN — IMMUNE GLOBULIN INTRAVENOUS (HUMAN) 5 G: 5 INJECTION, SOLUTION INTRAVENOUS at 07:56

## 2017-05-09 RX ADMIN — DOXYCYCLINE 100 MG: 100 CAPSULE ORAL at 21:05

## 2017-05-09 RX ADMIN — LORAZEPAM 1 MG: 1 TABLET ORAL at 15:09

## 2017-05-09 RX ADMIN — DEXTROSE MONOHYDRATE AND SODIUM CHLORIDE 100 ML/HR: 5; .9 INJECTION, SOLUTION INTRAVENOUS at 21:08

## 2017-05-09 RX ADMIN — QUETIAPINE FUMARATE 50 MG: 25 TABLET, FILM COATED ORAL at 21:06

## 2017-05-09 RX ADMIN — OXYCODONE HYDROCHLORIDE 10 MG: 5 TABLET ORAL at 01:14

## 2017-05-09 RX ADMIN — IMMUNE GLOBULIN INTRAVENOUS (HUMAN) 20 G: 5 INJECTION, SOLUTION INTRAVENOUS at 10:24

## 2017-05-09 RX ADMIN — CITALOPRAM HYDROBROMIDE 20 MG: 20 TABLET ORAL at 08:04

## 2017-05-09 RX ADMIN — OXYCODONE HYDROCHLORIDE 10 MG: 5 TABLET ORAL at 08:04

## 2017-05-09 RX ADMIN — DEXTROSE MONOHYDRATE AND SODIUM CHLORIDE 100 ML/HR: 5; .9 INJECTION, SOLUTION INTRAVENOUS at 10:29

## 2017-05-09 RX ADMIN — MAGNESIUM SULFATE HEPTAHYDRATE 2 G: 40 INJECTION, SOLUTION INTRAVENOUS at 18:57

## 2017-05-09 RX ADMIN — FOLIC ACID 1 MG: 1 TABLET ORAL at 08:04

## 2017-05-09 RX ADMIN — PSEUDOEPHEDRINE HCL 30 MG: 30 TABLET, FILM COATED ORAL at 17:35

## 2017-05-09 RX ADMIN — GABAPENTIN 300 MG: 300 CAPSULE ORAL at 08:04

## 2017-05-09 RX ADMIN — SENNOSIDES 17.2 MG: 8.6 TABLET, FILM COATED ORAL at 22:00

## 2017-05-09 RX ADMIN — GABAPENTIN 300 MG: 300 CAPSULE ORAL at 15:09

## 2017-05-09 RX ADMIN — OXYCODONE HYDROCHLORIDE 10 MG: 5 TABLET ORAL at 14:17

## 2017-05-09 RX ADMIN — THIAMINE HYDROCHLORIDE 100 MG: 100 INJECTION, SOLUTION INTRAMUSCULAR; INTRAVENOUS at 15:09

## 2017-05-09 RX ADMIN — ENOXAPARIN SODIUM 40 MG: 40 INJECTION SUBCUTANEOUS at 05:11

## 2017-05-09 NOTE — PROGRESS NOTES
0700 Received patient from [facility]. Patient appears comfortable resting in bed with eyes closed. 0800 Patient awake and alert. Appears anxious, HR in 110's, SPO2 99%, and RR in 30's. Patient given PRN P.O. Wilene Peals which she had not had since 5/7. Stated that her IV is hurting her and asked nurse what was put in IV. Nurse had not connected medication to IV bag at this time. IV flushed and has good blood return without s/s of infiltration but patient is receiving fluids with 40K+. Serum K+ 5.0 today. Jamila PARRY changed fluids to D5 0.9% @100ml/hr. 3261 Patient c/o pain 10/10 generalized. Given PRN pain medication see MAR    2344 Patient agitated and complaining that lab is sticking her too much. Refused lab draw at this time because she stated they billy labs this morning already. Patient informed that the doctor just ordered this lab and they cannot use the blood from this morning. 1000 Patient can move bilateral upper and lower extremeties but is very weak. Needs assistance with eating all meals    1225 Patient appears much more comfortable at this time after receiving P.O. ativan. , SPO2 98%, RR 14. Son at bedside talking with patient. 65 Son at bedside feeding patient. Patient HR 95 bpm, RR 20, . Does not appear to be in any distress at the moment. Refused to be repositioned and stated she is dry. Son at bedside agrees. 1900 Bedside and Verbal shift change report given to [address] [place] [address] (oncoming nurse) by Krystina Hackett RN (offgoing nurse). Report included the following information SBAR, Kardex, ED Summary, Intake/Output, MAR and Cardiac Rhythm ST/NSR.

## 2017-05-09 NOTE — PROGRESS NOTES
Problem: Self Care Deficits Care Plan (Adult)  Goal: *Acute Goals and Plan of Care (Insert Text)  Occupational Therapy Goals  Initiated 4/29/2017; re-evaluated on 5/8/2017 within 7 day(s). 1. Patient will perform self-feeding with minimal assistance/contact guard assist, adaptive strategies prn.   2. Patient will perform grooming with minimal assistance/contact guard assist.  3. Patient will perform upper body dressing with minimal assistance/contact guard assist.  4. Patient will perform functional task for 3 minutes while seated on EOB with mod assist for balance. 5. Patient will participate in upper extremity therapeutic exercise/activities with minimal assistance/contact guard assist for 8 minutes to increase strength/endurance for ADLs. 6. Patient will perform functional task with minimal assistance utilizing adaptive strategies, prn, to maintain function for ADLs. Outcome: Progressing Towards Goal  OCCUPATIONAL THERAPY TREATMENT     Patient: Jaleel Chiang (89 y.o. female)  Date: 5/9/2017  Diagnosis: Hypokalemia  UTI (urinary tract infection)  Dehydration  Encephalopathy  Polycythemia (HCC)  Elevated liver enzymes  Lactic acidosis  Leukocytosis Encephalopathy       Precautions: Fall  Chart, occupational therapy assessment, plan of care, and goals were reviewed. ASSESSMENT:  Pt seen for UE TherEx at bed level. Pt not able to move BUE shoulders against gravity, performed PROM all planes. Pt flexes BUEs, elbows and wrists, fatigues easily, requires assist to complete rep 10x. Pt's friend, Cony Barrios, expressing frustration w/continued request to repeat to history to all staff involved in case.    EDUCATION Pt and friend, Cony Barrios, educated on importance of UE therEx  Progression toward goals:  [ ]          Improving appropriately and progressing toward goals  [X]          Improving slowly and progressing toward goals  [ ]          Not making progress toward goals and plan of care will be adjusted PLAN:  Patient continues to benefit from skilled intervention to address the above impairments. Continue treatment per established plan of care. Discharge Recommendations:  Skilled Nursing Facility/LTC  Further Equipment Recommendations for Discharge:  hospital bed and wheelchair        SUBJECTIVE:   Patient stated Is Dr. Laurie Brewster here? Gino Chavira      OBJECTIVE DATA SUMMARY:         Cognitive/Behavioral Status:  Neurologic State: Alert  Orientation Level: Oriented X4  Cognition: Follows commands  Safety/Judgement: Fall prevention, Awareness of environment     Therapeutic Exercises:   AAROM > AROM BUE elbow/wrist flexion/extension  PROM > AAROM BUE shoulder flexion/extension, horizontal ab/adduction     Pain:  Pre Treatment:8  Post Treatment:10  Pain Scale 1: Numeric (0 - 10)  Pain Intensity 1: 10  Pain Location 1: Generalized  Pain Description 1: Aching  Pain Intervention(s) 1: Rest     Activity Tolerance:    Poor     Please refer to the flowsheet for vital signs taken during this treatment.   After treatment:   [ ]  Patient left in no apparent distress sitting up in chair  [X]  Patient left in no apparent distress in bed  [X]  Call bell left within reach  [X]  Nursing notified  [X]  Saud Wright, present  [ ]  Bed alarm activated     ANGÉLICA Woods  Time Calculation: 16 mins

## 2017-05-09 NOTE — PROGRESS NOTES
INFECTIOUS DISEASE FOLLOW UP NOTE :-     Admit Date: 4/26/2017    Current abx Prior abx   None Cefepime 4/28 - 0, Ceftriaxone 4/29 - 1      ASSESSMENT - > REC:      Guilain Kealia syndrome  - suspect precipitating factor was acute viral pharyngitis (7 days PTA) No recent flu-like illness, diarrheal episodes, no recent vaccinations, no recent travel  - currently no evidence of pharyngeal inflammation or any other active infection -> IVIG per Neurology  -> no antibacterial therapy indicated at this time  -> send stool cx which will cover campylobacter (last BM 5/6)  -> CMV IgG sig elevated but neg IgM as for Lyme- s/o old disease   Severe Hypokalemia on admission  - due to poor po intake  - corrected     Abnormal CSF  - elevated protein, normal glucose without leukorrhachia  - c/w GBS - CSF studies not s/o infection but await Cx   Abnormal LFT's  - has diffuse hepatic steatosis on CT +/- dehydration  - transaminases, alk phos improving - Slightly better   HTN      COPD     OA/ RA     Hyperlipidemia     ? Chronic Fatigue Syndrome     h/o Lyme Disease  - dx'd ~ 2001 rx'd w/ 2 1-month courses doxycyline 2004 and 2006 and subsequently w/ multiple persistent complaints.  multiple + IgG western blots and negative IgM western blots dating back to 2003 (per Dr. Adrian Suarez - [place], 2011: no further treatment recommended) - IgG positive and IgM negative- as expected with her h/o Lyme in past      MICROBIOLOGY:   4/26 blcx NG x 2          urcx  NG  MRSA scrn neg  5/5 CSF cx ntd     LINES AND CATHETERS:   piv     MEDICATIONS:     Current Facility-Administered Medications   Medication Dose Route Frequency    ELECTROLYTE REPLACEMENT PROTOCOL  1 Each Other PRN    ELECTROLYTE REPLACEMENT PROTOCOL  1 Each Other PRN    ELECTROLYTE REPLACEMENT PROTOCOL  1 Each Other PRN    ELECTROLYTE REPLACEMENT PROTOCOL  1 Each Other PRN    ELECTROLYTE REPLACEMENT PROTOCOL  1 Each Other PRN    ELECTROLYTE REPLACEMENT PROTOCOL  1 Each Other PRN    ELECTROLYTE REPLACEMENT PROTOCOL  1 Each Other PRN    gabapentin (NEURONTIN) capsule 300 mg  300 mg Oral TID    immune globulin 10% infusion 20 g  20 g IntraVENous Q24H    dextrose 5% - 0.9% NaCl with KCl 40 mEq/L infusion   IntraVENous CONTINUOUS    senna (SENOKOT) tablet 17.2 mg  2 Tab Oral QHS    oxyCODONE IR (ROXICODONE) tablet 10 mg  10 mg Oral V1N PRN    folic acid (FOLVITE) tablet 1 mg  1 mg Oral DAILY    immune globulin 10% (FLEBOGAMMA DIF) infusion 5 g  5 g IntraVENous Q24H    thiamine (B-1) 100 mg in 0.9% sodium chloride 50 mL IVPB  100 mg IntraVENous Q24H    acetaminophen (TYLENOL) tablet 650 mg  650 mg Oral Q4H PRN    scopolamine (TRANSDERM-SCOP) 1.5 mg  1.5 mg TransDERmal Q72H    QUEtiapine (SEROquel) tablet 50 mg  50 mg Oral QHS    citalopram (CELEXA) tablet 20 mg  20 mg Oral DAILY    benzocaine-menthol (CEPACOL) lozenge 1 Lozenge  1 Lozenge Mucous Membrane PRN    enoxaparin (LOVENOX) injection 40 mg  40 mg SubCUTAneous Q24H    ondansetron (ZOFRAN) injection 4 mg  4 mg IntraVENous Q4H PRN    LORazepam (ATIVAN) tablet 1 mg  1 mg Oral Q6H PRN    oxymetazoline (AFRIN) 0.05 % nasal spray 2 Spray  2 Spray Both Nostrils BID PRN    sodium chloride (NS) flush 5-10 mL  5-10 mL IntraVENous PRN       SUBJECTIVE :     Interval notes reviewed. Afebrile. Continues to c/o feeling worse than yesterday. Says paralyzed from jaw down. Continue to c/o pain. Difficult to access her by physical exam as had refused therapy and exams per RN. No family/friend at bedside.      OBJECTIVE     Visit Vitals    BP (!) 142/101    Pulse 95    Temp 98.1 °F (36.7 °C)    Resp 30    Ht 5' 4\" (1.626 m)    Wt 68.1 kg (150 lb 2.1 oz)    SpO2 99%    BMI 25.77 kg/m2       Temp (24hrs), Av.7 °F (37.1 °C), Min:98.1 °F (36.7 °C), Max:99.4 °F (37.4 °C)      Gen-No distress  HENT- No thrush  Chest- Symmetric expansion, CTA  CV-S1S2 RR, No JVD, No edema  Abd-Soft, NT, ND, BS+  Skin-No jaundice, cyanosis, clubbing. No decubitus  Neuro: awake and oriented, bilateral upper and lower extremity weakness, Rt>left, no NR. Labs: Results:   Chemistry Recent Labs      05/08/17   0913  05/07/17   0315   GLU  79  128*   NA  143  144   K  3.6  3.4*   CL  111*  112*   CO2  21  21   BUN  1*  6*   CREA  0.32*  0.34*   CA  8.0*  7.8*   AGAP  11  11   BUCR  3*  18   AP  72  72   TP  5.9*  5.6*   ALB  2.4*  2.4*   GLOB  3.5  3.2   AGRAT  0.7*  0.8      CBC w/Diff Recent Labs      05/08/17   0913  05/07/17   0315   WBC  4.6  7.2   RBC  3.68*  3.55*   HGB  11.3*  11.0*   HCT  32.0*  30.9*   PLT  398  354   GRANS  56  64   LYMPH  36  27   EOS  1  1          RADIOLOGY :          Imaging    Results from [place] encounter on 04/26/17   XR SPINAL PUNC LUMB DX   Narrative History:Guillian Kent    COMPARISON: MRI brain 4/29/2017. Informed consent and utilizing sterile technique and local anesthetic a  diagnostic lumbar puncture was performed under fluoroscopy utilizing a posterior  midline approach at L2-3. A 20-gauge needle was used. 4 tubes of clear CSF  forwarded to laboratory. No complications. Timeout performed at 0908. Fluoro time:  3.7 minutes  Radiation dose: (Reference air kerma): 162.89 mGy    Fluoroscopy was used for guidance to document position of the needle with images  saved under fluoroscopy. Impression IMPRESSION:  1. Diagnostic fluoroscopic lumbar puncture performed without complication. Patient transported back to the brower. Results from [place] encounter on 04/26/17   CT ABD PELV W CONT   Narrative CT OF THE ABDOMEN AND PELVIS, WITH CONTRAST    INDICATION: Upper abdominal pain and abnormal LFTs    COMPARISON: 8/8/2016. TECHNIQUE: Standard helical CT performed through the abdomen and pelvis with IV  contrast.  Coronal and sagittal reformations were generated.      One or more dose reduction techniques were used on this CT: automated exposure  control, adjustment of the mAs and/or kVp according to patient's size, and  iterative reconstruction techniques. The specific techniques utilized on this CT  exam have been documented in the patient's electronic medical record.    ============    FINDINGS:    INFERIOR THORAX: Moderate right and mild left dependent atelectasis. No acute  pulmonary infiltrate. Mild global cardiomegaly. No pericardial effusion. LIVER/BILIARY: Diffuse hepatic steatosis. No suspicious liver lesion. No biliary  dilation. Punctate gallstones with no secondary findings of cholecystitis. SPLEEN: Normal.    PANCREAS: Normal.    ADRENALS: Normal.    KIDNEYS: Exophytic right upper pole renal cyst. No acute or suspicious renal  abnormality. Chales Minus LYMPH NODES: No mesenteric or retroperitoneal lymphadenopathy. GI TRACT: No wall thickening or dilation. Small hiatal hernia. Normal appendix. Scattered colonic diverticulosis, with no findings of an acute diverticulitis. VASCULATURE: Normal caliber distal thoracic and abdominal aorta. PELVIC ORGANS: Haley catheter is present within the nondistended bladder. Heterogeneously enhancing endometrial complex. BONES: No acute osseous abnormality. Degenerative changes of the thoracic spine. OTHER: No ascites or free intraperitoneal air.    ============         Impression IMPRESSION:    No acute or focal abnormality to explain patient's abdominal pain. There is  diffuse hepatic steatosis. Cholelithiasis. Colonic diverticulosis with no  findings of an acute diverticulitis. Note: Preliminary report sent to the Emergency Department by the radiology  resident at the time of the study. I have independently examined the patient and reviewed all lab studies and imgaing as well as review of nursing notes and physican notes from the past 24 hours. The plan of care has been discussed with the patient/relative and all questions are answered.      Maximino Ha Felicia Marx MD  May 9, 2017    [facility] AT THE [facility]  [phone]

## 2017-05-09 NOTE — PROGRESS NOTES
[facility]  ICU Progress Note      Name: Jaleel Chiang   : 1960   MRN: [id number]   Date: 2017 8:50 AM     [x]I have reviewed the flowsheet and previous days notes. Events overnight reviewed and discussed with nursing staff. Vital signs and records reviewed.     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    2017  No overnight events  C/o increased pain in R hand and sore throat, +dysphagia, +R ear pain  States she feels worse today when she woke up, +anxiety  Afebrile, HD stable  Receiving IVIG currently    Medication Review:  · Pressors -  · Sedation -  · Antibiotics -  · Pain -  · GI/ DVT -  · Others (other gtts) D5NS @100    Safety Bundles: VAP Bundle/ CAUTI/ Severe Sepsis Protocol/ Electrolyte Replacement Protocol    Vital Signs:    Visit Vitals    BP (!) 142/101    Pulse 95    Temp 98.1 °F (36.7 °C)    Resp 30    Ht 5' 4\" (1.626 m)    Wt 68.1 kg (150 lb 2.1 oz)    SpO2 99%    BMI 25.77 kg/m2       O2 Device: Room air       Temp (24hrs), Av.7 °F (37.1 °C), Min:98.1 °F (36.7 °C), Max:99.4 °F (37.4 °C)       Intake/Output:   Last shift:         Last 3 shifts:  1901 -  0700  In: 7505 [P.O.:480; I.V.:2875]  Out: 2435 [Urine:2435]    Intake/Output Summary (Last 24 hours) at 17 0851  Last data filed at 17 0525   Gross per 24 hour   Intake             3005 ml   Output             2000 ml   Net             1005 ml                     Physical Exam:    General/Neuro: Awake and alert, unable to lift B legs to gravity, unable to lift proximal arms B, R weaker than L, on RA, + conversational dyspnea, hands weak B, R>L  HEENT:  Anicteric sclerae; poor dentition  Resp:  Symmetrical chest expansion, no accessory muscle use; good airway entry; no rales/ wheezing/ rhonchi  CV:  Regular, no murmur  GI:  Abdomen soft, non-tender; (+) active bowel sounds  Extremities:  +2 pulses on all extremities; no edema/ cyanosis/ clubbing noted  Skin:  Warm; no rashes/ lesions noted  Devices:  +abraham      DATA:     Current Facility-Administered Medications   Medication Dose Route Frequency    dextrose 5% and 0.9% NaCl infusion  100 mL/hr IntraVENous CONTINUOUS    ELECTROLYTE REPLACEMENT PROTOCOL  1 Each Other PRN    ELECTROLYTE REPLACEMENT PROTOCOL  1 Each Other PRN    ELECTROLYTE REPLACEMENT PROTOCOL  1 Each Other PRN    ELECTROLYTE REPLACEMENT PROTOCOL  1 Each Other PRN    ELECTROLYTE REPLACEMENT PROTOCOL  1 Each Other PRN    ELECTROLYTE REPLACEMENT PROTOCOL  1 Each Other PRN    ELECTROLYTE REPLACEMENT PROTOCOL  1 Each Other PRN    gabapentin (NEURONTIN) capsule 300 mg  300 mg Oral TID    immune globulin 10% infusion 20 g  20 g IntraVENous Q24H    senna (SENOKOT) tablet 17.2 mg  2 Tab Oral QHS    oxyCODONE IR (ROXICODONE) tablet 10 mg  10 mg Oral S6N PRN    folic acid (FOLVITE) tablet 1 mg  1 mg Oral DAILY    immune globulin 10% (FLEBOGAMMA DIF) infusion 5 g  5 g IntraVENous Q24H    thiamine (B-1) 100 mg in 0.9% sodium chloride 50 mL IVPB  100 mg IntraVENous Q24H    acetaminophen (TYLENOL) tablet 650 mg  650 mg Oral Q4H PRN    scopolamine (TRANSDERM-SCOP) 1.5 mg  1.5 mg TransDERmal Q72H    QUEtiapine (SEROquel) tablet 50 mg  50 mg Oral QHS    citalopram (CELEXA) tablet 20 mg  20 mg Oral DAILY    benzocaine-menthol (CEPACOL) lozenge 1 Lozenge  1 Lozenge Mucous Membrane PRN    enoxaparin (LOVENOX) injection 40 mg  40 mg SubCUTAneous Q24H    ondansetron (ZOFRAN) injection 4 mg  4 mg IntraVENous Q4H PRN    LORazepam (ATIVAN) tablet 1 mg  1 mg Oral Q6H PRN    oxymetazoline (AFRIN) 0.05 % nasal spray 2 Spray  2 Spray Both Nostrils BID PRN    sodium chloride (NS) flush 5-10 mL  5-10 mL IntraVENous PRN         Labs: Results:       Chemistry Recent Labs      05/09/17   0625  05/08/17   0913  05/07/17   0315   GLU  93  79  128*   NA  139 143  144   K  5.0  3.6  3.4*   CL  109*  111*  112*   CO2  21  21  21   BUN  <1*  1*  6*   CREA  0.30*  0.32*  0.34*   CA  8.1*  8.0*  7.8*   AGAP  9  11  11   BUCR  Cannot be calulated  3*  18   AP   --   72  72   TP   --   5.9*  5.6*   ALB   --   2.4*  2.4*   GLOB   --   3.5  3.2   AGRAT   --   0.7*  0.8      CBC w/Diff Recent Labs      05/08/17   0913  05/07/17   0315   WBC  4.6  7.2   RBC  3.68*  3.55*   HGB  11.3*  11.0*   HCT  32.0*  30.9*   PLT  398  354   GRANS  56  64   LYMPH  36  27   EOS  1  1      Coagulation No results for input(s): PTP, INR, APTT in the last 72 hours. No lab exists for component: INREXT, INREXT    Liver Enzymes Recent Labs      05/08/17   0913   TP  5.9*   ALB  2.4*   AP  72   SGOT  44*      ABG No results found for: PH, PHI, PCO2, PCO2I, PO2, PO2I, HCO3, HCO3I, FIO2, FIO2I   Microbiology No results for input(s): CULT in the last 72 hours. Telemetry: [x]Sinus []A-flutter []Paced    []A-fib []Multiple PVCs                    Imaging:  []I have personally reviewed the patients radiographs  []Radiographs reviewed with radiologist   []No change from prior, tubes and lines in adequate position  []Improved   []Worsening    4/26/17 CT head  1. Subtle left-to-right shift of midline structures and asymmetric left  extra-axial fluid density with suggestion of possible small arachnoid cyst along  the anterior margin of the left sylvian fissure. Probable asymmetric left  frontal and left temporal lobe atrophy otherwise. MRI could better delineate  suspected extra-axial lesion. No findings to suggest acute infarct. No acute  intracranial hemorrhage. 2. Mild cerebral atrophy/volume loss and periventricular white matter changes,  most commonly seen with chronic microvascular disease    MRI 4/29/17  1. Atrophy and mild to moderate chronic small vessel changes with asymmetric  volume loss in the left cerebral hemisphere which is of indeterminate etiology  and significance.        2. Otherwise, unremarkable evaluation. Specifically, no acute intracranial  abnormalities are identified. 5/6/17 MRI Lumbar spine  1. Negative for imaging evidence of Koki Hale syndrome and other acute  cauda equina processes. Widely patent central spinal canal.     2.  Right greater than left posterior paraspinal muscle edema -- a common  finding that often reflects either chronic denervation edema or acute/subacute  muscle strain.     3. Straightened lumbar lordosis with minor lumbar scoliosis.     4. Potential for right L5 nerve root impingement in the extraforaminal zone.          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  - Neuro following, await further work up  - Continue IVIG, thiamine, FA  - CMV IGG elevated, IGM negative  - HIV, heavy metal screen pending  - TSH nL    Electrolyte abnormalities  - Replace lytes prn  - Mg pending  - Cont IVF, d/c K+ in IVF    Pulmonary-currently on RA  - Continue to monitor FVC-pt needs ongoing encouragement, 0.7 this am  - BiPAP prn, low threshold for intubation    · GI-elevated LFT likely fatty liver-improved  · Anxiety/Depression-psych consulted-celexa, seroquel, ativan prn  · Renal-AICHA, resolved             · Prophylaxis - DVT-lovenox  · Discussed in interdisciplinary rounds  · FULL CODE          [x]See my orders for details    My assessment/plan was discussed with:  [x]nursing []PT/OT    []respiratory therapy [x]   []family [x]pt         BRINDA Schneider

## 2017-05-09 NOTE — MED STUDENT NOTES
*ATTENTION:  This note has been created by a medical student for educational purposes only. Please do not refer to the content of this note for clinical decision-making, billing, or other purposes. Please see attending physicians note to obtain clinical information on this patient. *       Student Progress Note  Please refer to attendings daily rounding note for full details      Patient: Betzaida Toledo MRN: [id number]  CSN: [id number]    YOB: 1960  Age: 60 y.o. Sex: female    DOA: 4/26/2017 LOS:  LOS: 12 days          Chief Complaint: weakness      Subjective:    Pt seen today and is resting comfortably but complains of right ear pain still as well as progressive weakness in the neck and jaw that is worse than yesterday and difficulty breathing. Pt reports that she feels as though \"there is an elephant sitting on my chest\". Pt also still complains of a dry mouth feeling- \"sand in my mouth\". Objective:      Visit Vitals    BP (!) 130/103    Pulse 99    Temp 98.4 °F (36.9 °C)    Resp (!) 33    Ht 5' 4\" (1.626 m)    Wt 68.1 kg (150 lb 2.1 oz)    SpO2 99%    BMI 25.77 kg/m2         Physical Exam:  Gen: alert and oriented x4, stressed   HEENT: nontraumatic, normocephalic, trachea midline  CV: regular rhythm with periods of tachycardia, no murmurs, rubs or gallops  Resp: CTA x12, increased respiratory effort when talking and increased rate when awake, no wheezing, rales or rhonchi  Abd: non-distended, non jaundice, normal bowel sounds, no tenderness to palpation, no organomegaly  Extrem:  No peripheral edema in lower extremities bilaterally  Skin: normal color, texture and turgor   Neuro: muscle weakness in all four extremities, muscle strength: hip flexors 3/4 bilaterally, knee extensors 4/5 bilaterally, shoulder flexors and extensors 3/4 bilaterally, elbow flexors 3/4 bilaterally, wrist flexors and extensors 4/5 bilaterally.          I have reviewed the patient's Labs and Radiology studies. Lab Results   Component Value Date/Time    WBC 4.6 05/08/2017 09:13 AM    Hemoglobin (POC) 17.0 04/26/2017 09:34 PM    HGB 11.3 05/08/2017 09:13 AM    Hematocrit (POC) 50 04/26/2017 09:34 PM    HCT 32.0 05/08/2017 09:13 AM    PLATELET 640 96/11/7122 09:13 AM    MCV 87.0 05/08/2017 09:13 AM     Lab Results   Component Value Date/Time    Sodium 139 05/09/2017 06:25 AM    Potassium 5.0 05/09/2017 06:25 AM    Chloride 109 05/09/2017 06:25 AM    CO2 21 05/09/2017 06:25 AM    Anion gap 9 05/09/2017 06:25 AM    Glucose 93 05/09/2017 06:25 AM    BUN <1 05/09/2017 06:25 AM    Creatinine 0.30 05/09/2017 06:25 AM    BUN/Creatinine ratio Cannot be calulated 05/09/2017 06:25 AM    GFR est AA >60 05/09/2017 06:25 AM    GFR est non-AA >60 05/09/2017 06:25 AM    Calcium 8.1 05/09/2017 06:25 AM    Bilirubin, total 0.9 05/08/2017 09:13 AM    AST (SGOT) 44 05/08/2017 09:13 AM    Alk.  phosphatase 72 05/08/2017 09:13 AM    Protein, total 5.9 05/08/2017 09:13 AM    Albumin 2.4 05/08/2017 09:13 AM    Globulin 3.5 05/08/2017 09:13 AM    A-G Ratio 0.7 05/08/2017 09:13 AM    ALT (SGPT) 82 05/08/2017 09:13 AM       Intake/Output Summary (Last 24 hours) at 05/09/17 0958  Last data filed at 05/09/17 0804   Gross per 24 hour   Intake             2670 ml   Output             1925 ml   Net              745 ml       Current Facility-Administered Medications:     dextrose 5% and 0.9% NaCl infusion, 100 mL/hr, IntraVENous, CONTINUOUS, Kyle Kitchen, PA    ELECTROLYTE REPLACEMENT PROTOCOL, 1 Each, Other, PRN, Kyle Kitchen, PA    ELECTROLYTE REPLACEMENT PROTOCOL, 1 Each, Other, PRN, Kyle Kitchen, PA    ELECTROLYTE REPLACEMENT PROTOCOL, 1 Each, Other, PRN, Kyle Kitchen, PA    ELECTROLYTE REPLACEMENT PROTOCOL, 1 Each, Other, PRN, Kyle Kitchen, PA    ELECTROLYTE REPLACEMENT PROTOCOL, 1 Each, Other, PRN, Kyle Kitchen, PA    ELECTROLYTE REPLACEMENT PROTOCOL, 1 Each, Other, PRN, BRINDA Bhat    ELECTROLYTE REPLACEMENT PROTOCOL, 1 Each, Other, PRN, BRINDA Bhat    gabapentin (NEURONTIN) capsule 300 mg, 300 mg, Oral, TID, Param Medina MD, 300 mg at 05/09/17 0804    immune globulin 10% infusion 20 g, 20 g, IntraVENous, Q24H, Elba Seb, DO, Last Rate: 100 mL/hr at 05/08/17 1223, 20 g at 05/08/17 1223    senna (SENOKOT) tablet 17.2 mg, 2 Tab, Oral, QHS, Elba Seb, DO, 17.2 mg at 05/08/17 2126    oxyCODONE IR (ROXICODONE) tablet 10 mg, 10 mg, Oral, Q4H PRN, Elba Seb, DO, 10 mg at 47/73/41 7559    folic acid (FOLVITE) tablet 1 mg, 1 mg, Oral, DAILY, Marisa Delgado MD, 1 mg at 05/09/17 0804    immune globulin 10% (FLEBOGAMMA DIF) infusion 5 g, 5 g, IntraVENous, Q24H, Marisa Delgado MD, 5 g at 05/09/17 0756    thiamine (B-1) 100 mg in 0.9% sodium chloride 50 mL IVPB, 100 mg, IntraVENous, Q24H, Marisa Delgado MD, 100 mg at 05/08/17 1825    acetaminophen (TYLENOL) tablet 650 mg, 650 mg, Oral, Q4H PRN, Hanna Javier MD    scopolamine (TRANSDERM-SCOP) 1.5 mg, 1.5 mg, TransDERmal, Q72H, Conchetta Ogles, DO, 1.5 mg at 05/06/17 1513    QUEtiapine (SEROquel) tablet 50 mg, 50 mg, Oral, QHS, Conchetta Ogles, DO, 50 mg at 05/08/17 2140    citalopram (CELEXA) tablet 20 mg, 20 mg, Oral, DAILY, Conchetta Ogles, DO, 20 mg at 05/09/17 0804    benzocaine-menthol (CEPACOL) lozenge 1 Lozenge, 1 Lozenge, Mucous Membrane, PRN, Conchetta Ogles, DO, 1 Lozenge at 04/28/17 1659    enoxaparin (LOVENOX) injection 40 mg, 40 mg, SubCUTAneous, Q24H, Viktoriya Recio MD, 40 mg at 05/09/17 0511    ondansetron (ZOFRAN) injection 4 mg, 4 mg, IntraVENous, Q4H PRN, Syed Burch MD, 4 mg at 04/30/17 1159    LORazepam (ATIVAN) tablet 1 mg, 1 mg, Oral, Q6H PRN, Tiny Navas DO, 1 mg at 05/09/17 0817    oxymetazoline (AFRIN) 0.05 % nasal spray 2 Spray, 2 Spray, Both Nostrils, BID PRN, Freddy Hammer Jacey Johnson, DO    sodium chloride (NS) flush 5-10 mL, 5-10 mL, IntraVENous, PRN, Edni Garcia MD, 10 mL at 05/05/17 0544        Assessment:   Weakness:  Guillain barre syndrome  Thiamine Deficiency  Lyme Disease  CMV radiculitis   Myasthenia gravis crisis  Metabolic Myopathy  AICHA  LEMS  Poliomyelitis   Hypothyroid          Plan:   Weakness:      EKG      EMG      CBC, CMP, TSH, urine dip, CMV antibodies, Lyme test, Spinal tap with CSF analysis and culture, CT head and neck      Lalita EVANS [address]  5/9/2017  9:58 AM  *ATTENTION:  This note has been created by a medical student for educational purposes only. Please do not refer to the content of this note for clinical decision-making, billing, or other purposes. Please see attending physicians note to obtain clinical information on this patient. *

## 2017-05-09 NOTE — PROGRESS NOTES
2000 assumed care of pt.  2030 call light within reach. 2200 turned & repositioned. 0000 pt.sleeping.  0500 complete AM care given. Bedside and Verbal shift change report given to Christina0 Dena Urena (oncoming nurse) by Roberto Carlos Martínez RN (offgoing nurse). Report included the following information SBAR, Kardex, Intake/Output, MAR and Recent Results.

## 2017-05-09 NOTE — PROGRESS NOTES
[facility]  [facility] Division    Daily progress Note    Patient: Collette Corpus MRN: [id number]  CSN: [id number]    YOB: 1960  Age: 60 y.o.   Sex: female    DOA: 4/26/2017 LOS:  LOS: 12 days                    Subjective:     CC: weakness    Pt is alert and awake, NAD  C/o chronic weakness and Rt ear pain     Objective:      Visit Vitals    BP (!) 140/96    Pulse (!) 105    Temp 99.5 °F (37.5 °C)    Resp 18    Ht 5' 4\" (1.626 m)    Wt 68.1 kg (150 lb 2.1 oz)    SpO2 97%    BMI 25.77 kg/m2       Physical Exam:  NC/AT  NO JVD,TMG  S1/S2 RRR  CTAB, NO WHEEZING  NT,ND, NABS  NO EDEMA  MS 3+/5 in LE, 4/5 UE        Intake and Output:  Current Shift:  05/09 0701 - 05/09 1900  In: 1582.5 [P.O.:360; I.V.:1222.5]  Out: 1175 [Urine:1175]  Last three shifts:  05/07 1901 - 05/09 0700  In: 3605 [P.O.:480; I.V.:3125]  Out: 2635 [Urine:2635]    Recent Results (from the past 24 hour(s))   METABOLIC PANEL, BASIC    Collection Time: 05/09/17  6:25 AM   Result Value Ref Range    Sodium 139 136 - 145 mmol/L    Potassium 5.0 3.5 - 5.5 mmol/L    Chloride 109 (H) 100 - 108 mmol/L    CO2 21 21 - 32 mmol/L    Anion gap 9 3.0 - 18 mmol/L    Glucose 93 74 - 99 mg/dL    BUN <1 (L) 7.0 - 18 MG/DL    Creatinine 0.30 (L) 0.6 - 1.3 MG/DL    BUN/Creatinine ratio Cannot be calulated 12 - 20      GFR est AA >60 >60 ml/min/1.73m2    GFR est non-AA >60 >60 ml/min/1.73m2    Calcium 8.1 (L) 8.5 - 10.1 MG/DL   CALCIUM, IONIZED    Collection Time: 05/09/17  9:28 AM   Result Value Ref Range    Ionized Calcium 1.11 (L) 1.12 - 1.32 MMOL/L   MAGNESIUM    Collection Time: 05/09/17  9:28 AM   Result Value Ref Range    Magnesium 1.6 1.6 - 2.6 mg/dL         Current Facility-Administered Medications:     dextrose 5% and 0.9% NaCl infusion, 100 mL/hr, IntraVENous, CONTINUOUS, BRINDA Church, Last Rate: 100 mL/hr at 05/09/17 1029, 100 mL/hr at 05/09/17 1029    pseudoephedrine (SUDAFED) tablet 30 mg, 30 mg, Oral, Q6H, Nilay Patrick MD, 30 mg at 05/09/17 1735    doxycycline (MONODOX) capsule 100 mg, 100 mg, Oral, Q12H, Robert Pedro MD    ELECTROLYTE REPLACEMENT PROTOCOL, 1 Each, Other, PRN, Jacobo Del Toro, PA    ELECTROLYTE REPLACEMENT PROTOCOL, 1 Each, Other, PRN, [place] Garter, PA    ELECTROLYTE REPLACEMENT PROTOCOL, 1 Each, Other, PRN, Jacobo Dougieter, PA    ELECTROLYTE REPLACEMENT PROTOCOL, 1 Each, Other, PRN, Jacobo Katey, PA    ELECTROLYTE REPLACEMENT PROTOCOL, 1 Each, Other, PRN, Jacobo eDl Toro, PA    ELECTROLYTE REPLACEMENT PROTOCOL, 1 Each, Other, PRN, Jacobo Del Toro, PA    ELECTROLYTE REPLACEMENT PROTOCOL, 1 Each, Other, PRN, BRINDA Orellana    gabapentin (NEURONTIN) capsule 300 mg, 300 mg, Oral, TID, Cecilio Cordova MD, 300 mg at 05/09/17 1509    immune globulin 10% infusion 20 g, 20 g, IntraVENous, Q24H, [place]kin Nephlucy, , Last Rate: 20.55 mL/hr at 05/09/17 1024, 20 g at 05/09/17 1024    senna (SENOKOT) tablet 17.2 mg, 2 Tab, Oral, QHS, [place] Neph, DO, 17.2 mg at 05/08/17 2126    oxyCODONE IR (ROXICODONE) tablet 10 mg, 10 mg, Oral, Q4H PRN, [place] Nephlucy, DO, 10 mg at 17/86/19 2254    folic acid (FOLVITE) tablet 1 mg, 1 mg, Oral, DAILY, Ken Booker MD, 1 mg at 05/09/17 0804    immune globulin 10% (FLEBOGAMMA DIF) infusion 5 g, 5 g, IntraVENous, Q24H, Ken Booker MD, 5 g at 05/09/17 0756    thiamine (B-1) 100 mg in 0.9% sodium chloride 50 mL IVPB, 100 mg, IntraVENous, Q24H, Ken Booker MD, 100 mg at 05/09/17 1509    acetaminophen (TYLENOL) tablet 650 mg, 650 mg, Oral, Q4H PRN, Bar Duke MD    scopolamine (TRANSDERM-SCOP) 1.5 mg, 1.5 mg, TransDERmal, Q72H, Kayla Orn, DO, 1.5 mg at 05/09/17 1417    QUEtiapine (SEROquel) tablet 50 mg, 50 mg, Oral, QHS, Kayla Orn, DO, 50 mg at 05/08/17 2140    citalopram (CELEXA) tablet 20 mg, 20 mg, Oral, DAILY, Maya Carreon Claria Silvestre, DO, 20 mg at 05/09/17 0804    benzocaine-menthol (CEPACOL) lozenge 1 Lozenge, 1 Lozenge, Mucous Membrane, PRN, Janneth Glass, DO, 1 Lozenge at 04/28/17 1659    enoxaparin (LOVENOX) injection 40 mg, 40 mg, SubCUTAneous, Q24H, Viktoriya Recio MD, 40 mg at 05/09/17 0511    ondansetron (ZOFRAN) injection 4 mg, 4 mg, IntraVENous, Q4H PRN, Zac Eduardo MD, 4 mg at 04/30/17 1159    LORazepam (ATIVAN) tablet 1 mg, 1 mg, Oral, Q6H PRN, Janneth Glass DO, 1 mg at 05/09/17 1509    oxymetazoline (AFRIN) 0.05 % nasal spray 2 Spray, 2 Spray, Both Nostrils, BID PRN, Janneth Glass, DO    sodium chloride (NS) flush 5-10 mL, 5-10 mL, IntraVENous, PRN, Teo Harmon MD, 10 mL at 05/05/17 0544    Lab Results   Component Value Date/Time    Glucose 93 05/09/2017 06:25 AM    Glucose 79 05/08/2017 09:13 AM    Glucose 128 05/07/2017 03:15 AM    Glucose 69 05/06/2017 04:30 AM    Glucose 83 05/02/2017 05:00 AM        Assessment/Plan     Principal Problem:    Encephalopathy (4/27/2017)    Active Problems:    Dehydration (4/27/2017)      Hypokalemia (4/27/2017)      Lactic acidosis (4/27/2017)      [facility] (4/27/2017)      Leukocytosis (4/27/2017)      UTI (urinary tract infection) (4/27/2017)      Elevated liver enzymes (4/27/2017)      Acute inflammatory polyneuropathy (HCC) (5/5/2017)      Axonal Guillain-Crawford syndrome ([facility] (5/5/2017)      - GBS  Axonal Type  On IVIG    - UTI  - Transaminitis Likely 2/2 Fatty Liver  - COPD  - Dysphagia  - Depression w/ Possible Component of Psychosis, on celexa and seroquel   - Acute Metabolic Encephalopathy, resolved  - AICHA, resolved  - Replete lytes   - otitis media, PCN and levaquin allergy  Start lisset Vizcaino MD  5/9/2017, 4:17 PM

## 2017-05-09 NOTE — PROGRESS NOTES
Nutrition RE-ASSESSMENT /GLYCEMIC CONTROL   Plan of care      RECOMMENDATIONS:   Pt was previously on Ensure, not currently ordered. She does not want it to be resumed. Encouraged increased intake at meals to meet calorie and protein requirements. GOALS:   PO intake meets >75% of protein/calorie needs by 5/14    Weight Maintenance (+/- 1-2 lb by 5/14)       ASSESSMENT:   Weight status is classified as overweight per BMI of 25.9. I observed intake of the breakfast meal <25% of meal.    Patient with  hypoalbuminemia as evidenced by albumin =2. 4.  Nutrient deficit related to Axonal Guillain- [place] syndrome as evidenced by inadequate intake at meals this admission. Altered nutrition-related lab values: pt meets criteria for prediabetes as evidenced by A1c of 5.9%, BG well controlled this admission without insulin. SUBJECTIVE/OBJECTIVE:    Information Obtained from: Pt stated she can only eat out of one side of her mouth and is eating as much as she can. [x] Pt and Interdisciplinary Meeting/Rounds  Diet:Dental Soft diet, ground meats  Medications: [x] Reviewed    Allergies: [x] Reviewed   Encounter Diagnoses     ICD-10-CM ICD-9-CM   1. Acute hypokalemia E87.6 276.8   2. Lactic acidosis E87.2 276.2   3. Dehydration E86.0 276.51   4.  Dysphagia, unspecified type R13.10 787.20     Past Medical History:   Diagnosis Date    Allergic rhinitis     Angina pectoris ([facility])     Anxiety     Bronchitis     Chronic fatigue syndrome     Chronic pain     COPD (chronic obstructive pulmonary disease) ([facility])     Generalized pain     Headache, tension-type     HTN (hypertension)     Hyperlipidemia     Vitamin D deficiency       Labs:    Lab Results   Component Value Date/Time    Sodium 139 05/09/2017 06:25 AM    Potassium 5.0 05/09/2017 06:25 AM    Chloride 109 05/09/2017 06:25 AM    CO2 21 05/09/2017 06:25 AM    Anion gap 9 05/09/2017 06:25 AM    Glucose 93 05/09/2017 06:25 AM    BUN <1 05/09/2017 06:25 AM Creatinine 0.30 05/09/2017 06:25 AM    Calcium 8.1 05/09/2017 06:25 AM    Magnesium 1.6 05/09/2017 09:28 AM    Phosphorus 3.3 05/08/2017 09:13 AM    Albumin 2.4 05/08/2017 09:13 AM     Anthropometrics: BMI (calculated): 25.8  Last 3 Recorded Weights in this Encounter    05/04/17 1921 05/06/17 0330 05/06/17 0600   Weight: 68.5 kg (151 lb) 68.1 kg (150 lb 2.1 oz) 68.1 kg (150 lb 2.1 oz)      Ht Readings from Last 1 Encounters:   05/06/17 5' 4\" (1.626 m)     Patient Vitals for the past 100 hrs:   % Diet Eaten   05/09/17 1200 25 %   05/08/17 0911 25 %   05/05/17 1300 0 %     IBW: 120 lb %IBW: 126%    Nutrition Needs:   Calories: 3487-0005 Kcal   Protein:   70-82 g      [x] No Cultural, Jain or ethnic dietary need identified.     Wt Status:  [] Normal (18.6 - 24.9) [] Underweight (<18.5)   [x] Overweight (25 - 29.9) [] Mild Obesity (30 - 34.9)  [] Moderate Obesity (35 - 39.9) [] Morbid Obesity (40+)     Nutrition Problems Identified:   [x] Suboptimal PO intake   [x] Difficulty chewing/swallowing  Plan:   [x] Therapeutic Diet  [x] Monitor PO intake on meal rounds   [x]  Continue inpatient monitoring and intervention   [x]  Participated in discharge planning/Interdisciplinary rounds/Team meetings   []  Other:     Nutrition Monitoring and Evaluation:  [x] Continue ongoing monitoring and intervention  [] Other  Nutrition Risk:  [] High  [x] Moderate []  Low  Ana Reyes RD

## 2017-05-09 NOTE — PROGRESS NOTES
Spoke with Dr. Jennifer Brownlee re need for new psych consult to address need for definitive psych diagnosis to process with level 2 screening, which is needed for long term nursing home placement. Received order for new psych consult, order entered. Janette Howard RN,ext. [phone].

## 2017-05-09 NOTE — PROGRESS NOTES
0908: Received patient awake, alert, oriented, able to follow commands. Pt stated she felt too weak to breathe into spirometer for forced vital capacity test. Was unable to perform FVC this morning because pt refused to perform it stating she felt too weak and would not be able to perform the test properly. 1355: Pt awake, alert, oriented in bed. FVC performed. 0.8 L was best attempt of 3 (her 1st attempt).  2nd and 3rd attempts measured 0.5 L

## 2017-05-09 NOTE — PROGRESS NOTES
Neurology Progress Note    Admit Date: 4/26/2017  Length of Stay: 12  Primary Care: Marcello Myers MD       Assessment:    Principle Problem: Encephalopathy     Problem List: Principal Problem:    Encephalopathy (4/27/2017)    Active Problems:    Dehydration (4/27/2017)      Hypokalemia (4/27/2017)      Lactic acidosis (4/27/2017)      Polycythemia ([address] (4/27/2017)      Leukocytosis (4/27/2017)      UTI (urinary tract infection) (4/27/2017)      Elevated liver enzymes (4/27/2017)      Acute inflammatory polyneuropathy ([address] (5/5/2017)      Axonal Guillain-Benicia syndrome ([address] (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 Continue monitoring respiratory parameters per pulmonary. She is also a bit anxious and may be elaborating on some of her symptoms.          Interim History: Both yesterday and today she reports worsening of her symptoms. When I asked her to explain that she reported that her jaw is hard to move though she talks well enough and has good jaw opening strength. She also said she feels her hands are weaker but she did a little more for me today. No concrete changes in exam.     Initial Hx: Around 4/16/2017 she stopped eating because she was nauseas and anytime she ate she would vomit. There was no diarrhea. On Sunday 4/23/2017 the patient developed ataxia. That evening she fell and did not have the strength to get back up . She crawled from the bathroom to her bed and her boyfriend found her down and put her into bed. Her boyfriend called EMS but she refused to come to the ER. Finally she came to the ER on 4/26/2017 after being brought by the police. At that time her lower extremity weakness had worsened and she had started to develop weakness of her upper extremities. This apparently progressed during her stay.          Discussed with: Dr. Noreen Diamond    Allergies: Allergies   Allergen Reactions    Aspirin Other (comments)     Stomach cramps    Ibuprofen Not Reported This Time    Levaquin [Levofloxacin] Not Reported This Time           Mobic [Meloxicam] Not Reported This Time    Pcn [Penicillins] Not Reported This Time    Plaquenil [Hydroxychloroquine] Not Reported This Time         Vital Signs:   Visit Vitals    /89    Pulse (!) 102    Temp 99 °F (37.2 °C)    Resp 21    Ht 5' 4\" (1.626 m)    Wt 68.1 kg (150 lb 2.1 oz)    SpO2 98%    BMI 25.77 kg/m2        Neurological examination:   · Appearance: In no acute distress,   ·    · Mental status examination: Alert and oriented X 3. Mild dysarthria. Cranial Nerves:            II: visual fields Full to confrontation   II: pupils Equal, round, reactive to light   III,VII: ptosis none   III,IV,VI: extraocular muscles  Full ROM   V: facial light touch sensation      V,VII: corneal reflex      VII: facial muscle function  Normal smile but reports she cannot elevate her eyebrows. VIII: hearing     IX: soft palate elevation      X phonation     XI: sternocleidomastoid strength     XII: tongue strength         · Motor exam: Station, gait: deferred  Wiggles ankles bilaterally. No proximal LE antigravity strength. Can move hands and flex elbows against gravity but no proximal upper extremity antigravity strength. ·    · Reflexes: Absent reflexes in biceps, brachioradialis, knees and ankles.           Review of Systems:   Y  N   Constitutional: [] [] recent weight change  [] [] fever  [] [] sleep difficulties   ENT/Mouth:  [] [] hearing loss  [] [] swallowing problems   Cardiovascular:  [] [] chest pain   [] [] palpitations    Respiratory: [] [x] cough   [] [] shortness of breath  [] [] sleep apnea  [] [] intubated   Gastrointestinal: [] [] abdominal pain  [] [x] nausea   Genitourinary: [] [] frequent urination  [] [] incontinence    Musculoskeletal:   [] [] joint pain  [] [] muscle pain   Integument:   [] [] rash/itching   Neurological:  [] [] dizziness/vertigo  [] [x] speech problems  [] [] language difficulty  [] [] sedation  [] [] confusion  [] [] agitation/combativeness  [] [] loss of consciousness  [] [] numbness/tingling sensation  [] [] tremors  [] [] weakness in limbs  [] [] difficulty with balance  [] [x] frequent or recurring headaches  [] [] memory loss   [] [] comatose  [] [] seizures   Psychiatric:   [] [] depression  [] [] hallucinations           PMH:   Past Medical History:   Diagnosis Date    Allergic rhinitis     Angina pectoris (HCC)     Anxiety     Bronchitis     Chronic fatigue syndrome     Chronic pain     [facility] disease[facility]     Generalized pain     Headache, tension-type     HTN (hypertension)     Hyperlipidemia     Hypokalemia     Insomnia     Leg swelling     Lyme disease     Nicotine dependence     OA (osteoarthritis)     [facility]     Vertigo     Vitamin D deficiency       FH:   Family History   Problem Relation Age of Onset    Colon Cancer Other     Cancer Other      lung      SH:   Social History     Social History    Marital status:      Spouse name: N/A    Number of children: N/A    Years of education: N/A     Social History Main Topics    Smoking status: Current Every Day Smoker     Packs/day: 0.50     Years: 30.00    Smokeless tobacco: Never Used    Alcohol use No    Drug use: No    Sexual activity: Not Asked     Other Topics Concern    None     Social History Narrative          Medications:    [x] REVIEWED  Current Facility-Administered Medications   Medication Dose Route Frequency    dextrose 5% and 0.9% NaCl infusion  100 mL/hr IntraVENous CONTINUOUS    pseudoephedrine (SUDAFED) tablet 30 mg  30 mg Oral Q6H    doxycycline (MONODOX) capsule 100 mg  100 mg Oral Q12H    ELECTROLYTE REPLACEMENT PROTOCOL  1 Each Other PRN    ELECTROLYTE REPLACEMENT PROTOCOL  1 Each Other PRN    ELECTROLYTE REPLACEMENT PROTOCOL  1 Each Other PRN    ELECTROLYTE REPLACEMENT PROTOCOL  1 Each Other PRN    ELECTROLYTE REPLACEMENT PROTOCOL  1 Each Other PRN    ELECTROLYTE REPLACEMENT PROTOCOL  1 Each Other PRN    ELECTROLYTE REPLACEMENT PROTOCOL  1 Each Other PRN    gabapentin (NEURONTIN) capsule 300 mg  300 mg Oral TID    immune globulin 10% infusion 20 g  20 g IntraVENous Q24H    senna (SENOKOT) tablet 17.2 mg  2 Tab Oral QHS    oxyCODONE IR (ROXICODONE) tablet 10 mg  10 mg Oral J8I PRN    folic acid (FOLVITE) tablet 1 mg  1 mg Oral DAILY    immune globulin 10% (FLEBOGAMMA DIF) infusion 5 g  5 g IntraVENous Q24H    thiamine (B-1) 100 mg in 0.9% sodium chloride 50 mL IVPB  100 mg IntraVENous Q24H    acetaminophen (TYLENOL) tablet 650 mg  650 mg Oral Q4H PRN    scopolamine (TRANSDERM-SCOP) 1.5 mg  1.5 mg TransDERmal Q72H    QUEtiapine (SEROquel) tablet 50 mg  50 mg Oral QHS    citalopram (CELEXA) tablet 20 mg  20 mg Oral DAILY    benzocaine-menthol (CEPACOL) lozenge 1 Lozenge  1 Lozenge Mucous Membrane PRN    enoxaparin (LOVENOX) injection 40 mg  40 mg SubCUTAneous Q24H    ondansetron (ZOFRAN) injection 4 mg  4 mg IntraVENous Q4H PRN    LORazepam (ATIVAN) tablet 1 mg  1 mg Oral Q6H PRN    oxymetazoline (AFRIN) 0.05 % nasal spray 2 Spray  2 Spray Both Nostrils BID PRN    sodium chloride (NS) flush 5-10 mL  5-10 mL IntraVENous PRN      Data:      [x] REVIEWED  Recent Results (from the past 24 hour(s))   METABOLIC PANEL, BASIC    Collection Time: 05/09/17  6:25 AM   Result Value Ref Range    Sodium 139 136 - 145 mmol/L    Potassium 5.0 3.5 - 5.5 mmol/L    Chloride 109 (H) 100 - 108 mmol/L    CO2 21 21 - 32 mmol/L    Anion gap 9 3.0 - 18 mmol/L    Glucose 93 74 - 99 mg/dL    BUN <1 (L) 7.0 - 18 MG/DL    Creatinine 0.30 (L) 0.6 - 1.3 MG/DL    BUN/Creatinine ratio Cannot be calulated 12 - 20      GFR est AA >60 >60 ml/min/1.73m2    GFR est non-AA >60 >60 ml/min/1.73m2    Calcium 8.1 (L) 8.5 - 10.1 MG/DL CALCIUM, IONIZED    Collection Time: 05/09/17  9:28 AM   Result Value Ref Range    Ionized Calcium 1.11 (L) 1.12 - 1.32 MMOL/L   MAGNESIUM    Collection Time: 05/09/17  9:28 AM   Result Value Ref Range    Magnesium 1.6 1.6 - 2.6 mg/dL

## 2017-05-10 LAB
B BURGDOR IGG PATRN SER IB-IMP: NEGATIVE
B BURGDOR IGG+IGM SER-ACNC: 1.47 ISR (ref 0–0.9)
B BURGDOR IGM PATRN SER IB-IMP: NEGATIVE
B BURGDOR18KD IGG SER QL IB: PRESENT
B BURGDOR23KD IGG SER QL IB: ABNORMAL
B BURGDOR23KD IGM SER QL IB: PRESENT
B BURGDOR28KD IGG SER QL IB: ABNORMAL
B BURGDOR30KD IGG SER QL IB: ABNORMAL
B BURGDOR39KD IGG SER QL IB: PRESENT
B BURGDOR39KD IGM SER QL IB: ABNORMAL
B BURGDOR41KD IGG SER QL IB: ABNORMAL
B BURGDOR41KD IGM SER QL IB: ABNORMAL
B BURGDOR45KD IGG SER QL IB: ABNORMAL
B BURGDOR58KD IGG SER QL IB: PRESENT
B BURGDOR66KD IGG SER QL IB: PRESENT
B BURGDOR93KD IGG SER QL IB: ABNORMAL
BACTERIA SPEC CULT: NORMAL
GRAM STN SPEC: NORMAL
GRAM STN SPEC: NORMAL
METHYLMALONATE SERPL-SCNC: 82 NMOL/L (ref 0–378)
SERVICE CMNT-IMP: NORMAL
WNV IGG SPEC QL IA: NEGATIVE
WNV IGM CSF QL IA: NEGATIVE

## 2017-05-10 PROCEDURE — 74011250636 HC RX REV CODE- 250/636: Performed by: FAMILY MEDICINE

## 2017-05-10 PROCEDURE — 74011250636 HC RX REV CODE- 250/636: Performed by: HOSPITALIST

## 2017-05-10 PROCEDURE — 74011250636 HC RX REV CODE- 250/636: Performed by: PSYCHIATRY & NEUROLOGY

## 2017-05-10 PROCEDURE — 74011250637 HC RX REV CODE- 250/637: Performed by: PSYCHIATRY & NEUROLOGY

## 2017-05-10 PROCEDURE — 74011250637 HC RX REV CODE- 250/637: Performed by: INTERNAL MEDICINE

## 2017-05-10 PROCEDURE — 97535 SELF CARE MNGMENT TRAINING: CPT

## 2017-05-10 PROCEDURE — 74011000258 HC RX REV CODE- 258: Performed by: PHYSICIAN ASSISTANT

## 2017-05-10 PROCEDURE — 97110 THERAPEUTIC EXERCISES: CPT

## 2017-05-10 PROCEDURE — 77030020245 HC SOL INJ 5% D/0.9%NACL

## 2017-05-10 PROCEDURE — 74011250637 HC RX REV CODE- 250/637: Performed by: HOSPITALIST

## 2017-05-10 PROCEDURE — 65660000000 HC RM CCU STEPDOWN

## 2017-05-10 RX ORDER — GABAPENTIN 300 MG/1
600 CAPSULE ORAL 3 TIMES DAILY
Status: DISCONTINUED | OUTPATIENT
Start: 2017-05-10 | End: 2017-05-19 | Stop reason: HOSPADM

## 2017-05-10 RX ADMIN — OXYCODONE HYDROCHLORIDE 10 MG: 5 TABLET ORAL at 18:33

## 2017-05-10 RX ADMIN — DOXYCYCLINE 100 MG: 100 CAPSULE ORAL at 21:16

## 2017-05-10 RX ADMIN — PSEUDOEPHEDRINE HCL 30 MG: 30 TABLET, FILM COATED ORAL at 07:37

## 2017-05-10 RX ADMIN — IMMUNE GLOBULIN INTRAVENOUS (HUMAN) 20 G: 5 INJECTION, SOLUTION INTRAVENOUS at 08:14

## 2017-05-10 RX ADMIN — QUETIAPINE FUMARATE 50 MG: 25 TABLET, FILM COATED ORAL at 21:16

## 2017-05-10 RX ADMIN — CITALOPRAM HYDROBROMIDE 20 MG: 20 TABLET ORAL at 07:40

## 2017-05-10 RX ADMIN — OXYCODONE HYDROCHLORIDE 10 MG: 5 TABLET ORAL at 07:40

## 2017-05-10 RX ADMIN — GABAPENTIN 300 MG: 300 CAPSULE ORAL at 07:40

## 2017-05-10 RX ADMIN — DEXTROSE MONOHYDRATE AND SODIUM CHLORIDE 100 ML/HR: 5; .9 INJECTION, SOLUTION INTRAVENOUS at 07:36

## 2017-05-10 RX ADMIN — FOLIC ACID 1 MG: 1 TABLET ORAL at 07:40

## 2017-05-10 RX ADMIN — PSEUDOEPHEDRINE HCL 30 MG: 30 TABLET, FILM COATED ORAL at 18:00

## 2017-05-10 RX ADMIN — DOXYCYCLINE 100 MG: 100 CAPSULE ORAL at 07:42

## 2017-05-10 RX ADMIN — IMMUNE GLOBULIN INTRAVENOUS (HUMAN) 5 G: 5 INJECTION, SOLUTION INTRAVENOUS at 07:37

## 2017-05-10 RX ADMIN — SENNOSIDES 17.2 MG: 8.6 TABLET, FILM COATED ORAL at 21:16

## 2017-05-10 RX ADMIN — OXYCODONE HYDROCHLORIDE 10 MG: 5 TABLET ORAL at 12:23

## 2017-05-10 RX ADMIN — ENOXAPARIN SODIUM 40 MG: 40 INJECTION SUBCUTANEOUS at 05:23

## 2017-05-10 RX ADMIN — GABAPENTIN 600 MG: 300 CAPSULE ORAL at 18:33

## 2017-05-10 NOTE — PROGRESS NOTES
INFECTIOUS DISEASE FOLLOW UP NOTE :-     Admit Date: 4/26/2017    Current abx Prior abx   None Cefepime 4/28 - 0, Ceftriaxone 4/29 - 1      ASSESSMENT - > REC:      Guilain Newport syndrome  - suspect precipitating factor was acute viral pharyngitis (7 days PTA) No recent flu-like illness, diarrheal episodes, no recent vaccinations, no recent travel  - currently no evidence of pharyngeal inflammation or any other active infection -> IVIG per Neurology  -> no antibacterial therapy indicated at this time  -> send stool cx which will cover campylobacter (last BM 5/6)  -> CMV IgG sig elevated but neg IgM as for Lyme- s/o old disease   Severe Hypokalemia on admission  - due to poor po intake  - corrected     Abnormal CSF  - elevated protein, normal glucose without leukorrhachia  - c/w GBS - CSF studies not s/o infection but await Cx   Abnormal LFT's  - has diffuse hepatic steatosis on CT +/- dehydration  - transaminases, alk phos improving - Slightly better   HTN      COPD     OA/ RA     Hyperlipidemia     ? Chronic Fatigue Syndrome     h/o Lyme Disease  - dx'd ~ 2001 rx'd w/ 2 1-month courses doxycyline 2004 and 2006 and subsequently w/ multiple persistent complaints.  multiple + IgG western blots and negative IgM western blots dating back to 2003 (per Dr. Iam Panda - [place] [facility], 2011: no further treatment recommended) - IgG positive and IgM negative- as expected with her h/o Lyme in past      MICROBIOLOGY:   4/26 blcx NG x 2          urcx  NG  MRSA scrn neg  5/5 CSF cx ntd     LINES AND CATHETERS:   piv     MEDICATIONS:     Current Facility-Administered Medications   Medication Dose Route Frequency    dextrose 5% and 0.9% NaCl infusion  100 mL/hr IntraVENous CONTINUOUS    pseudoephedrine (SUDAFED) tablet 30 mg  30 mg Oral Q6H    doxycycline (MONODOX) capsule 100 mg  100 mg Oral Q12H    ELECTROLYTE REPLACEMENT PROTOCOL  1 Each Other PRN    ELECTROLYTE REPLACEMENT PROTOCOL  1 Each Other PRN    ELECTROLYTE REPLACEMENT PROTOCOL  1 Each Other PRN    ELECTROLYTE REPLACEMENT PROTOCOL  1 Each Other PRN    ELECTROLYTE REPLACEMENT PROTOCOL  1 Each Other PRN    ELECTROLYTE REPLACEMENT PROTOCOL  1 Each Other PRN    ELECTROLYTE REPLACEMENT PROTOCOL  1 Each Other PRN    gabapentin (NEURONTIN) capsule 300 mg  300 mg Oral TID    senna (SENOKOT) tablet 17.2 mg  2 Tab Oral QHS    oxyCODONE IR (ROXICODONE) tablet 10 mg  10 mg Oral O0E PRN    folic acid (FOLVITE) tablet 1 mg  1 mg Oral DAILY    thiamine (B-1) 100 mg in 0.9% sodium chloride 50 mL IVPB  100 mg IntraVENous Q24H    acetaminophen (TYLENOL) tablet 650 mg  650 mg Oral Q4H PRN    scopolamine (TRANSDERM-SCOP) 1.5 mg  1.5 mg TransDERmal Q72H    QUEtiapine (SEROquel) tablet 50 mg  50 mg Oral QHS    citalopram (CELEXA) tablet 20 mg  20 mg Oral DAILY    benzocaine-menthol (CEPACOL) lozenge 1 Lozenge  1 Lozenge Mucous Membrane PRN    enoxaparin (LOVENOX) injection 40 mg  40 mg SubCUTAneous Q24H    ondansetron (ZOFRAN) injection 4 mg  4 mg IntraVENous Q4H PRN    LORazepam (ATIVAN) tablet 1 mg  1 mg Oral Q6H PRN    oxymetazoline (AFRIN) 0.05 % nasal spray 2 Spray  2 Spray Both Nostrils BID PRN    sodium chloride (NS) flush 5-10 mL  5-10 mL IntraVENous PRN       SUBJECTIVE :     Interval notes reviewed. Afebrile. Out of icu today. She denies travel or mosquito bite to suggest [address] exposure. She feels paralyzed from neck down but does have some voluntary movement of L>RUE and feet but not well controlled. Pain remains an issue also. Review of MAR reveals doxy x 7 doses ordered yesterday by Dr. Laurie Brewster.       OBJECTIVE     Visit Vitals    /73 (BP 1 Location: Left arm)    Pulse (!) 102    Temp 98.2 °F (36.8 °C)    Resp 16    Ht 5' 4\" (1.626 m)    Wt 68.1 kg (150 lb 2.1 oz)    SpO2 97%    BMI 25.77 kg/m2       Temp (24hrs), Av.6 °F (37 °C), Min:97.5 °F (36.4 °C), Max:99.5 °F (37.5 °C)    Gen-WDWN WF sitting up in bed  HENT- No thrush  Chest- Symmetric expansion, CTA  CV-S1S2 RR, No JVD, No edema  Abd-Soft, NT, ND, BS+  Skin-No jaundice, cyanosis, clubbing. No decubitus  Neuro: awake and oriented, bilateral upper and lower extremity weakness, Rt>left, no NR, . Labs: Results:   Chemistry Recent Labs      05/09/17   0625  05/08/17   0913   GLU  93  79   NA  139  143   K  5.0  3.6   CL  109*  111*   CO2  21  21   BUN  <1*  1*   CREA  0.30*  0.32*   CA  8.1*  8.0*   AGAP  9  11   BUCR  Cannot be calulated  3*   AP   --   72   TP   --   5.9*   ALB   --   2.4*   GLOB   --   3.5   AGRAT   --   0.7*      CBC w/Diff Recent Labs      05/08/17   0913   WBC  4.6   RBC  3.68*   HGB  11.3*   HCT  32.0*   PLT  398   GRANS  56   LYMPH  36   EOS  1          RADIOLOGY :          Imaging    Results from [place] encounter on 04/26/17   XR SPINAL PUNC LUMB DX   Narrative History:Guillian Maywood    COMPARISON: MRI brain 4/29/2017. Informed consent and utilizing sterile technique and local anesthetic a  diagnostic lumbar puncture was performed under fluoroscopy utilizing a posterior  midline approach at L2-3. A 20-gauge needle was used. 4 tubes of clear CSF  forwarded to laboratory. No complications. Timeout performed at 0908. Fluoro time:  3.7 minutes  Radiation dose: (Reference air kerma): 162.89 mGy    Fluoroscopy was used for guidance to document position of the needle with images  saved under fluoroscopy. Impression IMPRESSION:  1. Diagnostic fluoroscopic lumbar puncture performed without complication. Patient transported back to the brower. Results from [place] encounter on 04/26/17   CT ABD PELV W CONT   Narrative CT OF THE ABDOMEN AND PELVIS, WITH CONTRAST    INDICATION: Upper abdominal pain and abnormal LFTs    COMPARISON: 8/8/2016.     TECHNIQUE: Standard helical CT performed through the abdomen and pelvis with IV  contrast.  Coronal and sagittal reformations were generated. One or more dose reduction techniques were used on this CT: automated exposure  control, adjustment of the mAs and/or kVp according to patient's size, and  iterative reconstruction techniques. The specific techniques utilized on this CT  exam have been documented in the patient's electronic medical record.    ============    FINDINGS:    INFERIOR THORAX: Moderate right and mild left dependent atelectasis. No acute  pulmonary infiltrate. Mild global cardiomegaly. No pericardial effusion. LIVER/BILIARY: Diffuse hepatic steatosis. No suspicious liver lesion. No biliary  dilation. Punctate gallstones with no secondary findings of cholecystitis. SPLEEN: Normal.    PANCREAS: Normal.    ADRENALS: Normal.    KIDNEYS: Exophytic right upper pole renal cyst. No acute or suspicious renal  abnormality. Kal Frankel LYMPH NODES: No mesenteric or retroperitoneal lymphadenopathy. GI TRACT: No wall thickening or dilation. Small hiatal hernia. Normal appendix. Scattered colonic diverticulosis, with no findings of an acute diverticulitis. VASCULATURE: Normal caliber distal thoracic and abdominal aorta. PELVIC ORGANS: Haley catheter is present within the nondistended bladder. Heterogeneously enhancing endometrial complex. BONES: No acute osseous abnormality. Degenerative changes of the thoracic spine. OTHER: No ascites or free intraperitoneal air.    ============         Impression IMPRESSION:    No acute or focal abnormality to explain patient's abdominal pain. There is  diffuse hepatic steatosis. Cholelithiasis. Colonic diverticulosis with no  findings of an acute diverticulitis. Note: Preliminary report sent to the Emergency Department by the radiology  resident at the time of the study.           Ekaterina Santos MD  May 10, 2017    [facility] AT THE [facility]  [phone]

## 2017-05-10 NOTE — PROGRESS NOTES
0700 received patient from [employer]. Patient resting with eyes closed. Appears asleep. 0740 Patient c/o generalized pain given PRN pain medication see MAR    1641 Patient to be transferred to floor per THE [facility] MD     1045 . TRANSFER to 2W    Verbal report given to [employer] (2W) from [facility]  being transferred to 2W room 2113(unit) for routine progression of care       Report consisted of patients Situation, Background, Assessment and   Recommendations(SBAR). Information from the following report(s) SBAR, Kardex, ED Summary, Intake/Output, MAR, Recent Results and Cardiac Rhythm NSR/ST was reviewed with the receiving nurse. Lines:   Peripheral IV 05/06/17 Right; Lower Arm (Active)   Site Assessment Clean, dry, & intact 5/10/2017 10:00 AM   Phlebitis Assessment 0 5/10/2017 10:00 AM   Infiltration Assessment 0 5/10/2017 10:00 AM   Dressing Status Clean, dry, & intact 5/10/2017 10:00 AM   Dressing Type Transparent 5/10/2017  2:00 AM   Hub Color/Line Status Pink; Infusing 5/9/2017  8:00 AM   Action Taken Open ports on tubing capped 5/7/2017 12:00 PM   Alcohol Cap Used Yes 5/9/2017  8:00 AM        Opportunity for questions and clarification was provided.       Patient transported with:   Registered Nurse

## 2017-05-10 NOTE — MED STUDENT NOTES
*ATTENTION:  This note has been created by a medical student for educational purposes only. Please do not refer to the content of this note for clinical decision-making, billing, or other purposes. Please see attending physicians note to obtain clinical information on this patient. *       Student Progress Note  Please refer to attendings daily rounding note for full details      Patient: Angelito Joel MRN: [id number]  CSN: [id number]    YOB: 1960  Age: 60 y.o. Sex: female    DOA: 4/26/2017 LOS:  LOS: 13 days          Chief Complaint:  weakness    Subjective:      Pt states that she is doing slightly better than yesterday but still \"feels like I am wearing a suit of armour\". She reports now being able to abduct the right arm but not able to control the right forearm. She has slightly better movement of her right hand as well. States that her jaw and mouth feel better today and she able to move them. She no longer has difficulty breathing and does not have any chest tightness like she did yesterday.      Pt is sitting up in bed, NAD watching tv    Objective:      Visit Vitals    BP (!) 135/94    Pulse (!) 106    Temp 99.1 °F (37.3 °C)    Resp 14    Ht 5' 4\" (1.626 m)    Wt 68.1 kg (150 lb 2.1 oz)    SpO2 98%    BMI 25.77 kg/m2         Physical Exam:  Gen: alert and oriented x4, NAD  HEENT: nontraumatic, normocephalic, missing numerous teeth, trachea midline  CV: RRR x4, no murmurs, rubs or gallops  Resp: CTA x12, no no wheezing, rales or rhonchi   Abd: non distended, no jaundice, normal bowel sounds, no pain to palpation   Extrem:  No peripheral edema bilaterally   Skin: ecchymosis on right hand, erythema on left forearm after IV removal, otherwise normal texture, color and turgor  Neuro: overall global muscle weakness with wrist extensors and flexors 4/5, right shoulder abductor and adductors 4/5, left wrist and finger flexors and extensors 4/5, bilateral foot and toe flexors and extensors are 4/5 as well. Sensation intact globally. I have reviewed the patient's Labs and Radiology studies. Lab Results   Component Value Date/Time    WBC 4.6 05/08/2017 09:13 AM    Hemoglobin (POC) 17.0 04/26/2017 09:34 PM    HGB 11.3 05/08/2017 09:13 AM    Hematocrit (POC) 50 04/26/2017 09:34 PM    HCT 32.0 05/08/2017 09:13 AM    PLATELET 817 29/54/6206 09:13 AM    MCV 87.0 05/08/2017 09:13 AM     Lab Results   Component Value Date/Time    Sodium 139 05/09/2017 06:25 AM    Potassium 5.0 05/09/2017 06:25 AM    Chloride 109 05/09/2017 06:25 AM    CO2 21 05/09/2017 06:25 AM    Anion gap 9 05/09/2017 06:25 AM    Glucose 93 05/09/2017 06:25 AM    BUN <1 05/09/2017 06:25 AM    Creatinine 0.30 05/09/2017 06:25 AM    BUN/Creatinine ratio Cannot be calulated 05/09/2017 06:25 AM    GFR est AA >60 05/09/2017 06:25 AM    GFR est non-AA >60 05/09/2017 06:25 AM    Calcium 8.1 05/09/2017 06:25 AM    Bilirubin, total 0.9 05/08/2017 09:13 AM    AST (SGOT) 44 05/08/2017 09:13 AM    Alk.  phosphatase 72 05/08/2017 09:13 AM    Protein, total 5.9 05/08/2017 09:13 AM    Albumin 2.4 05/08/2017 09:13 AM    Globulin 3.5 05/08/2017 09:13 AM    A-G Ratio 0.7 05/08/2017 09:13 AM    ALT (SGPT) 82 05/08/2017 09:13 AM       Intake/Output Summary (Last 24 hours) at 05/10/17 1038  Last data filed at 05/10/17 1000   Gross per 24 hour   Intake          2816.67 ml   Output             1800 ml   Net          1016.67 ml       Current Facility-Administered Medications:     dextrose 5% and 0.9% NaCl infusion, 100 mL/hr, IntraVENous, CONTINUOUS, BRINDA Orellana, Last Rate: 100 mL/hr at 05/10/17 0736, 100 mL/hr at 05/10/17 0736    pseudoephedrine (SUDAFED) tablet 30 mg, 30 mg, Oral, Q6H, Nilay Patrick MD, 30 mg at 05/10/17 0737    doxycycline (MONODOX) capsule 100 mg, 100 mg, Oral, Q12H, Robert Pedro MD, 100 mg at 05/10/17 0742    ELECTROLYTE REPLACEMENT PROTOCOL, 1 Each, Other, PRN, Blain Garter, PA Georganna Duverney ELECTROLYTE REPLACEMENT PROTOCOL, 1 Each, Other, PRN, Jerel Ling, PA    ELECTROLYTE REPLACEMENT PROTOCOL, 1 Each, Other, PRN, Jerel Ling, PA    ELECTROLYTE REPLACEMENT PROTOCOL, 1 Each, Other, PRN, Jerel Ling, PA    ELECTROLYTE REPLACEMENT PROTOCOL, 1 Each, Other, PRN, Jerel Ling, PA    ELECTROLYTE REPLACEMENT PROTOCOL, 1 Each, Other, PRN, Jerel Ling, PA    ELECTROLYTE REPLACEMENT PROTOCOL, 1 Each, Other, PRN, Jerel Ling, PA    gabapentin (NEURONTIN) capsule 300 mg, 300 mg, Oral, TID, Trina Archuleta MD, 300 mg at 05/10/17 0740    senna (SENOKOT) tablet 17.2 mg, 2 Tab, Oral, QHS, Collette Lolly, DO, 17.2 mg at 05/09/17 2200    oxyCODONE IR (ROXICODONE) tablet 10 mg, 10 mg, Oral, Q4H PRN, Collette Lolly, DO, 10 mg at 67/34/30 8600    folic acid (FOLVITE) tablet 1 mg, 1 mg, Oral, DAILY, Corinne Soto MD, 1 mg at 05/10/17 0740    thiamine (B-1) 100 mg in 0.9% sodium chloride 50 mL IVPB, 100 mg, IntraVENous, Q24H, Corinne Soto MD, 100 mg at 05/09/17 1509    acetaminophen (TYLENOL) tablet 650 mg, 650 mg, Oral, Q4H PRN, Ruiz Barraza MD    scopolamine (TRANSDERM-SCOP) 1.5 mg, 1.5 mg, TransDERmal, Q72H, Dewitte Lust, DO, 1.5 mg at 05/09/17 1417    QUEtiapine (SEROquel) tablet 50 mg, 50 mg, Oral, QHS, Dewitte Lust, DO, 50 mg at 05/09/17 2106    citalopram (CELEXA) tablet 20 mg, 20 mg, Oral, DAILY, Dewitte Lust, DO, 20 mg at 05/10/17 0740    benzocaine-menthol (CEPACOL) lozenge 1 Lozenge, 1 Lozenge, Mucous Membrane, PRN, Dewitte Lust, DO, 1 Lozenge at 04/28/17 1659    enoxaparin (LOVENOX) injection 40 mg, 40 mg, SubCUTAneous, Q24H, Viktoriya Recio MD, 40 mg at 05/10/17 0523    ondansetron (ZOFRAN) injection 4 mg, 4 mg, IntraVENous, Q4H PRN, Aniyah eDnt MD, 4 mg at 04/30/17 1159    LORazepam (ATIVAN) tablet 1 mg, 1 mg, Oral, Q6H PRN, Tamica Yang DO, 1 mg at 05/09/17 1509    oxymetazoline (AFRIN) 0.05 % nasal spray 2 Spray, 2 Spray, Both Nostrils, BID PRN, Estrella Olson,     sodium chloride (NS) flush 5-10 mL, 5-10 mL, IntraVENous, PRN, Yasemin Perales MD, 10 mL at 05/05/17 0544        Assessment:   Regulo Kameli   Hypokalemia  Dehydration  Leukocytosis   UTI        Plan:   Zaida Garcia  - Neurology following, continue Iv/Ig for all five treatments  - Move to floor (2w)  - Continue to monitor   - Consider rehab or SNF for discharge  - Treatment of acute otitis media with doxy     Hypokalemia  -resolved, continue to monitor   Dehydration  - Continue to encourage fluids  - improved  Leukocytosis   - resolved, continue to monitor   UTI  - improved        Davonte EVANS [address]  5/10/2017  10:38 AM  *ATTENTION:  This note has been created by a medical student for educational purposes only. Please do not refer to the content of this note for clinical decision-making, billing, or other purposes. Please see attending physicians note to obtain clinical information on this patient. *

## 2017-05-10 NOTE — PROGRESS NOTES
1945 assumed care of pt.  2030 assessment complete. 2100 call light within reach. 2200 resting quietly in bed.  2314 BP 85/54 paged . 2330 bolus 250 cc given as ordred. 0200 pt.sleeping. 0430 pt.refused bath & refused to be turned. Bedside and Verbal shift change report given to [address] (oncoming nurse) by Sherly Cordero RN (offgoing nurse). Report included the following information SBAR, Kardex, Intake/Output, MAR and Recent Results.

## 2017-05-10 NOTE — PROGRESS NOTES
Received patient to this unit from 2700 via stretcher accompanied by RN. Patient is alert, oriented X 4. Respiration is even, unlabored. Patient complained of generalized pain 8/10. Med. To be given.  Not in acute distress

## 2017-05-10 NOTE — PROGRESS NOTES
Problem: Self Care Deficits Care Plan (Adult)  Goal: *Acute Goals and Plan of Care (Insert Text)  Occupational Therapy Goals  Initiated 4/29/2017; re-evaluated on 5/8/2017 within 7 day(s). 1. Patient will perform self-feeding with minimal assistance/contact guard assist, adaptive strategies prn.   2. Patient will perform grooming with minimal assistance/contact guard assist.  3. Patient will perform upper body dressing with minimal assistance/contact guard assist.  4. Patient will perform functional task for 3 minutes while seated on EOB with mod assist for balance. 5. Patient will participate in upper extremity therapeutic exercise/activities with minimal assistance/contact guard assist for 8 minutes to increase strength/endurance for ADLs. 6. Patient will perform functional task with minimal assistance utilizing adaptive strategies, prn, to maintain function for ADLs. Outcome: Progressing Towards Goal  OCCUPATIONAL THERAPY TREATMENT     Patient: Yamile Brown (82 y.o. female)  Date: 5/10/2017  Diagnosis: Hypokalemia  UTI (urinary tract infection)  Dehydration  Encephalopathy  Polycythemia (HCC)  Elevated liver enzymes  Lactic acidosis  Leukocytosis Encephalopathy       Precautions: Fall  Chart, occupational therapy assessment, plan of care, and goals were reviewed. ASSESSMENT:  Pt sleeping upon entry, arouses to verbal/tactile stimuli. Pt requires max encouragement for participation 2/2 c/o pain w/BUE movement, although not rated. Pt demonstrates AROM BUE hands and wrist, requires assist w/elbow and shoulder movements. Pt requires hand over hand assist w/simple ADL grooming tasks, washing face and hands. Pt demonstrates ataxic movements w/self-feeding RUE > LUE. Interesting to note pt had RUE preference w/self-feeding without ataxia and now w/LUE self-feeding preference.   EDUCATION UE TherEx and use (B) hands for increase independence w/drink to mouth  Progression toward goals:  [ ] Improving appropriately and progressing toward goals  [X]          Improving slowly and progressing toward goals  [ ]          Not making progress toward goals and plan of care will be adjusted       PLAN:  Patient continues to benefit from skilled intervention to address the above impairments. Continue treatment per established plan of care. Discharge Recommendations:  Skilled Nursing Facility/LTC  Further Equipment Recommendations for Discharge:  Wheelchair and hospital bed       SUBJECTIVE:   Patient stated I can't.       OBJECTIVE DATA SUMMARY:         Cognitive/Behavioral Status:  Neurologic State: Eyes open to stimulus  Orientation Level: Oriented to person, Oriented to place  Cognition: Follows commands  Safety/Judgement: Fall prevention, Awareness of environment  Functional Mobility and Transfers for ADLs:  ADL Intervention:  Feeding  Feeding Assistance: Maximum assistance  Container Management: Total assistance (dependent)  Utensil Management: Contact guard assistance  Food to Mouth: Contact guard assistance  Drink to Mouth: Contact guard assistance     Grooming  Washing Face: Minimum assistance (w/hand over hand assist)  Washing Hands: Moderate assistance (w/hand over hand assist)     Therapeutic Exercises:   AROM BUE hand squeezes, wrist flexion/extension  AROM > AAROM BUE elbow flexion/extension  PROM > AAROM BUE shoulder flexioni/extension, horizontal ab/adduction     Pain:  Pre Treatment:0  Post Treatment:0  Pain Scale 1: Numeric (0 - 10)  Pain Intensity 1: 8  Pain Location 1: Generalized  Pain Description 1: Aching  Pain Intervention(s) 1: Medication (see MAR)     Activity Tolerance:    Fair minus     Please refer to the flowsheet for vital signs taken during this treatment.   After treatment:   [ ]  Patient left in no apparent distress sitting up in chair  [X]  Patient left in no apparent distress in bed  [X]  Call bell left within reach  [ ]  Nursing notified  [ ]  Caregiver present  [ ]  Bed alarm activated     ANGÉLICA Woods  Time Calculation: 33 mins

## 2017-05-10 NOTE — PROGRESS NOTES
[facility]  Hospitalist Division    Daily progress Note    Patient: Zachary Ireland MRN: [id number]  CSN: [id number]    YOB: 1960  Age: 60 y.o. Sex: female    DOA: 4/26/2017 LOS:  LOS: 13 days                    Subjective:     CC: weakness    Pt is more alert and awake today  She wants to resume all her pain meds for chronic back pain       Objective:      Visit Vitals    /79 (BP 1 Location: Left arm, BP Patient Position: At rest)    Pulse (!) 118    Temp 98 °F (36.7 °C)    Resp 15    Ht 5' 4\" (1.626 m)    Wt 68.1 kg (150 lb 2.1 oz)    SpO2 98%    BMI 25.77 kg/m2       Physical Exam:  NC/AT  NO JVD,TMG  S1/S2 RRR  CTAB, NO WHEEZING  NT,ND, NABS  NO EDEMA  MS 3+/5 in LE, 4/5 UE, Dec ROM Rt shoulder         Intake and Output:  Current Shift:  05/10 0701 - 05/10 1900  In: 1126.7 [P.O.:120;  I.V.:1006.7]  Out: 350 [Urine:350]  Last three shifts:  05/08 1901 - 05/10 0700  In: 3832.5 [P.O.:360; I.V.:3472.5]  Out: 9004 [Urine:3525]    Recent Results (from the past 24 hour(s))   MAGNESIUM    Collection Time: 05/09/17 11:10 PM   Result Value Ref Range    Magnesium 2.2 1.6 - 2.6 mg/dL         Current Facility-Administered Medications:     dextrose 5% and 0.9% NaCl infusion, 100 mL/hr, IntraVENous, CONTINUOUS, BRINDA Herman, Last Rate: 100 mL/hr at 05/10/17 0736, 100 mL/hr at 05/10/17 0736    pseudoephedrine (SUDAFED) tablet 30 mg, 30 mg, Oral, Q6H, Nilay Patrick MD, 30 mg at 05/10/17 0737    doxycycline (MONODOX) capsule 100 mg, 100 mg, Oral, Q12H, Jay Hutchinson MD, 100 mg at 05/10/17 0742    ELECTROLYTE REPLACEMENT PROTOCOL, 1 Each, Other, PRN, Reynaldo Woo, PA    ELECTROLYTE REPLACEMENT PROTOCOL, 1 Each, Other, PRN, Reynaldo Woo, PA    ELECTROLYTE REPLACEMENT PROTOCOL, 1 Each, Other, PRN, Reynaldo Woo, PA    ELECTROLYTE REPLACEMENT PROTOCOL, 1 Each, Other, PRN, Reynaldo Woo, PA    ELECTROLYTE REPLACEMENT PROTOCOL, 1 Each, Other, PRN, Toy Jeff, PA    ELECTROLYTE REPLACEMENT PROTOCOL, 1 Each, Other, PRN, Toy Jeff, PA    ELECTROLYTE REPLACEMENT PROTOCOL, 1 Each, Other, PRN, BRINDA Higgins    gabapentin (NEURONTIN) capsule 300 mg, 300 mg, Oral, TID, Nay Graf MD, 300 mg at 05/10/17 0740    senna (SENOKOT) tablet 17.2 mg, 2 Tab, Oral, QHS, Noni Scotty, DO, 17.2 mg at 05/09/17 2200    oxyCODONE IR (ROXICODONE) tablet 10 mg, 10 mg, Oral, Q4H PRN, Noni Scotty, DO, 10 mg at 28/59/12 3399    folic acid (FOLVITE) tablet 1 mg, 1 mg, Oral, DAILY, Roberto Best MD, 1 mg at 05/10/17 0740    thiamine (B-1) 100 mg in 0.9% sodium chloride 50 mL IVPB, 100 mg, IntraVENous, Q24H, Roberto Best MD, 100 mg at 05/09/17 1509    acetaminophen (TYLENOL) tablet 650 mg, 650 mg, Oral, Q4H PRN, Milka Lopez MD    scopolamine (TRANSDERM-SCOP) 1.5 mg, 1.5 mg, TransDERmal, Q72H, Dossie Lei, DO, 1.5 mg at 05/09/17 1417    QUEtiapine (SEROquel) tablet 50 mg, 50 mg, Oral, QHS, Dossie Lei, DO, 50 mg at 05/09/17 2106    citalopram (CELEXA) tablet 20 mg, 20 mg, Oral, DAILY, Dossie Lei, DO, 20 mg at 05/10/17 0740    benzocaine-menthol (CEPACOL) lozenge 1 Lozenge, 1 Lozenge, Mucous Membrane, PRN, Dossie Lei, DO, 1 Lozenge at 04/28/17 1659    enoxaparin (LOVENOX) injection 40 mg, 40 mg, SubCUTAneous, Q24H, Viktoriya Recio MD, 40 mg at 05/10/17 0523    ondansetron (ZOFRAN) injection 4 mg, 4 mg, IntraVENous, Q4H PRN, Tigre Hill MD, 4 mg at 04/30/17 1159    LORazepam (ATIVAN) tablet 1 mg, 1 mg, Oral, Q6H PRN, Rachid Aguilera DO, 1 mg at 05/09/17 1509    oxymetazoline (AFRIN) 0.05 % nasal spray 2 Spray, 2 Spray, Both Nostrils, BID PRN, Rachid Aguilera DO    sodium chloride (NS) flush 5-10 mL, 5-10 mL, IntraVENous, PRN, Zina Cunha MD, 10 mL at 05/05/17 0539    Lab Results Component Value Date/Time    Glucose 93 05/09/2017 06:25 AM    Glucose 79 05/08/2017 09:13 AM    Glucose 128 05/07/2017 03:15 AM    Glucose 69 05/06/2017 04:30 AM    Glucose 83 05/02/2017 05:00 AM        Assessment/Plan     Principal Problem:    Encephalopathy (4/27/2017)    Active Problems:    Dehydration (4/27/2017)      Hypokalemia (4/27/2017)      Lactic acidosis (4/27/2017)      Polycythemia ([address] (4/27/2017)      Leukocytosis (4/27/2017)      UTI (urinary tract infection) (4/27/2017)      Elevated liver enzymes (4/27/2017)      Acute inflammatory polyneuropathy (HCC) (5/5/2017)      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

## 2017-05-10 NOTE — PROGRESS NOTES
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 Increase gabapentin 600mg tid for her painful paresthesias. --If this sedates her we can drop it back down. Needs rehab when available.      She is also a bit anxious and may be elaborating on some of her symptoms.            Interim History: No reported worsening of symptoms. A little improved on my exam today.      Initial Hx: Around 4/16/2017 she stopped eating because she was nauseas and anytime she ate she would vomit. There was no diarrhea. On Sunday 4/23/2017 the patient developed ataxia. That evening she fell and did not have the strength to get back up . She crawled from the bathroom to her bed and her boyfriend found her down and put her into bed. Her boyfriend called EMS but she refused to come to the ER. Finally she came to the ER on 4/26/2017 after being brought by the police. At that time her lower extremity weakness had worsened and she had started to develop weakness of her upper extremities. This apparently progressed during her stay. Discussed with: Dr. Mathesu Linares. Allergies:    Allergies   Allergen Reactions    Aspirin Other (comments)     Stomach cramps    Ibuprofen Not Reported This Time    Levaquin [Levofloxacin] Not Reported This Time           Mobic [Meloxicam] Not Reported This Time    Pcn [Penicillins] Not Reported This Time    Plaquenil [Hydroxychloroquine] Not Reported This Time         Vital Signs:   Visit Vitals    /79 (BP 1 Location: Left arm, BP Patient Position: At rest)    Pulse (!) 118    Temp 98 °F (36.7 °C)    Resp 15    Ht 5' 4\" (1.626 m)    Wt 68.1 kg (150 lb 2.1 oz)    SpO2 98%    BMI 25.77 kg/m2        Neurological examination:   · Appearance: In no acute distress,   ·     · Mental status examination: Alert and oriented X 3. Cranial Nerves:               II: visual fields Full to confrontation   II: pupils Equal, round, reactive to light   III,VII: ptosis none   III,IV,VI: extraocular muscles  Full ROM   V: facial light touch sensation       V,VII: corneal reflex       VII: facial muscle function  Normal smile. Elevates eyebrows today. VIII: hearing      IX: soft palate elevation       X phonation      XI: sternocleidomastoid strength      XII: tongue strength           · Motor exam: Station, gait: deferred  Wiggles ankles and toes bilaterally. Cannot lift legs off bed. Can move hands and flex elbows against gravity. Also can lift arms up off the bed a little but bilaterally but does not have full antigravity strength.    ·     ·          Review of Systems:   Y  N   Constitutional: [] [] recent weight change  [] [] fever  [] [] sleep difficulties   ENT/Mouth:  [] [] hearing loss  [] [x] swallowing problems   Cardiovascular:  [] [] chest pain   [] [] palpitations    Respiratory: [] [] cough   [] [] shortness of breath  [] [] sleep apnea  [] [] intubated   Gastrointestinal: [] [x] abdominal pain  [] [] nausea   Genitourinary: [] [] frequent urination  [] [] incontinence    Musculoskeletal:   [] [] joint pain  [] [] muscle pain   Integument:   [] [] rash/itching   Neurological:  [] [] dizziness/vertigo  [] [x] speech problems  [] [] language difficulty  [] [] sedation  [] [] confusion  [] [] agitation/combativeness  [] [] loss of consciousness  [] [] numbness/tingling sensation  [] [] tremors  [x] [] weakness in limbs  [] [] difficulty with balance  [] [] frequent or recurring headaches  [] [] memory loss   [] [] comatose  [] [] seizures   Psychiatric:   [] [] depression  [] [] hallucinations           PMH:   Past Medical History:   Diagnosis Date    Allergic rhinitis     Angina pectoris (HCC)     Anxiety     Bronchitis     Chronic fatigue syndrome     Chronic pain     COPD (chronic obstructive pulmonary disease) (HCC)     Generalized pain     Headache, tension-type     HTN (hypertension)     Hyperlipidemia     Hypokalemia     Insomnia     Leg swelling     Lyme disease     Nicotine dependence     OA (osteoarthritis)     RA ([facility]heumatoid arthritis) [address] [address]     Vertigo     Vitamin D deficiency       FH:   Family History   Problem Relation Age of Onset    Colon Cancer Other     Cancer Other      lung      SH:   Social History     Social History    Marital status:      Spouse name: N/A    Number of children: N/A    Years of education: N/A     Social History Main Topics    Smoking status: Current Every Day Smoker     Packs/day: 0.50     Years: 30.00    Smokeless tobacco: Never Used    Alcohol use No    Drug use: No    Sexual activity: Not Asked     Other Topics Concern    None     Social History Narrative          Medications:    [x] REVIEWED  Current Facility-Administered Medications   Medication Dose Route Frequency    gabapentin (NEURONTIN) capsule 600 mg  600 mg Oral TID    dextrose 5% and 0.9% NaCl infusion  100 mL/hr IntraVENous CONTINUOUS    pseudoephedrine (SUDAFED) tablet 30 mg  30 mg Oral Q6H    doxycycline (MONODOX) capsule 100 mg  100 mg Oral Q12H    ELECTROLYTE REPLACEMENT PROTOCOL  1 Each Other PRN    ELECTROLYTE REPLACEMENT PROTOCOL  1 Each Other PRN    ELECTROLYTE REPLACEMENT PROTOCOL  1 Each Other PRN    ELECTROLYTE REPLACEMENT PROTOCOL  1 Each Other PRN    ELECTROLYTE REPLACEMENT PROTOCOL  1 Each Other PRN    ELECTROLYTE REPLACEMENT PROTOCOL  1 Each Other PRN    ELECTROLYTE REPLACEMENT PROTOCOL  1 Each Other PRN    senna (SENOKOT) tablet 17.2 mg  2 Tab Oral QHS    oxyCODONE IR (ROXICODONE) tablet 10 mg  10 mg Oral I5E PRN    folic acid (FOLVITE) tablet 1 mg  1 mg Oral DAILY    thiamine (B-1) 100 mg in 0.9% sodium chloride 50 mL IVPB  100 mg IntraVENous Q24H    acetaminophen (TYLENOL) tablet 650 mg  650 mg Oral Q4H PRN    scopolamine (TRANSDERM-SCOP) 1.5 mg  1.5 mg TransDERmal Q72H    QUEtiapine (SEROquel) tablet 50 mg  50 mg Oral QHS    citalopram (CELEXA) tablet 20 mg  20 mg Oral DAILY    benzocaine-menthol (CEPACOL) lozenge 1 Lozenge  1 Lozenge Mucous Membrane PRN    enoxaparin (LOVENOX) injection 40 mg  40 mg SubCUTAneous Q24H    ondansetron (ZOFRAN) injection 4 mg  4 mg IntraVENous Q4H PRN    LORazepam (ATIVAN) tablet 1 mg  1 mg Oral Q6H PRN    oxymetazoline (AFRIN) 0.05 % nasal spray 2 Spray  2 Spray Both Nostrils BID PRN    sodium chloride (NS) flush 5-10 mL  5-10 mL IntraVENous PRN      Data:      [x] REVIEWED  Recent Results (from the past 24 hour(s))   MAGNESIUM    Collection Time: 05/09/17 11:10 PM   Result Value Ref Range    Magnesium 2.2 1.6 - 2.6 mg/dL

## 2017-05-11 PROCEDURE — 74011250636 HC RX REV CODE- 250/636: Performed by: FAMILY MEDICINE

## 2017-05-11 PROCEDURE — 77030020245 HC SOL INJ 5% D/0.9%NACL

## 2017-05-11 PROCEDURE — 74011000258 HC RX REV CODE- 258: Performed by: PHYSICIAN ASSISTANT

## 2017-05-11 PROCEDURE — 65660000000 HC RM CCU STEPDOWN

## 2017-05-11 PROCEDURE — 74011250637 HC RX REV CODE- 250/637: Performed by: PSYCHIATRY & NEUROLOGY

## 2017-05-11 PROCEDURE — 74011250637 HC RX REV CODE- 250/637: Performed by: INTERNAL MEDICINE

## 2017-05-11 PROCEDURE — 74011250637 HC RX REV CODE- 250/637: Performed by: HOSPITALIST

## 2017-05-11 RX ORDER — ASPIRIN 325 MG/1
100 TABLET, FILM COATED ORAL DAILY
Status: DISCONTINUED | OUTPATIENT
Start: 2017-05-11 | End: 2017-05-19 | Stop reason: HOSPADM

## 2017-05-11 RX ADMIN — GABAPENTIN 600 MG: 300 CAPSULE ORAL at 00:02

## 2017-05-11 RX ADMIN — OXYCODONE HYDROCHLORIDE 10 MG: 5 TABLET ORAL at 22:46

## 2017-05-11 RX ADMIN — PSEUDOEPHEDRINE HCL 30 MG: 30 TABLET, FILM COATED ORAL at 13:23

## 2017-05-11 RX ADMIN — FOLIC ACID 1 MG: 1 TABLET ORAL at 08:40

## 2017-05-11 RX ADMIN — DEXTROSE MONOHYDRATE AND SODIUM CHLORIDE 100 ML/HR: 5; .9 INJECTION, SOLUTION INTRAVENOUS at 08:55

## 2017-05-11 RX ADMIN — OXYCODONE HYDROCHLORIDE 10 MG: 5 TABLET ORAL at 13:28

## 2017-05-11 RX ADMIN — DOXYCYCLINE 100 MG: 100 CAPSULE ORAL at 20:31

## 2017-05-11 RX ADMIN — PSEUDOEPHEDRINE HCL 30 MG: 30 TABLET, FILM COATED ORAL at 20:31

## 2017-05-11 RX ADMIN — OXYCODONE HYDROCHLORIDE 10 MG: 5 TABLET ORAL at 08:38

## 2017-05-11 RX ADMIN — GABAPENTIN 600 MG: 300 CAPSULE ORAL at 18:20

## 2017-05-11 RX ADMIN — PSEUDOEPHEDRINE HCL 30 MG: 30 TABLET, FILM COATED ORAL at 08:41

## 2017-05-11 RX ADMIN — GABAPENTIN 600 MG: 300 CAPSULE ORAL at 08:47

## 2017-05-11 RX ADMIN — DEXTROSE MONOHYDRATE AND SODIUM CHLORIDE 100 ML/HR: 5; .9 INJECTION, SOLUTION INTRAVENOUS at 01:00

## 2017-05-11 RX ADMIN — SENNOSIDES 17.2 MG: 8.6 TABLET, FILM COATED ORAL at 22:45

## 2017-05-11 RX ADMIN — DOXYCYCLINE 100 MG: 100 CAPSULE ORAL at 08:40

## 2017-05-11 RX ADMIN — OXYCODONE HYDROCHLORIDE 10 MG: 5 TABLET ORAL at 18:20

## 2017-05-11 RX ADMIN — Medication 100 MG: at 08:41

## 2017-05-11 RX ADMIN — ENOXAPARIN SODIUM 40 MG: 40 INJECTION SUBCUTANEOUS at 05:07

## 2017-05-11 RX ADMIN — QUETIAPINE FUMARATE 50 MG: 25 TABLET, FILM COATED ORAL at 22:45

## 2017-05-11 RX ADMIN — CITALOPRAM HYDROBROMIDE 20 MG: 20 TABLET ORAL at 08:41

## 2017-05-11 NOTE — PROGRESS NOTES
Assumed patient care. Received patient awake, alert, oriented X4. Patient complained of generalized gilberto. Medication to be given. Bed is locked and in lowest position and call bell is within reach. Not in acute distress.

## 2017-05-11 NOTE — PROGRESS NOTES
Assumed care of patient from [facility]. Patient sleeping, no signs of distress. Call bell in reach. Bedside and Verbal shift change report given to Oliver Eckert (oncoming nurse) by April Patel (offgoing nurse). Report included the following information SBAR and Kardex.

## 2017-05-11 NOTE — INTERDISCIPLINARY ROUNDS
IDR Summary      Patient: Huber Hurtado MRN: [id number]    Age: 60 y.o.  : 1960     Admit Diagnosis: Hypokalemia  UTI (urinary tract infection)  Dehydration  Encephalopathy  Polycythemia (HCC)  Elevated liver enzymes  Lactic acidosis  Leukocytosis      Problems pertinent to hospital stay:     Consults:P.T, O.T. and Case Management     Testing due for patient today?  NO    Nutrition plan:Yes     Mobility needs: Yes      Lines/Tubes:   IV: YES  Needed: YES  Haley: YES  Needed:YES  Central Line: NO Needed: NO      VTE Prophylaxis: Chemical          Care Management:  Discharge plan: SNF    PCP: Gaby Gross MD    : NO  Financial concerns:No   Interventions:       LOS: 14 days     Expected days until discharge: TBD       Signed:     AMBROCIO Gonzalez-BC  [id number]  Hospitalist Division  Pager:  [phone]  Office:  [phone]

## 2017-05-11 NOTE — PROGRESS NOTES
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  - suspect precipitating factor was acute viral pharyngitis (7 days PTA) No recent flu-like illness, diarrheal episodes, no recent vaccinations, no recent travel  - currently no evidence of pharyngeal inflammation or any other active infection -> completed IVIG per Neurology  -> plan for Psych eval  -> still no stool cx to check for Campylobacter (last BM 5/6)  -> CMV IgG sig elevated but neg IgM as for Lyme- s/o old disease   Severe Hypokalemia on admission  - due to poor po intake  - corrected - resolved   Abnormal CSF  - elevated protein, normal glucose without leukorrhachia  - c/w GBS - CSF studies not s/o infection    Abnormal LFT's  - has diffuse hepatic steatosis on CT +/- dehydration  - transaminases, alk phos improving - Slightly better   New Otitis media- c/o pain in ears and decreased hearing - On Doxy per IM  - says better with Pseudophed  - Monitor LFTs as can get worse   HTN      COPD     OA/ RA     Hyperlipidemia     ? Chronic Fatigue Syndrome     h/o Lyme Disease  - dx'd ~ 2001 rx'd w/ 2 1-month courses doxycyline 2004 and 2006 and subsequently w/ multiple persistent complaints.  multiple + IgG western blots and negative IgM western blots dating back to 2003 (per Dr. Andrae Darling ID, 2011: no further treatment recommended) - IgG positive and IgM negative- as expected with her h/o Lyme in past      MICROBIOLOGY:   4/26 blcx NG x 2          urcx  NG  MRSA scrn neg  5/5 CSF cx ntd     LINES AND CATHETERS:   piv     MEDICATIONS:     Current Facility-Administered Medications   Medication Dose Route Frequency    Thiamine Mononitrate (B-1) tablet 100 mg  100 mg Oral DAILY    gabapentin (NEURONTIN) capsule 600 mg  600 mg Oral TID    dextrose 5% and 0.9% NaCl infusion  100 mL/hr IntraVENous CONTINUOUS    pseudoephedrine (SUDAFED) tablet 30 mg  30 mg Oral Q6H    doxycycline (MONODOX) capsule 100 mg  100 mg Oral Q12H    senna (SENOKOT) tablet 17.2 mg  2 Tab Oral QHS    oxyCODONE IR (ROXICODONE) tablet 10 mg  10 mg Oral C1Z PRN    folic acid (FOLVITE) tablet 1 mg  1 mg Oral DAILY    acetaminophen (TYLENOL) tablet 650 mg  650 mg Oral Q4H PRN    scopolamine (TRANSDERM-SCOP) 1.5 mg  1.5 mg TransDERmal Q72H    QUEtiapine (SEROquel) tablet 50 mg  50 mg Oral QHS    citalopram (CELEXA) tablet 20 mg  20 mg Oral DAILY    benzocaine-menthol (CEPACOL) lozenge 1 Lozenge  1 Lozenge Mucous Membrane PRN    enoxaparin (LOVENOX) injection 40 mg  40 mg SubCUTAneous Q24H    ondansetron (ZOFRAN) injection 4 mg  4 mg IntraVENous Q4H PRN    LORazepam (ATIVAN) tablet 1 mg  1 mg Oral Q6H PRN    oxymetazoline (AFRIN) 0.05 % nasal spray 2 Spray  2 Spray Both Nostrils BID PRN    sodium chloride (NS) flush 5-10 mL  5-10 mL IntraVENous PRN       SUBJECTIVE :     Interval notes reviewed. Afebrile. On neuro floor. Continue to c/o not feeling any better. C/o jaw pain and ear pain. She says gen pain unchanged and wants to be back on all her pain meds. She continues to feel paralyzed from neck down. OBJECTIVE     Visit Vitals    /86    Pulse (!) 116    Temp 98.6 °F (37 °C)    Resp 18    Ht 5' 4\" (1.626 m)    Wt 68.1 kg (150 lb 2.1 oz)    SpO2 94%    BMI 25.77 kg/m2       Temp (24hrs), Av.4 °F (36.9 °C), Min:98 °F (36.7 °C), Max:98.7 °F (37.1 °C)    Gen-WDWN WF sitting up in bed  HENT- No thrush  Chest- Symmetric expansion, CTA  CV-S1S2 RR, No JVD, No edema  Abd-Soft, NT, ND, BS+  Skin-No jaundice, cyanosis, clubbing. No decubitus  Neuro: awake and oriented, bilateral upper and lower extremity weakness, Rt>left, no NR, .       Labs: Results:   Chemistry Recent Labs      17   0625   GLU  93   NA  139   K  5.0   CL  109*   CO2  21   BUN  <1*   CREA  0.30*   CA 8.1*   AGAP  9   BUCR  Cannot be calulated      CBC w/Diff No results for input(s): WBC, RBC, HGB, HCT, PLT, GRANS, LYMPH, EOS, HGBEXT, HCTEXT, PLTEXT, HGBEXT, HCTEXT, PLTEXT in the last 72 hours. RADIOLOGY :          Imaging    Results from [place] encounter on 04/26/17   XR SPINAL PUNC LUMB DX   Narrative History:[place]    COMPARISON: MRI brain 4/29/2017. Informed consent and utilizing sterile technique and local anesthetic a  diagnostic lumbar puncture was performed under fluoroscopy utilizing a posterior  midline approach at L2-3. A 20-gauge needle was used. 4 tubes of clear CSF  forwarded to laboratory. No complications. Timeout performed at 0908. Fluoro time:  3.7 minutes  Radiation dose: (Reference air kerma): 162.89 mGy    Fluoroscopy was used for guidance to document position of the needle with images  saved under fluoroscopy. Impression IMPRESSION:  1. Diagnostic fluoroscopic lumbar puncture performed without complication. Patient transported back to the brower. Results from [place] encounter on 04/26/17   CT ABD PELV W CONT   Narrative CT OF THE ABDOMEN AND PELVIS, WITH CONTRAST    INDICATION: Upper abdominal pain and abnormal LFTs    COMPARISON: 8/8/2016. TECHNIQUE: Standard helical CT performed through the abdomen and pelvis with IV  contrast.  Coronal and sagittal reformations were generated. One or more dose reduction techniques were used on this CT: automated exposure  control, adjustment of the mAs and/or kVp according to patient's size, and  iterative reconstruction techniques. The specific techniques utilized on this CT  exam have been documented in the patient's electronic medical record.    ============    FINDINGS:    INFERIOR THORAX: Moderate right and mild left dependent atelectasis. No acute  pulmonary infiltrate. Mild global cardiomegaly. No pericardial effusion. LIVER/BILIARY: Diffuse hepatic steatosis.  No suspicious liver lesion. No biliary  dilation. Punctate gallstones with no secondary findings of cholecystitis. SPLEEN: Normal.    PANCREAS: Normal.    ADRENALS: Normal.    KIDNEYS: Exophytic right upper pole renal cyst. No acute or suspicious renal  abnormality. Alysa Pretty LYMPH NODES: No mesenteric or retroperitoneal lymphadenopathy. GI TRACT: No wall thickening or dilation. Small hiatal hernia. Normal appendix. Scattered colonic diverticulosis, with no findings of an acute diverticulitis. VASCULATURE: Normal caliber distal thoracic and abdominal aorta. PELVIC ORGANS: Haley catheter is present within the nondistended bladder. Heterogeneously enhancing endometrial complex. BONES: No acute osseous abnormality. Degenerative changes of the thoracic spine. OTHER: No ascites or free intraperitoneal air.    ============         Impression IMPRESSION:    No acute or focal abnormality to explain patient's abdominal pain. There is  diffuse hepatic steatosis. Cholelithiasis. Colonic diverticulosis with no  findings of an acute diverticulitis. Note: Preliminary report sent to the Emergency Department by the radiology  resident at the time of the study.           Maria Antonia Young MD  May 11, 2017    [facility] AT THE [facility]  [phone]

## 2017-05-11 NOTE — PROGRESS NOTES
2000-Pt received AAOX4. IV infiltrated during changed of shift. Ice pack applied. Pt refused to have another IV put in until the AM. Assisted patient with dinner. Ate 25% of meal. Haley draining Louise urine. 2300- Pt agreed to having new IV placed to L FA. IV fluids restarted. 9392- pt resting in bed with eyes closed. Appears comfortable. 0300- pt resting with eyes closed. Appears comfortable.

## 2017-05-11 NOTE — MED STUDENT NOTES
*ATTENTION:  This note has been created by a medical student for educational purposes only. Please do not refer to the content of this note for clinical decision-making, billing, or other purposes. Please see attending physicians note to obtain clinical information on this patient. *       Student Progress Note  Please refer to attendings daily rounding note for full details      Patient: Jay Merlos MRN: [id number]  CSN: [id number]    YOB: 1960  Age: 60 y.o. Sex: female    DOA: 4/26/2017 LOS:  LOS: 14 days          Chief Complaint:  weakness    Subjective:      Pt states she is not doing well because she still cannot move but other wise is doing well. Also states that she continues to have the \"pins and needle pain\" in her hands and is hoping that the increased dose of gabapentin helps. Objective:      Visit Vitals    /86    Pulse (!) 116    Temp 98.6 °F (37 °C)    Resp 18    Ht 5' 4\" (1.626 m)    Wt 68.1 kg (150 lb 2.1 oz)    SpO2 94%    BMI 25.77 kg/m2         Physical Exam:  Gen: alert and oriented x4, NAD  HEENT: nontraumatic, normocephalic, trachea midline   CV: regular rhythms, periods of tachycardia, no murmurs, rubs or gallops, normal s1 and s2  Resp: CTA x12, no wheezing, rales or rhonchi   Abd: non distended, soft, normal bowel sounds, no organomegaly, no pain to palpation  Extrem: no peripheral edema in lower extremities bilaterally    Skin: normal texture, color and tugor except for right forearm where IV was infiltrated last night   Neuro: global muscle weakness, able to move hands, abduct left arm and move toes and feet at the ankle, pin and needle pain in the hands bilaterally, normal sensation to light touch in all four extremities       I have reviewed the patient's Labs and Radiology studies.   Lab Results   Component Value Date/Time    WBC 4.6 05/08/2017 09:13 AM    Hemoglobin (POC) 17.0 04/26/2017 09:34 PM    HGB 11.3 05/08/2017 09:13 AM    Hematocrit (POC) 50 04/26/2017 09:34 PM    HCT 32.0 05/08/2017 09:13 AM    PLATELET 874 08/14/5582 09:13 AM    MCV 87.0 05/08/2017 09:13 AM     Lab Results   Component Value Date/Time    Sodium 139 05/09/2017 06:25 AM    Potassium 5.0 05/09/2017 06:25 AM    Chloride 109 05/09/2017 06:25 AM    CO2 21 05/09/2017 06:25 AM    Anion gap 9 05/09/2017 06:25 AM    Glucose 93 05/09/2017 06:25 AM    BUN <1 05/09/2017 06:25 AM    Creatinine 0.30 05/09/2017 06:25 AM    BUN/Creatinine ratio Cannot be calulated 05/09/2017 06:25 AM    GFR est AA >60 05/09/2017 06:25 AM    GFR est non-AA >60 05/09/2017 06:25 AM    Calcium 8.1 05/09/2017 06:25 AM    Bilirubin, total 0.9 05/08/2017 09:13 AM    AST (SGOT) 44 05/08/2017 09:13 AM    Alk.  phosphatase 72 05/08/2017 09:13 AM    Protein, total 5.9 05/08/2017 09:13 AM    Albumin 2.4 05/08/2017 09:13 AM    Globulin 3.5 05/08/2017 09:13 AM    A-G Ratio 0.7 05/08/2017 09:13 AM    ALT (SGPT) 82 05/08/2017 09:13 AM       Intake/Output Summary (Last 24 hours) at 05/11/17 1037  Last data filed at 05/11/17 0710   Gross per 24 hour   Intake              240 ml   Output             1155 ml   Net             -915 ml       Current Facility-Administered Medications:     Thiamine Mononitrate (B-1) tablet 100 mg, 100 mg, Oral, DAILY, Kristi Crystal MD, 100 mg at 05/11/17 0841    gabapentin (NEURONTIN) capsule 600 mg, 600 mg, Oral, TID, Kristi Crystal MD, 600 mg at 05/11/17 0847    dextrose 5% and 0.9% NaCl infusion, 100 mL/hr, IntraVENous, CONTINUOUS, BRINDA Coronel, Last Rate: 100 mL/hr at 05/11/17 0855, 100 mL/hr at 05/11/17 0855    pseudoephedrine (SUDAFED) tablet 30 mg, 30 mg, Oral, Q6H, Nilay Patrick MD, 30 mg at 05/11/17 0841    doxycycline (MONODOX) capsule 100 mg, 100 mg, Oral, Q12H, Asya Avalos MD, 100 mg at 05/11/17 0840    senna (SENOKOT) tablet 17.2 mg, 2 Tab, Oral, QHS, Mireya Do DO, 17.2 mg at 05/10/17 2116    oxyCODONE IR (ROXICODONE) tablet 10 mg, 10 mg, Oral, Q4H PRN, Jovani Senior DO, 10 mg at 89/44/07 4237    folic acid (FOLVITE) tablet 1 mg, 1 mg, Oral, DAILY, Bulmaro Martin MD, 1 mg at 05/11/17 0840    acetaminophen (TYLENOL) tablet 650 mg, 650 mg, Oral, Q4H PRN, Ariela Duque MD    scopolamine (TRANSDERM-SCOP) 1.5 mg, 1.5 mg, TransDERmal, Q72H, Watson Escobar, DO, 1.5 mg at 05/09/17 1417    QUEtiapine (SEROquel) tablet 50 mg, 50 mg, Oral, QHS, Watson Escobar DO, 50 mg at 05/10/17 2116    citalopram (CELEXA) tablet 20 mg, 20 mg, Oral, DAILY, Watson Escobar DO, 20 mg at 05/11/17 0841    benzocaine-menthol (CEPACOL) lozenge 1 Lozenge, 1 Lozenge, Mucous Membrane, PRN, Watson Escobar DO, 1 Lozenge at 04/28/17 1659    enoxaparin (LOVENOX) injection 40 mg, 40 mg, SubCUTAneous, Q24H, Viktoriya Recio MD, 40 mg at 05/11/17 0507    ondansetron (ZOFRAN) injection 4 mg, 4 mg, IntraVENous, Q4H PRN, Pat Rocha MD, 4 mg at 04/30/17 1159    LORazepam (ATIVAN) tablet 1 mg, 1 mg, Oral, Q6H PRN, Watson Escobar DO, 1 mg at 05/09/17 1509    oxymetazoline (AFRIN) 0.05 % nasal spray 2 Spray, 2 Spray, Both Nostrils, BID PRN, Watson Escobar DO    sodium chloride (NS) flush 5-10 mL, 5-10 mL, IntraVENous, PRN, Fidelina Walker MD, 10 mL at 05/05/17 0544        Assessment:   Guillain [place]  Anxiety  UTI  Dehydration  Elevated Liver Enzymes   Hypokalemia  Lactic Acidosis        Plan:   Natan Bear  -neurology following  -has received five doses of Iv Ig  -needs discharge to rehab  Anxiety  -possibly causing her to over exaggerate on some of her symptoms  - causing tachycardia  -has prn medication for anxiety, but not taking   UTI  -resolved  Dehydration  -IV infiltrated last night and pt refused another until this morning but she cannot drink on her own due to inability to reach for and hold onto cup  -new IV placed this morning and fluids continued, should be resolved this afternoon -monitor for sign of continuing dehydration  Elevated Liver Enzymes   Hypokalemia  -resolved  -continue to monitor   Lactic Acidosis  -resolved  Acute otitis media  -placed on lisset EVANS [address]  5/11/2017  10:37 AM  *ATTENTION:  This note has been created by a medical student for educational purposes only. Please do not refer to the content of this note for clinical decision-making, billing, or other purposes. Please see attending physicians note to obtain clinical information on this patient. *

## 2017-05-11 NOTE — PROGRESS NOTES
OT attempted 2x, pt refusing this am despite max enocuragement and this afternoon w/telepsych. Will f/u 5/12/17.     Reyes Faith, COTA/YOCASTA

## 2017-05-11 NOTE — PROGRESS NOTES
[facility]  ICU FU  2017    Events:   transferred out of ICU yesterday. She looks a lot better to me. C/o painful RUE IV infiltration and persistent right ear pain and an odd mouth sensation. 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 * probably chronic sinusitis   * says she has deviated septum and gets frequent nasal congestion and dyspnea   * doxycycline was started for this as well    * has used claritin in past -> restart   * adamantly refuses to use nasal saline or any other intranasal rx    # Heme/ Coag:   * VTEP enox 40    #GI/ Nutrition     # Others   * GI-elevated LFT likely fatty liver-improved   * Anxiety/Depression/Chronic pain -psych consulted-celexa, seroquel, ativan prn, gabapentin   * Renal-AICHA, resolved   * reason for scopolamine? # SUMMARY/ DISPOSITION:   * her chronic pain and psych issues may make evaluation difficult. She seems overall a lot better to me and in no danger of respiratory failure   * I will continue to see every other day or so/ PRN   * decrease FVC to daily   * Please call for any Q or concerns    -ashley  [phone]               HPI:   61 yo female with PMH anxiety, chronic fatigue, COPD, HTN, HL admitted with progressive weakness and poor PO intake thought to have possible axonal GBS receiving IVIG.     Vital Signs:    Visit Vitals    BP (!) 121/92    Pulse (!) 112    Temp 98.6 °F (37 °C)    Resp 18    Ht 5' 4\" (1.626 m)    Wt 68.1 kg (150 lb 2.1 oz)    SpO2 94%    BMI 25.77 kg/m2       O2 Device: Room air       Temp (24hrs), Av.7 °F (37.1 °C), Min:98.6 °F (37 °C), Max:98.7 °F (37.1 °C) Intake/Output:   Last shift:      05/11 0701 - 05/11 1900  In: -   Out: 1380 [Urine:1380]  Last 3 shifts: 05/09 1901 - 05/11 0700  In: 1966.7 [P.O.:360; I.V.:1606.7]  Out: 1700 [Urine:1700]    Intake/Output Summary (Last 24 hours) at 05/11/17 1419  Last data filed at 05/11/17 1150   Gross per 24 hour   Intake              240 ml   Output             1955 ml   Net            -1715 ml       Physical Exam:    General/Neuro:   Awake and alert. Seemed in no distress. ? converstaional dyspnea. Says she feel terrible no better. Moves both feet more vigorously to my exam; still unable to lift legs to gravity and resists passive movement - \"hurts all over\"   Moves BUE. No discernable CN defects  PERRL&A   HEENT:  Anicteric sclerae; poor dentition   * dificult oral exam large tongue and resists the tongue depressor   * right nasal congestion   * tender \"everywhere\" but maybe has legitimate sinus tenderness right max   * rt tragus hurts but again the EAC looks fine  Resp:  Symmetrical chest expansion, no accessory muscle use; good airway entry; no rales/ wheezing/ rhonchi; breath sounds are normal  CV:  Regular, no murmur  : abraham yellow  GI:  Abdomen soft, non-tender; (+) active bowel sounds; mod obese  Extremities:  Symmetric soft +2 pulses; no CCE   * signif cellulitis/ phlebitis RUE dorsal due to PIV infiltration   * no joint inflammation  Skin:  Right dorsal thigh: brown discoloration - poor exam as pt does not like to be moved around ? Stain/dermatitis? She has not noticed it before.        DATA:     Current Facility-Administered Medications   Medication Dose Route Frequency    Thiamine Mononitrate (B-1) tablet 100 mg  100 mg Oral DAILY    gabapentin (NEURONTIN) capsule 600 mg  600 mg Oral TID    dextrose 5% and 0.9% NaCl infusion  100 mL/hr IntraVENous CONTINUOUS    pseudoephedrine (SUDAFED) tablet 30 mg  30 mg Oral Q6H    doxycycline (MONODOX) capsule 100 mg  100 mg Oral Q12H    senna (SENOKOT) tablet 17.2 mg  2 Tab Oral QHS    oxyCODONE IR (ROXICODONE) tablet 10 mg  10 mg Oral W8S PRN    folic acid (FOLVITE) tablet 1 mg  1 mg Oral DAILY    acetaminophen (TYLENOL) tablet 650 mg  650 mg Oral Q4H PRN    scopolamine (TRANSDERM-SCOP) 1.5 mg  1.5 mg TransDERmal Q72H    QUEtiapine (SEROquel) tablet 50 mg  50 mg Oral QHS    citalopram (CELEXA) tablet 20 mg  20 mg Oral DAILY    benzocaine-menthol (CEPACOL) lozenge 1 Lozenge  1 Lozenge Mucous Membrane PRN    enoxaparin (LOVENOX) injection 40 mg  40 mg SubCUTAneous Q24H    ondansetron (ZOFRAN) injection 4 mg  4 mg IntraVENous Q4H PRN    LORazepam (ATIVAN) tablet 1 mg  1 mg Oral Q6H PRN    oxymetazoline (AFRIN) 0.05 % nasal spray 2 Spray  2 Spray Both Nostrils BID PRN    sodium chloride (NS) flush 5-10 mL  5-10 mL IntraVENous PRN         Labs: Results:       Chemistry Recent Labs      05/09/17   0625   GLU  93   NA  139   K  5.0   CL  109*   CO2  21   BUN  <1*   CREA  0.30*   CA  8.1*   AGAP  9   BUCR  Cannot be calulated          CT Head    4/26/2017  1. Subtle left-to-right shift of midline structures and asymmetric left  extra-axial fluid density with suggestion of possible small arachnoid cyst along  the anterior margin of the left sylvian fissure. Probable asymmetric left  frontal and left temporal lobe atrophy otherwise. MRI could better delineate  suspected extra-axial lesion. No findings to suggest acute infarct. No acute  intracranial hemorrhage. 2. Mild cerebral atrophy/volume loss and periventricular white matter changes,  most commonly seen with chronic microvascular disease      CT OF THE ABDOMEN AND PELVIS, WITH CONTRAST  4/27/2017 0226 AM  INDICATION: Upper abdominal pain and abnormal LFTs   COMPARISON: 8/8/2016. FINDINGS:  INFERIOR THORAX: Moderate right and mild left dependent atelectasis. No acute  pulmonary infiltrate. Mild global cardiomegaly. No pericardial effusion.  LIVER/BILIARY: Diffuse hepatic steatosis. No suspicious liver lesion. No biliary  dilation. Punctate gallstones with no secondary findings of cholecystitis.   SPLEEN: Normal.   PANCREAS: Normal.   ADRENALS: Normal.   KIDNEYS: Exophytic right upper pole renal cyst. No acute or suspicious renal  abnormality. .   LYMPH NODES: No mesenteric or retroperitoneal lymphadenopathy.   GI TRACT: No wall thickening or dilation. Small hiatal hernia. Normal appendix. Scattered colonic diverticulosis, with no findings of an acute diverticulitis.   VASCULATURE: Normal caliber distal thoracic and abdominal aorta.   PELVIC ORGANS: Haley catheter is present within the nondistended bladder. Heterogeneously enhancing endometrial complex.   BONES: No acute osseous abnormality. Degenerative changes of the thoracic spine.   OTHER: No ascites or free intraperitoneal air.   IMPRESSION:   No acute or focal abnormality to explain patient's abdominal pain. There is  diffuse hepatic steatosis. Cholelithiasis. Colonic diverticulosis with no  findings of an acute diverticulitis.   Note: Preliminary report sent to the Emergency Department by the radiology  resident at the time of the study      MRI 4/29/17  1. Atrophy and mild to moderate chronic small vessel changes with asymmetric  volume loss in the left cerebral hemisphere which is of indeterminate etiology  and significance.     2. Otherwise, unremarkable evaluation. Specifically, no acute intracranial  abnormalities are identified. 5/6/17 MRI Lumbar spine  1. Negative for imaging evidence of Cristina Fabian syndrome and other acute  cauda equina processes. Widely patent central spinal canal.     2.  Right greater than left posterior paraspinal muscle edema -- a common  finding that often reflects either chronic denervation edema or acute/subacute  muscle strain.     3. Straightened lumbar lordosis with minor lumbar scoliosis.     4. Potential for right L5 nerve root impingement in the extraforaminal zone.          Nilay Patrick MD, [facility]

## 2017-05-11 NOTE — PROGRESS NOTES
[facility]  Hospitalist Division    Daily progress Note    Patient: Jay Merlos MRN: [id number]  CSN: [id number]    YOB: 1960  Age: 60 y.o. Sex: female    DOA: 4/26/2017 LOS:  LOS: 14 days                    Subjective:     CC: weakness    Pt is alert and awake, NAD        Objective:      Visit Vitals    BP (!) 121/92    Pulse (!) 112    Temp 98.6 °F (37 °C)    Resp 18    Ht 5' 4\" (1.626 m)    Wt 68.1 kg (150 lb 2.1 oz)    SpO2 94%    BMI 25.77 kg/m2       Physical Exam:  NC/AT  NO JVD,TMG  S1/S2 RRR  CTAB, NO WHEEZING  NT,ND, NABS  NO EDEMA  MS 3+/5 in LE, 4/5 UE, Dec ROM Rt shoulder         Intake and Output:  Current Shift:  05/11 0701 - 05/11 1900  In: -   Out: 1380 [Urine:1380]  Last three shifts:  05/09 1901 - 05/11 0700  In: 1966.7 [P.O.:360; I.V.:1606.7]  Out: 1700 [Urine:1700]    No results found for this or any previous visit (from the past 24 hour(s)).       Current Facility-Administered Medications:     Thiamine Mononitrate (B-1) tablet 100 mg, 100 mg, Oral, DAILY, Trina Archuleta MD, 100 mg at 05/11/17 0841    gabapentin (NEURONTIN) capsule 600 mg, 600 mg, Oral, TID, Trina Archuleta MD, 600 mg at 05/11/17 0847    dextrose 5% and 0.9% NaCl infusion, 100 mL/hr, IntraVENous, CONTINUOUS, BRINDA Oh, Last Rate: 100 mL/hr at 05/11/17 0855, 100 mL/hr at 05/11/17 0855    pseudoephedrine (SUDAFED) tablet 30 mg, 30 mg, Oral, Q6H, Nilay Patrick MD, 30 mg at 05/11/17 1323    doxycycline (MONODOX) capsule 100 mg, 100 mg, Oral, Q12H, Zac Reyes MD, 100 mg at 05/11/17 0840    senna (SENOKOT) tablet 17.2 mg, 2 Tab, Oral, QHS, Collette Galli, DO, 17.2 mg at 05/10/17 2116    oxyCODONE IR (ROXICODONE) tablet 10 mg, 10 mg, Oral, Q4H PRN, Collette Galli, DO, 10 mg at 14/47/60 3648    folic acid (FOLVITE) tablet 1 mg, 1 mg, Oral, DAILY, Corinne Soto MD, 1 mg at 05/11/17 0840    acetaminophen (TYLENOL) tablet 650 mg, 650 mg, Oral, Q4H PRN, Ruiz Barraza MD    scopolamine (TRANSDERM-SCOP) 1.5 mg, 1.5 mg, TransDERmal, Q72H, Dewitte Lust, DO, 1.5 mg at 05/09/17 1417    QUEtiapine (SEROquel) tablet 50 mg, 50 mg, Oral, QHS, Dewitte Lust, DO, 50 mg at 05/10/17 2116    citalopram (CELEXA) tablet 20 mg, 20 mg, Oral, DAILY, Dewitte Lust, DO, 20 mg at 05/11/17 0841    benzocaine-menthol (CEPACOL) lozenge 1 Lozenge, 1 Lozenge, Mucous Membrane, PRN, Dewitte Lust, DO, 1 Lozenge at 04/28/17 1659    enoxaparin (LOVENOX) injection 40 mg, 40 mg, SubCUTAneous, Q24H, Viktoriya Recio MD, 40 mg at 05/11/17 0507    ondansetron (ZOFRAN) injection 4 mg, 4 mg, IntraVENous, Q4H PRN, Aniyah Dent MD, 4 mg at 04/30/17 1159    LORazepam (ATIVAN) tablet 1 mg, 1 mg, Oral, Q6H PRN, Dewitte Lust, DO, 1 mg at 05/09/17 1509    oxymetazoline (AFRIN) 0.05 % nasal spray 2 Spray, 2 Spray, Both Nostrils, BID PRN, Dewitte Lust, DO    sodium chloride (NS) flush 5-10 mL, 5-10 mL, IntraVENous, PRN, Jose Christy MD, 10 mL at 05/05/17 0544    Lab Results   Component Value Date/Time    Glucose 93 05/09/2017 06:25 AM    Glucose 79 05/08/2017 09:13 AM    Glucose 128 05/07/2017 03:15 AM    Glucose 69 05/06/2017 04:30 AM    Glucose 83 05/02/2017 05:00 AM        Assessment/Plan     Principal Problem:    Encephalopathy (4/27/2017)    Active Problems:    Dehydration (4/27/2017)      Hypokalemia (4/27/2017)      Lactic acidosis (4/27/2017)      Polycythemia ([facility] [address] (4/27/2017)      Leukocytosis (4/27/2017)      UTI (urinary tract infection) (4/27/2017)      Elevated liver enzymes (4/27/2017)      Acute inflammatory polyneuropathy (HCC) (5/5/2017)      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

## 2017-05-11 NOTE — CONSULTS
Reason for consult: Diagnostic update  History of Present Illness: 61 y/o female with history of depression, admitted on 4/26 brought in by police for failure to thrive, AMS. Had not been eating or drinking, was incontinent and had been bed-ridden for quite some time. Had not been taking medical or psychiatric medications for extended period of time. She was found to have UTI, hypokalemia initially. Pt had reported trouble swallowing, feeling like saliva had sand in it. Then developed numbness/weakness and hearing concerns. Question of conversion disorder, somatic delusions, or depression with psychosis. She reported depressed mood secondary to physical symptoms at time of prior telepsych eval.  Pt was evaluated by neurology, found to have polyneuropathy, treated for Gu[place] and has completed course of IVIg. Pt Discharge plan is for nursing home placement. Today, pt reports that her mood is \"fine\". Denies depression or anxiety. Denies SI or HI. Has been sleeping well with seroquel. However, she is still very concerned about her level of pain, numbness, as well as feeling like something is in her mouth obstructing swallowing. Also feeling back spasms. States that symptoms have not improved, perhaps are worse than before. She is still hopeful but not sure why it is not improving. She is eating what she can but states that it is \"hard to even grab a spoon\". States that her boyfriend visits every day, and she enjoys that time. SI/HI/Self harm: Suicide attempt in September 2016. Violence: Denies  Legal: Denies  Collateral: EMR  Psychiatric History/Treatment History: One prior hospital admission in 2016 after SA. Was prescribed, Remeron, Klonopin, Celexa, Seroquel. Drug/Alcohol History: \"I never drunk alcohol and I never took drugs, except what the doctor prescribe\".   UDS negative, BAL <3.  Medical History:   Past Medical History:   Diagnosis Date    Allergic rhinitis     Angina pectoris ([facility] [address]     Anxiety     Bronchitis     Chronic fatigue syndrome     Chronic pain     COPD (chronic obstructive pulmonary disease) (HCC)     Generalized pain     Headache, tension-type     HTN (hypertension)     Hyperlipidemia     Hypokalemia     Insomnia     Leg swelling     Lyme disease     Nicotine dependence     OA (osteoarthritis)     RA (rheumatoid arthritis) ([facility] [address]     Vertigo     Vitamin D deficiency      Patient Active Problem List   Diagnosis Code    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    Pain in joint, multiple sites M25.50    Cervicalgia M54.2    Chronic low back pain M54.5, G89.29    Migraine without aura G43.009    Myalgia and myositis NHM0276    Encounter for long-term (current) use of high-risk medication Z79.899    Enthesopathy of hip region M76.899    Tobacco abuse Z72.0    Chronic headache disorder R51    Drug-induced constipation K59.03    Muscle spasms of neck M62.838    RLS (restless legs syndrome) G25.81    Depression F32.9    Dehydration E86.0    Hypokalemia E87.6    Lactic acidosis E87.2    Polycythemia (HCC) D75.1    Leukocytosis D72.829    UTI (urinary tract infection) N39.0    Encephalopathy G93.40    Elevated liver enzymes R74.8    Acute inflammatory polyneuropathy (HCC) G61.0    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 (HCC) G61.0     Medications & Freq:     Current Facility-Administered Medications:     Thiamine Mononitrate (B-1) tablet 100 mg, 100 mg, Oral, DAILY, Jane Tenorio MD, 100 mg at 05/11/17 0841    gabapentin (NEURONTIN) capsule 600 mg, 600 mg, Oral, TID, Jane Tenorio MD, 600 mg at 05/11/17 0847    dextrose 5% and 0.9% NaCl infusion, 100 mL/hr, IntraVENous, CONTINUOUS, BRINDA Mancini, Last Rate: 100 mL/hr at 05/11/17 0855, 100 mL/hr at 05/11/17 0855    pseudoephedrine (SUDAFED) tablet 30 mg, 30 mg, Oral, Q6H, Nilay Patrick MD, 30 mg at 05/11/17 1323    doxycycline (MONODOX) capsule 100 mg, 100 mg, Oral, Q12H, Lisandro Alba MD Irma, 100 mg at 05/11/17 0840    senna (SENOKOT) tablet 17.2 mg, 2 Tab, Oral, QHS, Marine New, DO, 17.2 mg at 05/10/17 2116    oxyCODONE IR (ROXICODONE) tablet 10 mg, 10 mg, Oral, Q4H PRN, Greg New, DO, 10 mg at 15/32/47 6973    folic acid (FOLVITE) tablet 1 mg, 1 mg, Oral, DAILY, Roverto Smith MD, 1 mg at 05/11/17 0840    acetaminophen (TYLENOL) tablet 650 mg, 650 mg, Oral, Q4H PRN, Casandra Velasquez MD    scopolamine (TRANSDERM-SCOP) 1.5 mg, 1.5 mg, TransDERmal, Q72H, Finis Edge, DO, 1.5 mg at 05/09/17 1417    QUEtiapine (SEROquel) tablet 50 mg, 50 mg, Oral, QHS, Finis Edge, DO, 50 mg at 05/10/17 2116    citalopram (CELEXA) tablet 20 mg, 20 mg, Oral, DAILY, Finis Edge, DO, 20 mg at 05/11/17 0841    benzocaine-menthol (CEPACOL) lozenge 1 Lozenge, 1 Lozenge, Mucous Membrane, PRN, Finis Edge, DO, 1 Lozenge at 04/28/17 1659    enoxaparin (LOVENOX) injection 40 mg, 40 mg, SubCUTAneous, Q24H, Viktoriya Recio MD, 40 mg at 05/11/17 0507    ondansetron (ZOFRAN) injection 4 mg, 4 mg, IntraVENous, Q4H PRN, Radha Hernández MD, 4 mg at 04/30/17 1159    LORazepam (ATIVAN) tablet 1 mg, 1 mg, Oral, Q6H PRN, Finis Edge, DO, 1 mg at 05/09/17 1509    oxymetazoline (AFRIN) 0.05 % nasal spray 2 Spray, 2 Spray, Both Nostrils, BID PRN, Finis Edge, DO    sodium chloride (NS) flush 5-10 mL, 5-10 mL, IntraVENous, PRN, Pat Urena MD, 10 mL at 05/05/17 3997     Allergies: Allergies   Allergen Reactions    Aspirin Other (comments)     Stomach cramps    Ibuprofen Not Reported This Time    Levaquin [Levofloxacin] Not Reported This Time           Mobic [Meloxicam] Not Reported This Time    Pcn [Penicillins] Not Reported This Time    Plaquenil [Hydroxychloroquine] Not Reported This Time     Family Psych History/History of suicide: Denies  Social History: Lives with boyfriend.   Pt is from [place], moved to [address] after high school. Employment: Unemployed, on disability for neck/back pain since 2000. Education: Graduated high school. Stressors: Medical  Mental Status Exam:   Appearance and attire: Disheveled, wearing hospital gown, sitting in bed. Attitude and behavior: Pleasant, calm, cooperative. Using left arm/hand when speaking; not moving right side. Speech: Has accent, normal rate/volume/tone. Mood: Appears euthymic at this time. Affect: Reactive, mood-congruent. Association and thought processes: Goal-directed. Thought content: Denies SI/HI. Pt very focused on physical symptoms, comes back to this topic after almost every question asked. Perception: Not responding to internal stimuli. No clear evidence of delusions. Sensorium and orientation: Alert, oriented x 3. Memory and intellectual functioning: Grossly intact. Insight and judgment: Fair. Impression/Risk Assessment: 61 y/o female with history of depression, admitted for FTT and AMS. Multiple medical issues found and treated, but pt continues to have unrelenting neurological symptoms. Unable to definitively diagnose coversion or other somatic disorder, as these are diagnoses of exclusion. Depression has been chronic, but currently under fair control. Diagnosis: Unspecified depressive disorder (F32.9); Consider conversion disorder. Treatment Recommendations:  1. Continue Celexa and Seroquel at current doses - mood has improved, and pt is sleeping well and tolerating meds well. 2.  Pt does not meet criteria for inpatient psychiatric admission at this time. The above recommendations were discussed with pt who expressed understanding. Referring provider could not be reached. Please contact access center for further clarification.

## 2017-05-11 NOTE — PROGRESS NOTES
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 Increase gabapentin 600mg tid for her painful paresthesias. Needs rehab when available. Switched her to PO thiamine supplementation. Already on folate supplementation orally.    heavy metals negative.    She is also a bit anxious and may be elaborating on some of her symptoms.     Needs rehab.      Interim History: No reported worsening of symptoms. A little improved on my exam today.       Initial Hx: Around 4/16/2017 she stopped eating because she was nauseas and anytime she ate she would vomit. There was no diarrhea. On Sunday 4/23/2017 the patient developed ataxia. That evening she fell and did not have the strength to get back up . She crawled from the bathroom to her bed and her boyfriend found her down and put her into bed. Her boyfriend called EMS but she refused to come to the ER. Finally she came to the ER on 4/26/2017 after being brought by the police. At that time her lower extremity weakness had worsened and she had started to develop weakness of her upper extremities. This apparently progressed during her stay.        Allergies:    Allergies   Allergen Reactions    Aspirin Other (comments)     Stomach cramps    Ibuprofen Not Reported This Time    Levaquin [Levofloxacin] Not Reported This Time           Mobic [Meloxicam] Not Reported This Time    Pcn [Penicillins] Not Reported This Time    Plaquenil [Hydroxychloroquine] Not Reported This Time         Vital Signs:   Visit Vitals    BP (!) 146/105 (BP 1 Location: Left arm, BP Patient Position: At rest)    Pulse (!) 126    Temp 99.1 °F (37.3 °C)    Resp 16    Ht 5' 4\" (1.626 m)    Wt 68.1 kg (150 lb 2.1 oz)    SpO2 98%    BMI 25.77 kg/m2        Neurological examination:   · Appearance: In no acute distress,   ·     · Mental status examination: Alert and oriented X 3. Cranial Nerves:               II: visual fields Full to confrontation   II: pupils Equal, round, reactive to light   III,VII: ptosis none   III,IV,VI: extraocular muscles  Full ROM   V: facial light touch sensation       V,VII: corneal reflex       VII: facial muscle function  Normal smile. Elevates eyebrows today. VIII: hearing      IX: soft palate elevation       X phonation      XI: sternocleidomastoid strength      XII: tongue strength           · Motor exam: Station, gait: deferred  Wiggles ankles and toes bilaterally. Cannot lift legs off bed. Can move hands and flex elbows against gravity. Also can lift arms up off the bed a little but bilaterally but does not have full antigravity strength.     ·     ·        Review of Systems:   Y  N   Constitutional: [] [] recent weight change  [] [x] fever  [] [] sleep difficulties   ENT/Mouth:  [] [] hearing loss  [] [x] swallowing problems   Cardiovascular:  [] [] chest pain   [] [] palpitations    Respiratory: [] [x] cough   [] [] shortness of breath  [] [] sleep apnea  [] [] intubated   Gastrointestinal: [] [] abdominal pain  [] [x] nausea   Genitourinary: [] [] frequent urination  [] [] incontinence    Musculoskeletal:   [] [] joint pain  [] [x] muscle pain   Integument:   [] [] rash/itching   Neurological:  [] [] dizziness/vertigo  [] [x] speech problems  [] [] language difficulty  [] [] sedation  [] [] confusion  [] [x] agitation/combativeness  [] [] loss of consciousness  [] [] numbness/tingling sensation  [] [] tremors  [] [] weakness in limbs  [] [] difficulty with balance  [] [] frequent or recurring headaches  [] [] memory loss   [] [] comatose  [] [] seizures   Psychiatric:   [] [] depression  [] [] hallucinations           PMH:   Past Medical History:   Diagnosis Date    Allergic rhinitis     Angina pectoris (HCC)     Anxiety     Bronchitis     Chronic fatigue syndrome     Chronic pain     COPD (chronic obstructive pulmonary disease) (HCC)     Generalized pain     Headache, tension-type     HTN (hypertension)     Hyperlipidemia     Hypokalemia     Insomnia     Leg swelling     Lyme disease     Nicotine dependence     OA (osteoarthritis)     RA [facility]umatoid arthritis) ([facility] [address]     Vertigo     Vitamin D deficiency       FH:   Family History   Problem Relation Age of Onset    Colon Cancer Other     Cancer Other      lung      SH:   Social History     Social History    Marital status:      Spouse name: N/A    Number of children: N/A    Years of education: N/A     Social History Main Topics    Smoking status: Current Every Day Smoker     Packs/day: 0.50     Years: 30.00    Smokeless tobacco: Never Used    Alcohol use No    Drug use: No    Sexual activity: Not Asked     Other Topics Concern    None     Social History Narrative          Medications:    [] REVIEWED  Current Facility-Administered Medications   Medication Dose Route Frequency    Thiamine Mononitrate (B-1) tablet 100 mg  100 mg Oral DAILY    gabapentin (NEURONTIN) capsule 600 mg  600 mg Oral TID    pseudoephedrine (SUDAFED) tablet 30 mg  30 mg Oral Q6H    doxycycline (MONODOX) capsule 100 mg  100 mg Oral Q12H    senna (SENOKOT) tablet 17.2 mg  2 Tab Oral QHS    oxyCODONE IR (ROXICODONE) tablet 10 mg  10 mg Oral M3M PRN    folic acid (FOLVITE) tablet 1 mg  1 mg Oral DAILY    acetaminophen (TYLENOL) tablet 650 mg  650 mg Oral Q4H PRN    scopolamine (TRANSDERM-SCOP) 1.5 mg  1.5 mg TransDERmal Q72H    QUEtiapine (SEROquel) tablet 50 mg  50 mg Oral QHS    citalopram (CELEXA) tablet 20 mg  20 mg Oral DAILY    benzocaine-menthol (CEPACOL) lozenge 1 Lozenge  1 Lozenge Mucous Membrane PRN    enoxaparin (LOVENOX) injection 40 mg  40 mg SubCUTAneous Q24H    ondansetron (ZOFRAN) injection 4 mg  4 mg IntraVENous Q4H PRN    LORazepam (ATIVAN) tablet 1 mg  1 mg Oral Q6H PRN    oxymetazoline (AFRIN) 0.05 % nasal spray 2 Spray  2 Spray Both Nostrils BID PRN    sodium chloride (NS) flush 5-10 mL  5-10 mL IntraVENous PRN      Data:      [x] REVIEWEDNo results found for this or any previous visit (from the past 24 hour(s)).

## 2017-05-12 LAB
ANION GAP BLD CALC-SCNC: 11 MMOL/L (ref 3–18)
BASOPHILS # BLD AUTO: 0.1 K/UL (ref 0–0.06)
BASOPHILS # BLD: 1 % (ref 0–2)
BUN SERPL-MCNC: 4 MG/DL (ref 7–18)
BUN/CREAT SERPL: 10 (ref 12–20)
CALCIUM SERPL-MCNC: 8.7 MG/DL (ref 8.5–10.1)
CHLORIDE SERPL-SCNC: 107 MMOL/L (ref 100–108)
CO2 SERPL-SCNC: 23 MMOL/L (ref 21–32)
CREAT SERPL-MCNC: 0.4 MG/DL (ref 0.6–1.3)
DIFFERENTIAL METHOD BLD: ABNORMAL
EOSINOPHIL # BLD: 0.1 K/UL (ref 0–0.4)
EOSINOPHIL NFR BLD: 1 % (ref 0–5)
ERYTHROCYTE [DISTWIDTH] IN BLOOD BY AUTOMATED COUNT: 17.9 % (ref 11.6–14.5)
GLUCOSE SERPL-MCNC: 90 MG/DL (ref 74–99)
HCT VFR BLD AUTO: 30.3 % (ref 35–45)
HGB BLD-MCNC: 10.3 G/DL (ref 12–16)
LYMPHOCYTES # BLD AUTO: 53 % (ref 21–52)
LYMPHOCYTES # BLD: 3.2 K/UL (ref 0.9–3.6)
MCH RBC QN AUTO: 30.7 PG (ref 24–34)
MCHC RBC AUTO-ENTMCNC: 34 G/DL (ref 31–37)
MCV RBC AUTO: 90.4 FL (ref 74–97)
MONOCYTES # BLD: 0.7 K/UL (ref 0.05–1.2)
MONOCYTES NFR BLD AUTO: 12 % (ref 3–10)
NEUTS SEG # BLD: 2 K/UL (ref 1.8–8)
NEUTS SEG NFR BLD AUTO: 33 % (ref 40–73)
PLATELET # BLD AUTO: 382 K/UL (ref 135–420)
PMV BLD AUTO: 9.8 FL (ref 9.2–11.8)
POTASSIUM SERPL-SCNC: 3.4 MMOL/L (ref 3.5–5.5)
RBC # BLD AUTO: 3.35 M/UL (ref 4.2–5.3)
RBC MORPH BLD: ABNORMAL
SODIUM SERPL-SCNC: 141 MMOL/L (ref 136–145)
WBC # BLD AUTO: 6.1 K/UL (ref 4.6–13.2)
WBC MORPH BLD: ABNORMAL

## 2017-05-12 PROCEDURE — 74011250637 HC RX REV CODE- 250/637: Performed by: INTERNAL MEDICINE

## 2017-05-12 PROCEDURE — 74011250636 HC RX REV CODE- 250/636: Performed by: FAMILY MEDICINE

## 2017-05-12 PROCEDURE — 65660000000 HC RM CCU STEPDOWN

## 2017-05-12 PROCEDURE — 77030032490 HC SLV COMPR SCD KNE COVD -B

## 2017-05-12 PROCEDURE — 36415 COLL VENOUS BLD VENIPUNCTURE: CPT | Performed by: INTERNAL MEDICINE

## 2017-05-12 PROCEDURE — 74011250637 HC RX REV CODE- 250/637: Performed by: PSYCHIATRY & NEUROLOGY

## 2017-05-12 PROCEDURE — 85025 COMPLETE CBC W/AUTO DIFF WBC: CPT | Performed by: INTERNAL MEDICINE

## 2017-05-12 PROCEDURE — 80048 BASIC METABOLIC PNL TOTAL CA: CPT | Performed by: INTERNAL MEDICINE

## 2017-05-12 PROCEDURE — 97110 THERAPEUTIC EXERCISES: CPT

## 2017-05-12 PROCEDURE — 74011250637 HC RX REV CODE- 250/637: Performed by: HOSPITALIST

## 2017-05-12 RX ORDER — LORATADINE 10 MG/1
10 TABLET ORAL DAILY
Status: DISCONTINUED | OUTPATIENT
Start: 2017-05-12 | End: 2017-05-19 | Stop reason: HOSPADM

## 2017-05-12 RX ADMIN — PSEUDOEPHEDRINE HCL 30 MG: 30 TABLET, FILM COATED ORAL at 18:45

## 2017-05-12 RX ADMIN — LORATADINE 10 MG: 10 TABLET ORAL at 11:11

## 2017-05-12 RX ADMIN — ENOXAPARIN SODIUM 40 MG: 40 INJECTION SUBCUTANEOUS at 04:17

## 2017-05-12 RX ADMIN — DOXYCYCLINE 100 MG: 100 CAPSULE ORAL at 09:15

## 2017-05-12 RX ADMIN — GABAPENTIN 600 MG: 300 CAPSULE ORAL at 04:14

## 2017-05-12 RX ADMIN — DOXYCYCLINE 100 MG: 100 CAPSULE ORAL at 22:12

## 2017-05-12 RX ADMIN — GABAPENTIN 600 MG: 300 CAPSULE ORAL at 22:10

## 2017-05-12 RX ADMIN — GABAPENTIN 600 MG: 300 CAPSULE ORAL at 11:11

## 2017-05-12 RX ADMIN — OXYCODONE HYDROCHLORIDE 10 MG: 5 TABLET ORAL at 09:15

## 2017-05-12 RX ADMIN — PSEUDOEPHEDRINE HCL 30 MG: 30 TABLET, FILM COATED ORAL at 09:15

## 2017-05-12 RX ADMIN — OXYCODONE HYDROCHLORIDE 10 MG: 5 TABLET ORAL at 13:47

## 2017-05-12 RX ADMIN — OXYCODONE HYDROCHLORIDE 10 MG: 5 TABLET ORAL at 19:05

## 2017-05-12 RX ADMIN — QUETIAPINE FUMARATE 50 MG: 25 TABLET, FILM COATED ORAL at 22:12

## 2017-05-12 RX ADMIN — FOLIC ACID 1 MG: 1 TABLET ORAL at 09:15

## 2017-05-12 RX ADMIN — PSEUDOEPHEDRINE HCL 30 MG: 30 TABLET, FILM COATED ORAL at 22:11

## 2017-05-12 RX ADMIN — Medication 100 MG: at 09:15

## 2017-05-12 RX ADMIN — PSEUDOEPHEDRINE HCL 30 MG: 30 TABLET, FILM COATED ORAL at 04:10

## 2017-05-12 RX ADMIN — CITALOPRAM HYDROBROMIDE 20 MG: 20 TABLET ORAL at 09:15

## 2017-05-12 NOTE — ROUTINE PROCESS
Bedside and Verbal shift change report given to Shyann Love (oncoming nurse) by neil alford rn (offgoing nurse). Report given with SBAR, Kardex, Intake/Output and Recent Results.

## 2017-05-12 NOTE — PROGRESS NOTES
[facility]  Hospitalist Division    Daily progress Note    Patient: Nery Desai MRN: [id number]  CSN: [id number]    YOB: 1960  Age: 60 y.o. Sex: female    DOA: 4/26/2017 LOS:  LOS: 15 days                    Subjective:     CC: weakness    Pt is alert and awake, NAD  C/o sinus allergies         Objective:      Visit Vitals    /72 (BP 1 Location: Left arm, BP Patient Position: At rest)    Pulse (!) 118    Temp 98.4 °F (36.9 °C)    Resp 18    Ht 5' 4\" (1.626 m)    Wt 68.1 kg (150 lb 2.1 oz)    SpO2 96%    BMI 25.77 kg/m2       Physical Exam:  NC/AT  NO JVD,TMG  S1/S2 RRR  CTAB, NO WHEEZING  NT,ND, NABS  NO EDEMA  MS 3+/5 in LE, 4/5 UE, Dec ROM Rt shoulder         Intake and Output:  Current Shift:     Last three shifts:  05/10 1901 - 05/12 0700  In: 240 [P.O.:240]  Out: 2755 [Urine:2755]    Recent Results (from the past 24 hour(s))   METABOLIC PANEL, BASIC    Collection Time: 05/12/17  4:50 AM   Result Value Ref Range    Sodium 141 136 - 145 mmol/L    Potassium 3.4 (L) 3.5 - 5.5 mmol/L    Chloride 107 100 - 108 mmol/L    CO2 23 21 - 32 mmol/L    Anion gap 11 3.0 - 18 mmol/L    Glucose 90 74 - 99 mg/dL    BUN 4 (L) 7.0 - 18 MG/DL    Creatinine 0.40 (L) 0.6 - 1.3 MG/DL    BUN/Creatinine ratio 10 (L) 12 - 20      GFR est AA >60 >60 ml/min/1.73m2    GFR est non-AA >60 >60 ml/min/1.73m2    Calcium 8.7 8.5 - 10.1 MG/DL   CBC WITH AUTOMATED DIFF    Collection Time: 05/12/17  4:50 AM   Result Value Ref Range    WBC 6.1 4.6 - 13.2 K/uL    RBC 3.35 (L) 4.20 - 5.30 M/uL    HGB 10.3 (L) 12.0 - 16.0 g/dL    HCT 30.3 (L) 35.0 - 45.0 %    MCV 90.4 74.0 - 97.0 FL    MCH 30.7 24.0 - 34.0 PG    MCHC 34.0 31.0 - 37.0 g/dL    RDW 17.9 (H) 11.6 - 14.5 %    PLATELET 245 070 - 555 K/uL    MPV 9.8 9.2 - 11.8 FL    NEUTROPHILS 33 (L) 40 - 73 %    LYMPHOCYTES 53 (H) 21 - 52 %    MONOCYTES 12 (H) 3 - 10 %    EOSINOPHILS 1 0 - 5 %    BASOPHILS 1 0 - 2 %    ABS.  NEUTROPHILS 2.0 1.8 - 8.0 K/UL    ABS. LYMPHOCYTES 3.2 0.9 - 3.6 K/UL    ABS. MONOCYTES 0.7 0.05 - 1.2 K/UL    ABS. EOSINOPHILS 0.1 0.0 - 0.4 K/UL    ABS. BASOPHILS 0.1 (H) 0.0 - 0.06 K/UL    RBC COMMENTS ANISOCYTOSIS  1+        WBC COMMENTS FEW SMUDGE CELLS PRESENT.      DF AUTOMATED           Current Facility-Administered Medications:     Thiamine Mononitrate (B-1) tablet 100 mg, 100 mg, Oral, DAILY, Blessing Pierson MD, 100 mg at 05/12/17 0915    gabapentin (NEURONTIN) capsule 600 mg, 600 mg, Oral, TID, Blessing Pierson MD, 600 mg at 05/12/17 0414    pseudoephedrine (SUDAFED) tablet 30 mg, 30 mg, Oral, Q6H, Nilay Patrick MD, 30 mg at 05/12/17 0915    doxycycline (MONODOX) capsule 100 mg, 100 mg, Oral, Q12H, Hunter Monson MD, 100 mg at 05/12/17 0915    senna (SENOKOT) tablet 17.2 mg, 2 Tab, Oral, QHS, Richard Holtb, DO, 17.2 mg at 05/11/17 2245    oxyCODONE IR (ROXICODONE) tablet 10 mg, 10 mg, Oral, Q4H PRN, Karli Stinson, DO, 10 mg at 27/39/03 4753    folic acid (FOLVITE) tablet 1 mg, 1 mg, Oral, DAILY, Fernando Tarango MD, 1 mg at 05/12/17 0915    acetaminophen (TYLENOL) tablet 650 mg, 650 mg, Oral, Q4H PRN, Francisco Moncada MD    scopolamine (TRANSDERM-SCOP) 1.5 mg, 1.5 mg, TransDERmal, Q72H, Estrella Olson, DO, 1.5 mg at 05/09/17 1417    QUEtiapine (SEROquel) tablet 50 mg, 50 mg, Oral, QHS, Estrella Olson, DO, 50 mg at 05/11/17 2245    citalopram (CELEXA) tablet 20 mg, 20 mg, Oral, DAILY, Estrella Du, DO, 20 mg at 05/12/17 0915    benzocaine-menthol (CEPACOL) lozenge 1 Lozenge, 1 Lozenge, Mucous Membrane, PRN, Estrella Olson, DO, 1 Lozenge at 04/28/17 1659    enoxaparin (LOVENOX) injection 40 mg, 40 mg, SubCUTAneous, Q24H, Viktoriya Recio MD, 40 mg at 05/12/17 0417    ondansetron (ZOFRAN) injection 4 mg, 4 mg, IntraVENous, Q4H PRN, Viktoriya Recio MD, 4 mg at 04/30/17 1159    LORazepam (ATIVAN) tablet 1 mg, 1 mg, Oral, Q6H PRN, Governor Ward Reece York DO, 1 mg at 05/09/17 1509    oxymetazoline (AFRIN) 0.05 % nasal spray 2 Spray, 2 Spray, Both Nostrils, BID PRN, Danay Whitmore DO    sodium chloride (NS) flush 5-10 mL, 5-10 mL, IntraVENous, PRN, Ean Monique MD, 10 mL at 05/05/17 0544    Lab Results   Component Value Date/Time    Glucose 90 05/12/2017 04:50 AM    Glucose 93 05/09/2017 06:25 AM    Glucose 79 05/08/2017 09:13 AM    Glucose 128 05/07/2017 03:15 AM    Glucose 69 05/06/2017 04:30 AM        Assessment/Plan     Principal Problem:    Encephalopathy (4/27/2017)    Active Problems:    Dehydration (4/27/2017)      Hypokalemia (4/27/2017)      Lactic acidosis (4/27/2017)      Polycythemia ([address] (4/27/2017)      Leukocytosis (4/27/2017)      UTI (urinary tract infection) (4/27/2017)      Elevated liver enzymes (4/27/2017)      Acute inflammatory polyneuropathy (HCC) (5/5/2017)      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

## 2017-05-12 NOTE — ROUTINE PROCESS
Bedside and Verbal shift change report given to BASIL Stout (oshncoming nurse) by Yamileth Ceja RN (offgoing nurse). Report included the folowing information SBAR, Kardex, Intake/Output, MAR and Recent Results.

## 2017-05-12 NOTE — MED STUDENT NOTES
*ATTENTION:  This note has been created by a medical student for educational purposes only. Please do not refer to the content of this note for clinical decision-making, billing, or other purposes. Please see attending physicians note to obtain clinical information on this patient. *       Student Progress Note  Please refer to attendings daily rounding note for full details      Patient: Asya Aleman MRN: [id number]  CSN: [id number]    YOB: 1960  Age: 60 y.o. Sex: female    DOA: 4/26/2017 LOS:  LOS: 15 days          Chief Complaint:  weakness    Subjective:    pt still states that she is not doing well because she cannot move her body, otherwise NAD and resting in bed      Objective:      Visit Vitals    /72 (BP 1 Location: Left arm, BP Patient Position: At rest)    Pulse (!) 118    Temp 98.4 °F (36.9 °C)    Resp 18    Ht 5' 4\" (1.626 m)    Wt 68.1 kg (150 lb 2.1 oz)    SpO2 96%    BMI 25.77 kg/m2         Physical Exam:  Gen: alert and oriented x4, NAD  HEENT: nontraumatic, normocephalic, trachea midline, missing numerous teeth/ poor dentition  CV: RRR x4, no murmurs, rubs or gallops  Resp: CTA x12, no wheezing, rales or rhonchi  Abd: non distended, soft, normal bowel sounds, no pain to palpation, no organomegaly   Extrem:  No peripheral edema bilaterally   Skin: normal color, texture and turgor   Neuro: grossly normal       I have reviewed the patient's Labs and Radiology studies.   Lab Results   Component Value Date/Time    WBC 6.1 05/12/2017 04:50 AM    Hemoglobin (POC) 17.0 04/26/2017 09:34 PM    HGB 10.3 05/12/2017 04:50 AM    Hematocrit (POC) 50 04/26/2017 09:34 PM    HCT 30.3 05/12/2017 04:50 AM    PLATELET 101 12/21/2283 04:50 AM    MCV 90.4 05/12/2017 04:50 AM     Lab Results   Component Value Date/Time    Sodium 141 05/12/2017 04:50 AM    Potassium 3.4 05/12/2017 04:50 AM    Chloride 107 05/12/2017 04:50 AM    CO2 23 05/12/2017 04:50 AM    Anion gap 11 05/12/2017 04:50 AM    Glucose 90 05/12/2017 04:50 AM    BUN 4 05/12/2017 04:50 AM    Creatinine 0.40 05/12/2017 04:50 AM    BUN/Creatinine ratio 10 05/12/2017 04:50 AM    GFR est AA >60 05/12/2017 04:50 AM    GFR est non-AA >60 05/12/2017 04:50 AM    Calcium 8.7 05/12/2017 04:50 AM    Bilirubin, total 0.9 05/08/2017 09:13 AM    AST (SGOT) 44 05/08/2017 09:13 AM    Alk.  phosphatase 72 05/08/2017 09:13 AM    Protein, total 5.9 05/08/2017 09:13 AM    Albumin 2.4 05/08/2017 09:13 AM    Globulin 3.5 05/08/2017 09:13 AM    A-G Ratio 0.7 05/08/2017 09:13 AM    ALT (SGPT) 82 05/08/2017 09:13 AM       Intake/Output Summary (Last 24 hours) at 05/12/17 0940  Last data filed at 05/11/17 1901   Gross per 24 hour   Intake                0 ml   Output             1500 ml   Net            -1500 ml       Current Facility-Administered Medications:     Thiamine Mononitrate (B-1) tablet 100 mg, 100 mg, Oral, DAILY, Juan Pablo Yañez MD, 100 mg at 05/12/17 0915    gabapentin (NEURONTIN) capsule 600 mg, 600 mg, Oral, TID, Juan Pablo Yañez MD, 600 mg at 05/12/17 0414    pseudoephedrine (SUDAFED) tablet 30 mg, 30 mg, Oral, Q6H, Nilay Patrick MD, 30 mg at 05/12/17 0915    doxycycline (MONODOX) capsule 100 mg, 100 mg, Oral, Q12H, Fidel Garcias MD, 100 mg at 05/12/17 0915    senna (SENOKOT) tablet 17.2 mg, 2 Tab, Oral, QHS, Richard Soria DO, 17.2 mg at 05/11/17 2245    oxyCODONE IR (ROXICODONE) tablet 10 mg, 10 mg, Oral, Q4H PRN, Nevin Brown DO, 10 mg at 13/01/07 7945    folic acid (FOLVITE) tablet 1 mg, 1 mg, Oral, DAILY, Zhao Bauer MD, 1 mg at 05/12/17 0915    acetaminophen (TYLENOL) tablet 650 mg, 650 mg, Oral, Q4H PRN, Kasie Leonardo MD    scopolamine (TRANSDERM-SCOP) 1.5 mg, 1.5 mg, TransDERmal, Q72H, Ashtyn Castillo, , 1.5 mg at 05/09/17 1417    QUEtiapine (SEROquel) tablet 50 mg, 50 mg, Oral, QHS, Ashtynedson Benzt, DO, 50 mg at 05/11/17 2245    citalopram (CELEXA) tablet 20 mg, 20 mg, Oral, DAILY, Estrella Olson DO, 20 mg at 05/12/17 0915    benzocaine-menthol (CEPACOL) lozenge 1 Lozenge, 1 Lozenge, Mucous Membrane, PRN, Estrella Olson DO, 1 Lozenge at 04/28/17 1659    enoxaparin (LOVENOX) injection 40 mg, 40 mg, SubCUTAneous, Q24H, Viktoriya Recio MD, 40 mg at 05/12/17 0417    ondansetron (ZOFRAN) injection 4 mg, 4 mg, IntraVENous, Q4H PRN, Debbie Chairez MD, 4 mg at 04/30/17 1159    LORazepam (ATIVAN) tablet 1 mg, 1 mg, Oral, Q6H PRN, Estrella Olson DO, 1 mg at 05/09/17 1509    oxymetazoline (AFRIN) 0.05 % nasal spray 2 Spray, 2 Spray, Both Nostrils, BID PRN, Estrella Olson DO    sodium chloride (NS) flush 5-10 mL, 5-10 mL, IntraVENous, PRN, Yasemin Perales MD, 10 mL at 05/05/17 0544        Assessment:     Tiburcio Fraser   Depression  Hypokalemia  Dehydration  Lactic Acidosis  UTI  Acute otitis media      Plan:   Tiburcio Fraser   -neurology following  -received all 5 doses of IvIg  -D/c to rehab when one available  Depression  -psych consult had  -keep all current psych meds as ordered  Hypokalemia  -slightly low potassium today  -prescribe potassium chloride  Dehydration  -resolved with IV fluids  -may need help to maintain fluids since pt has problems bringing cup to face due to limited mobility from the guillian barre  -monitor  Lactic Acidosis  -resolved   -monitor  UTI  -resolved  Acute otitis media  -doxy prescribed, continue taking until course is done      Davonte EVANS [address]  5/12/2017  9:39 AM  *ATTENTION:  This note has been created by a medical student for educational purposes only. Please do not refer to the content of this note for clinical decision-making, billing, or other purposes. Please see attending physicians note to obtain clinical information on this patient. *

## 2017-05-12 NOTE — PROGRESS NOTES
conducted a Follow up consultation and Spiritual Assessment for Shandra Holden, who is a 60 y.o.,female. The  provided the following Interventions:  Continued the relationship of care and support. Listened empathically. Offered prayer and assurance of continued prayer on patients behalf. Chart reviewed. The following outcomes were achieved:  Patient expressed gratitude for pastoral care visit. Assessment:  There are no further spiritual or Congregational issues which require Spiritual Care Services interventions at this time. Plan:  Chaplains will continue to follow and will provide pastoral care on an as needed/requested basis.  recommends bedside caregivers page  on duty if patient shows signs of acute spiritual or emotional distress.      [address] [facility]   Staff [address] [place] [address]   [phone]

## 2017-05-12 NOTE — PROGRESS NOTES
Nutrition follow up/  Plan of care      RECOMMENDATIONS:     1. Dental Soft diet; thin liquids per SLP  2. Monitor weight, labs and PO intake  3. RD to follow     GOALS:     1. Ongoing: PO intake meets >75% of protein/calorie needs by 5/17  2. Ongoing: Weight Maintenance (+/- 1-2 lb by 5/17)       ASSESSMENT:     Weight status is classified as overweight per BMI of 25.9. PO intake is poor. Nutrient deficit related to Axonal Guillain- [place] syndrome as evidenced by inadequate intake at meals this admission. New weight n/a on record. Labs noted. Patient with hypokalemia and hypoalbuminemia. Nutrition recommendations listed. RD to follow. Nutrition Risk:  [] High  [x] Moderate []  Low    SUBJECTIVE/OBJECTIVE:      Transferred from ICU. Reviewed SLP note with recommendations for dental soft diet and thin liquids. Patient with poor appetite. Patient reports waiting to eat lunch meal until  arrives. Reports poor intake of meals for past few days due to not feeling well. Patient was previously receiving Ensure supplements but asked for them to be discontinued. Psych is following. Will monitor. Information Obtained from:    [x] Chart Review   [x] Patient   [] Family/Caregiver   [] Nurse/Physician   [] Interdisciplinary Meeting/Rounds    Diet:Dental Soft diet  Medications: [x] Reviewed    Allergies: [x] Reviewed   Encounter Diagnoses     ICD-10-CM ICD-9-CM   1. Acute hypokalemia E87.6 276.8   2. Lactic acidosis E87.2 276.2   3. Dehydration E86.0 276.51   4.  Dysphagia, unspecified type R13.10 787.20     Past Medical History:   Diagnosis Date    Allergic rhinitis     Angina pectoris (HCC)     Anxiety     Bronchitis     Chronic fatigue syndrome     Chronic pain     COPD (chronic obstructive pulmonary disease) (HCC)     Generalized pain     Headache, tension-type     HTN (hypertension)     Hyperlipidemia     Hypokalemia     Insomnia     Leg swelling     Lyme disease     Nicotine dependence     OA (osteoarthritis)     RA (rheumatoid arthritis) (HCC)     Vertigo     Vitamin D deficiency       Labs:    Lab Results   Component Value Date/Time    Sodium 141 05/12/2017 04:50 AM    Potassium 3.4 05/12/2017 04:50 AM    Chloride 107 05/12/2017 04:50 AM    CO2 23 05/12/2017 04:50 AM    Anion gap 11 05/12/2017 04:50 AM    Glucose 90 05/12/2017 04:50 AM    BUN 4 05/12/2017 04:50 AM    Creatinine 0.40 05/12/2017 04:50 AM    Calcium 8.7 05/12/2017 04:50 AM    Magnesium 2.2 05/09/2017 11:10 PM    Phosphorus 3.3 05/08/2017 09:13 AM    Albumin 2.4 05/08/2017 09:13 AM     Anthropometrics: BMI (calculated): 25.8  Last 3 Recorded Weights in this Encounter    05/04/17 1921 05/06/17 0330 05/06/17 0600   Weight: 68.5 kg (151 lb) 68.1 kg (150 lb 2.1 oz) 68.1 kg (150 lb 2.1 oz)      Ht Readings from Last 1 Encounters:   05/06/17 5' 4\" (1.626 m)     Patient Vitals for the past 100 hrs:   % Diet Eaten   05/10/17 2104 25 %   05/09/17 1200 25 %     IBW: 120 lb %IBW: 126%    Nutrition Needs:   Calories: 8707-3699 Kcal   Protein:   70-82 g      [x] No Cultural, Advent or ethnic dietary need identified.     [] Cultural, Advent and ethnic food preferences identified and addressed     Wt Status:  [] Normal (18.6 - 24.9) [] Underweight (<18.5)   [x] Overweight (25 - 29.9) [] Mild Obesity (30 - 34.9)  [] Moderate Obesity (35 - 39.9) [] Morbid Obesity (40+)     Nutrition Problems Identified:   [x] Suboptimal PO intake   [] Food Allergies  [x] Difficulty chewing/swallowing/poor dentition  [] Constipation/Diarrhea   [] Nausea/Vomiting   [] None  [] Other:     Plan:   [] Therapeutic Diet  []  Obtained/adjusted food preferences/tolerances and/or snacks options   []  Supplements (Ensure BID)   [x] Occupational therapy following for feeding techniques  []  HS snack added   [x]  Modify diet texture   []  Modify diet for food allergies   []  Assist with menu selection   [x]  Monitor PO intake on meal rounds   [x]  Continue inpatient monitoring and intervention   []  Participated in discharge planning/Interdisciplinary rounds/Team meetings   []  Other:     Education Needs:   [x] Not appropriate for teaching at this time    [] Identified and addressed    Nutrition Monitoring and Evaluation:  [x] Continue ongoing monitoring and intervention  [] Mansoor Martins

## 2017-05-12 NOTE — PROGRESS NOTES
Problem: Self Care Deficits Care Plan (Adult)  Goal: *Acute Goals and Plan of Care (Insert Text)  Occupational Therapy Goals  Initiated 4/29/2017; re-evaluated on 5/8/2017 within 7 day(s). 1. Patient will perform self-feeding with minimal assistance/contact guard assist, adaptive strategies prn.   2. Patient will perform grooming with minimal assistance/contact guard assist.  3. Patient will perform upper body dressing with minimal assistance/contact guard assist.  4. Patient will perform functional task for 3 minutes while seated on EOB with mod assist for balance. 5. Patient will participate in upper extremity therapeutic exercise/activities with minimal assistance/contact guard assist for 8 minutes to increase strength/endurance for ADLs. 6. Patient will perform functional task with minimal assistance utilizing adaptive strategies, prn, to maintain function for ADLs. Outcome: Progressing Towards Goal  OCCUPATIONAL THERAPY TREATMENT     Patient: Brandi Melendez (7 y.o. female)  Date: 5/12/2017  Diagnosis: Hypokalemia  UTI (urinary tract infection)  Dehydration  Encephalopathy  Polycythemia (HCC)  Elevated liver enzymes  Lactic acidosis  Leukocytosis Encephalopathy       Precautions: Fall  Chart, occupational therapy assessment, plan of care, and goals were reviewed. ASSESSMENT:  Pt cont with c/o pain, \"pins and needles\", refusing EOB activity and bed mobility rolling L/R despite max encouragement and education of benefits. Pt c/o inability to move BUEs, yet performs AROM BUE elbow/wrist flexion/extension. Pt requires assist w/ROM BUE shoulder flexion, horizontal ab/adduction. BUE elevated at end of session.   EDUCATION Pt educated on importance UE TherEx  Progression toward goals:  [ ]          Improving appropriately and progressing toward goals  [X]          Improving slowly and progressing toward goals  [ ]          Not making progress toward goals and plan of care will be adjusted PLAN:  Patient continues to benefit from skilled intervention to address the above impairments. Continue treatment per established plan of care. Discharge Recommendations:  Skilled Nursing Facility  Further Equipment Recommendations for Discharge:  hospital bed and wheelchair        SUBJECTIVE:   Patient stated I've got candy, but I can't reach it.       OBJECTIVE DATA SUMMARY:         Cognitive/Behavioral Status:  Neurologic State: Alert  Orientation Level: Oriented X4  Cognition: Appropriate for age attention/concentration, Follows commands  Safety/Judgement: Fall prevention, Awareness of environment     Therapeutic Exercises:   AAROM > AROM BUE shoulder flexion/extension, horizontal ab/adduction  AROM BUE elbow/wrist flexion/extension     Pain:  Pre Treatment:10  Post Treatment:10  Pain Scale 1: Numeric (0 - 10)  Pain Intensity 1: 10  Pain Location 1: Arm;Hand;Back (\"pins and needles\")  Pain Description 1: Aching  Pain Intervention(s) 1: Elevation; Emotional support; Rest     Activity Tolerance:    Poor     Please refer to the flowsheet for vital signs taken during this treatment.   After treatment:   [ ]  Patient left in no apparent distress sitting up in chair  [X]  Patient left in no apparent distress in bed  [X]  Call bell left within reach  [ ]  Nursing notified  [ ]  Caregiver present  [ ]  Bed alarm activated     ANGÉLICA Woods  Time Calculation: 15 mins

## 2017-05-12 NOTE — PROGRESS NOTES
Received drowsy,aroused easily. Call light,phone with in reach. Hob elevated. 5/12/17 0600 Declined sponge bath.

## 2017-05-12 NOTE — PROGRESS NOTES
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 Continue gabapentin 600mg tid for her painful paresthesias. Needs rehab when available. Switched her to PO thiamine supplementation. Already on folate supplementation orally.       Needs rehab.     At this point I will sign off as she is just waiting on rehab.        Interim History: No new changes. Neurological examination:   · Appearance: In no acute distress,   ·     · Mental status examination: Alert and oriented X 3. Cranial Nerves:               II: visual fields Full to confrontation   II: pupils Equal, round, reactive to light   III,VII: ptosis none   III,IV,VI: extraocular muscles  Full ROM   V: facial light touch sensation       V,VII: corneal reflex       VII: facial muscle function  Normal smile. VIII: hearing      IX: soft palate elevation       X phonation      XI: sternocleidomastoid strength      XII: tongue strength           · Motor exam: Station, gait: deferred  Wiggles ankles and toes bilaterally. Cannot lift legs off bed. Can move hands and flex elbows against gravity. Also can lift arms up off the bed a little but bilaterally but does not have full antigravity strength.

## 2017-05-12 NOTE — PROGRESS NOTES
INFECTIOUS DISEASE FOLLOW UP NOTE :-       N.B. We are available over weekend and plan to f/u Monday. Dr Nataliia Gavin is on call and can be reached at [phone] if any problem or question for ID prior. Admit Date: 4/26/2017    Current abx Prior abx   Doxy 5/9-3 Cefepime 4/28 - 0, Ceftriaxone 4/29 - 1      ASSESSMENT - > REC:      Guilain [place] syndrome  - suspect precipitating factor was acute viral pharyngitis (7 days PTA) No recent flu-like illness, diarrheal episodes, no recent vaccinations, no recent travel  - currently no evidence of pharyngeal inflammation or any other active infection -> completed IVIG per Neurology  ->concern for depression vs conversion disorder-  appreciate Psych eval  -> still no stool cx to check for Campylobacter (last BM 5/6)  -> CMV IgG sig elevated but neg IgM as for Lyme- s/o old disease   Severe Hypokalemia on admission  - due to poor po intake  - corrected - resolved   Abnormal CSF  - elevated protein, normal glucose without leukorrhachia  - c/w GBS - CSF studies not s/o infection    Abnormal LFT's  - has diffuse hepatic steatosis on CT +/- dehydration  - transaminases, alk phos improving - Slightly better   New Otitis media- c/o pain in ears and decreased hearing - On Doxy per IM for ?7 days  - says better with Pseudophed  - Monitor LFTs as can get worse   HTN      COPD     OA/ RA     Hyperlipidemia     ? Chronic Fatigue Syndrome     h/o Lyme Disease  - dx'd ~ 2001 rx'd w/ 2 1-month courses doxycyline 2004 and 2006 and subsequently w/ multiple persistent complaints.  multiple + IgG western blots and negative IgM western blots dating back to 2003 (per Dr. Arnie Oh - [place], 2011: no further treatment recommended) - IgG positive and IgM negative- as expected with her h/o Lyme in past      MICROBIOLOGY:   4/26 blcx NG x 2          urcx  NG  MRSA scrn neg  5/5 CSF cx ntd     LINES AND CATHETERS:   piv     MEDICATIONS: Current Facility-Administered Medications   Medication Dose Route Frequency    Thiamine Mononitrate (B-1) tablet 100 mg  100 mg Oral DAILY    gabapentin (NEURONTIN) capsule 600 mg  600 mg Oral TID    pseudoephedrine (SUDAFED) tablet 30 mg  30 mg Oral Q6H    doxycycline (MONODOX) capsule 100 mg  100 mg Oral Q12H    senna (SENOKOT) tablet 17.2 mg  2 Tab Oral QHS    oxyCODONE IR (ROXICODONE) tablet 10 mg  10 mg Oral I1A PRN    folic acid (FOLVITE) tablet 1 mg  1 mg Oral DAILY    acetaminophen (TYLENOL) tablet 650 mg  650 mg Oral Q4H PRN    scopolamine (TRANSDERM-SCOP) 1.5 mg  1.5 mg TransDERmal Q72H    QUEtiapine (SEROquel) tablet 50 mg  50 mg Oral QHS    citalopram (CELEXA) tablet 20 mg  20 mg Oral DAILY    benzocaine-menthol (CEPACOL) lozenge 1 Lozenge  1 Lozenge Mucous Membrane PRN    enoxaparin (LOVENOX) injection 40 mg  40 mg SubCUTAneous Q24H    ondansetron (ZOFRAN) injection 4 mg  4 mg IntraVENous Q4H PRN    LORazepam (ATIVAN) tablet 1 mg  1 mg Oral Q6H PRN    oxymetazoline (AFRIN) 0.05 % nasal spray 2 Spray  2 Spray Both Nostrils BID PRN    sodium chloride (NS) flush 5-10 mL  5-10 mL IntraVENous PRN       SUBJECTIVE :     Interval notes reviewed. Afebrile. Continue to c/o not feeling any better and with spasms and weakness. D/w her that all results for infection negative and she will need to work with PT to help get strength back. OBJECTIVE     Visit Vitals    /72 (BP 1 Location: Left arm, BP Patient Position: At rest)    Pulse (!) 118    Temp 98.4 °F (36.9 °C)    Resp 18    Ht 5' 4\" (1.626 m)    Wt 68.1 kg (150 lb 2.1 oz)    SpO2 96%    BMI 25.77 kg/m2       Temp (24hrs), Av.8 °F (37.1 °C), Min:98.4 °F (36.9 °C), Max:99.1 °F (37.3 °C)    Gen-WDWN WF lying in bed  HENT- No thrush  Chest- Symmetric expansion, CTA  CV-S1S2 RR, No JVD, No edema  Abd-Soft, NT, ND, BS+  Skin-No jaundice, cyanosis, clubbing.  No decubitus  Neuro: awake and oriented, bilateral upper and lower extremity weakness, Rt>left, no NR, . Labs: Results:   Chemistry Recent Labs      05/12/17   0450   GLU  90   NA  141   K  3.4*   CL  107   CO2  23   BUN  4*   CREA  0.40*   CA  8.7   AGAP  11   BUCR  10*      CBC w/Diff Recent Labs      05/12/17   0450   WBC  6.1   RBC  3.35*   HGB  10.3*   HCT  30.3*   PLT  382   GRANS  33*   LYMPH  53*   EOS  1          RADIOLOGY :          Imaging    Results from Hospital Encounter encounter on 04/26/17   XR SPINAL PUNC LUMB DX   Narrative History:[place]    COMPARISON: MRI brain 4/29/2017. Informed consent and utilizing sterile technique and local anesthetic a  diagnostic lumbar puncture was performed under fluoroscopy utilizing a posterior  midline approach at L2-3. A 20-gauge needle was used. 4 tubes of clear CSF  forwarded to laboratory. No complications. Timeout performed at 0908. Fluoro time:  3.7 minutes  Radiation dose: (Reference air kerma): 162.89 mGy    Fluoroscopy was used for guidance to document position of the needle with images  saved under fluoroscopy. Impression IMPRESSION:  1. Diagnostic fluoroscopic lumbar puncture performed without complication. Patient transported back to the brower. Results from [place] encounter on 04/26/17   CT ABD PELV W CONT   Narrative CT OF THE ABDOMEN AND PELVIS, WITH CONTRAST    INDICATION: Upper abdominal pain and abnormal LFTs    COMPARISON: 8/8/2016. TECHNIQUE: Standard helical CT performed through the abdomen and pelvis with IV  contrast.  Coronal and sagittal reformations were generated. One or more dose reduction techniques were used on this CT: automated exposure  control, adjustment of the mAs and/or kVp according to patient's size, and  iterative reconstruction techniques.  The specific techniques utilized on this CT  exam have been documented in the patient's electronic medical record.    ============    FINDINGS:    INFERIOR THORAX: Moderate right and mild left dependent atelectasis. No acute  pulmonary infiltrate. Mild global cardiomegaly. No pericardial effusion. LIVER/BILIARY: Diffuse hepatic steatosis. No suspicious liver lesion. No biliary  dilation. Punctate gallstones with no secondary findings of cholecystitis. SPLEEN: Normal.    PANCREAS: Normal.    ADRENALS: Normal.    KIDNEYS: Exophytic right upper pole renal cyst. No acute or suspicious renal  abnormality. Jackelyn Starling LYMPH NODES: No mesenteric or retroperitoneal lymphadenopathy. GI TRACT: No wall thickening or dilation. Small hiatal hernia. Normal appendix. Scattered colonic diverticulosis, with no findings of an acute diverticulitis. VASCULATURE: Normal caliber distal thoracic and abdominal aorta. PELVIC ORGANS: Haley catheter is present within the nondistended bladder. Heterogeneously enhancing endometrial complex. BONES: No acute osseous abnormality. Degenerative changes of the thoracic spine. OTHER: No ascites or free intraperitoneal air.    ============         Impression IMPRESSION:    No acute or focal abnormality to explain patient's abdominal pain. There is  diffuse hepatic steatosis. Cholelithiasis. Colonic diverticulosis with no  findings of an acute diverticulitis. Note: Preliminary report sent to the Emergency Department by the radiology  resident at the time of the study.           Niurka Fuentes MD  May 12, 2017    [facility] AT THE [facility]  [phone]

## 2017-05-12 NOTE — PROGRESS NOTES
Bedside and Verbal shift change report given to PRISCILA Silva RN (oncoming nurse) by Ben Pleitez RN (offgoing nurse). Report included the following information SBAR, Kardex, Intake/Output and MAR. Call bell in reach. 3103 Shift assessment complete as documented. 0915 PRN oxycodone administered for complaints of pain. AM meds given with no incidence. Patient prefers to take medications one at a time but has no issues swallowing meds whole    1347 PRN oxycodone administered for complaints of pain. 1905 PRN oxycodone administered for complaints of pain. Bedside and Verbal shift change report given to Ben Pleitez RN (oncoming nurse) by Carol Lockett RN (offgoing nurse). Report included the following information SBAR, Kardex, Intake/Output and MAR. Call bell in reach.

## 2017-05-13 PROBLEM — G82.50 QUADRIPARESIS (HCC): Status: ACTIVE | Noted: 2017-05-13

## 2017-05-13 PROCEDURE — 74011250637 HC RX REV CODE- 250/637: Performed by: PSYCHIATRY & NEUROLOGY

## 2017-05-13 PROCEDURE — 94760 N-INVAS EAR/PLS OXIMETRY 1: CPT

## 2017-05-13 PROCEDURE — 65660000000 HC RM CCU STEPDOWN

## 2017-05-13 PROCEDURE — 74011250637 HC RX REV CODE- 250/637: Performed by: HOSPITALIST

## 2017-05-13 PROCEDURE — 74011250636 HC RX REV CODE- 250/636: Performed by: FAMILY MEDICINE

## 2017-05-13 PROCEDURE — 74011250637 HC RX REV CODE- 250/637: Performed by: INTERNAL MEDICINE

## 2017-05-13 PROCEDURE — 77030020256 HC SOL INJ NACL 0.9%  500ML

## 2017-05-13 RX ORDER — SODIUM CHLORIDE 9 MG/ML
250 INJECTION, SOLUTION INTRAVENOUS ONCE
Status: COMPLETED | OUTPATIENT
Start: 2017-05-13 | End: 2017-05-13

## 2017-05-13 RX ORDER — DEXTROSE, SODIUM CHLORIDE, AND POTASSIUM CHLORIDE 5; .45; .15 G/100ML; G/100ML; G/100ML
100 INJECTION INTRAVENOUS CONTINUOUS
Status: DISCONTINUED | OUTPATIENT
Start: 2017-05-13 | End: 2017-05-19 | Stop reason: HOSPADM

## 2017-05-13 RX ADMIN — SENNOSIDES 17.2 MG: 8.6 TABLET, FILM COATED ORAL at 22:20

## 2017-05-13 RX ADMIN — LORATADINE 10 MG: 10 TABLET ORAL at 09:47

## 2017-05-13 RX ADMIN — CITALOPRAM HYDROBROMIDE 20 MG: 20 TABLET ORAL at 09:47

## 2017-05-13 RX ADMIN — OXYCODONE HYDROCHLORIDE 10 MG: 5 TABLET ORAL at 14:14

## 2017-05-13 RX ADMIN — Medication 100 MG: at 09:47

## 2017-05-13 RX ADMIN — SODIUM CHLORIDE 250 ML: 900 INJECTION, SOLUTION INTRAVENOUS at 01:03

## 2017-05-13 RX ADMIN — FOLIC ACID 1 MG: 1 TABLET ORAL at 09:47

## 2017-05-13 RX ADMIN — DEXTROSE MONOHYDRATE, SODIUM CHLORIDE, AND POTASSIUM CHLORIDE 75 ML/HR: 50; 4.5; 1.49 INJECTION, SOLUTION INTRAVENOUS at 14:20

## 2017-05-13 RX ADMIN — PSEUDOEPHEDRINE HCL 30 MG: 30 TABLET, FILM COATED ORAL at 18:43

## 2017-05-13 RX ADMIN — HYDROCORTISONE 5 ML: 10 TABLET ORAL at 18:48

## 2017-05-13 RX ADMIN — DEXTROSE MONOHYDRATE, SODIUM CHLORIDE, AND POTASSIUM CHLORIDE 75 ML/HR: 50; 4.5; 1.49 INJECTION, SOLUTION INTRAVENOUS at 02:13

## 2017-05-13 RX ADMIN — GABAPENTIN 600 MG: 300 CAPSULE ORAL at 04:06

## 2017-05-13 RX ADMIN — GABAPENTIN 600 MG: 300 CAPSULE ORAL at 18:44

## 2017-05-13 RX ADMIN — ENOXAPARIN SODIUM 40 MG: 40 INJECTION SUBCUTANEOUS at 04:06

## 2017-05-13 RX ADMIN — PSEUDOEPHEDRINE HCL 30 MG: 30 TABLET, FILM COATED ORAL at 09:47

## 2017-05-13 RX ADMIN — PSEUDOEPHEDRINE HCL 30 MG: 30 TABLET, FILM COATED ORAL at 02:12

## 2017-05-13 RX ADMIN — GABAPENTIN 600 MG: 300 CAPSULE ORAL at 11:04

## 2017-05-13 RX ADMIN — OXYCODONE HYDROCHLORIDE 10 MG: 5 TABLET ORAL at 18:53

## 2017-05-13 RX ADMIN — OXYCODONE HYDROCHLORIDE 10 MG: 5 TABLET ORAL at 09:54

## 2017-05-13 RX ADMIN — QUETIAPINE FUMARATE 50 MG: 25 TABLET, FILM COATED ORAL at 22:20

## 2017-05-13 RX ADMIN — HYDROCORTISONE 5 ML: 10 TABLET ORAL at 22:24

## 2017-05-13 NOTE — PROGRESS NOTES
Received awake,alert,in no acute distress. Assisted with repositioning,hob elevated. Prevalon boot to right foot in tact.

## 2017-05-13 NOTE — ROUTINE PROCESS
Bedside and Verbal shift change report given to Yesenia (oncoming nurse) by neil alford rn (offgoing nurse). Report given with SBAR, Kardex, Intake/Output and Recent Results.

## 2017-05-13 NOTE — PROGRESS NOTES
FVC done on patient this am with poor effort  1.3 liters average.  patient upset she has  Not had any of her medications this morning will continue to monitor

## 2017-05-13 NOTE — PROGRESS NOTES
[facility]  Hospitalist Division    Daily progress Note    Patient: Susy Roberson MRN: [id number]  CSN: [id number]    YOB: 1960  Age: 60 y.o. Sex: female    DOA: 4/26/2017 LOS:  LOS: 16 days                    Subjective:     CC: weakness    Pt is alert and awake, NAD  She c/o difficulty eating         Objective:      Visit Vitals    /80 (BP 1 Location: Left arm, BP Patient Position: Head of bed elevated (Comment degrees))    Pulse (!) 111    Temp 99.8 °F (37.7 °C)    Resp 22    Ht 5' 4\" (1.626 m)    Wt 68.1 kg (150 lb 2.1 oz)    SpO2 99%    BMI 25.77 kg/m2       Physical Exam:  NC/AT  OP clear, no thrush   NO JVD,TMG  S1/S2 RRR  CTAB, NO WHEEZING  NT,ND, NABS  NO EDEMA  MS 3+/5 in LE, 4/5 UE, Dec ROM Rt shoulder         Intake and Output:  Current Shift:     Last three shifts:  05/11 1901 - 05/13 0700  In: 426.3 [P.O.:120; I.V.:306.3]  Out: 2450 [Urine:2450]    No results found for this or any previous visit (from the past 24 hour(s)).       Current Facility-Administered Medications:     dextrose 5% - 0.45% NaCl with KCl 20 mEq/L infusion, 75 mL/hr, IntraVENous, CONTINUOUS, Viktoriya Recio MD, Last Rate: 75 mL/hr at 05/13/17 0213, 75 mL/hr at 05/13/17 0213    loratadine (CLARITIN) tablet 10 mg, 10 mg, Oral, DAILY, Waldemar Mack MD, 10 mg at 05/13/17 0947    Thiamine Mononitrate (B-1) tablet 100 mg, 100 mg, Oral, DAILY, Jermaine Evangelista MD, 100 mg at 05/13/17 0947    gabapentin (NEURONTIN) capsule 600 mg, 600 mg, Oral, TID, Jermaine Evangelista MD, 600 mg at 05/13/17 0406    pseudoephedrine (SUDAFED) tablet 30 mg, 30 mg, Oral, Q6H, Nilay Patrick MD, 30 mg at 05/13/17 0947    senna (SENOKOT) tablet 17.2 mg, 2 Tab, Oral, QHS, Nilton Soria DO, 17.2 mg at 05/11/17 2245    oxyCODONE IR (ROXICODONE) tablet 10 mg, 10 mg, Oral, Q4H PRN, Arianna Fernandez DO, 10 mg at 65/57/87 2999    folic acid (FOLVITE) tablet 1 mg, 1 mg, Oral, DAILY, Fe Clinton MD, 1 mg at 05/13/17 0947    acetaminophen (TYLENOL) tablet 650 mg, 650 mg, Oral, Q4H PRN, Jasmin Kovacs MD    scopolamine (TRANSDERM-SCOP) 1.5 mg, 1.5 mg, TransDERmal, Q72H, Ulloa Presser, DO, 1.5 mg at 05/12/17 1847    QUEtiapine (SEROquel) tablet 50 mg, 50 mg, Oral, QHS, Ulloa Presser, DO, 50 mg at 05/12/17 2212    citalopram (CELEXA) tablet 20 mg, 20 mg, Oral, DAILY, Ulloa Presser, DO, 20 mg at 05/13/17 0947    benzocaine-menthol (CEPACOL) lozenge 1 Lozenge, 1 Lozenge, Mucous Membrane, PRN, Ulloa Presser, DO, 1 Lozenge at 04/28/17 1659    enoxaparin (LOVENOX) injection 40 mg, 40 mg, SubCUTAneous, Q24H, Viktoriya Recio MD, 40 mg at 05/13/17 0406    ondansetron (ZOFRAN) injection 4 mg, 4 mg, IntraVENous, Q4H PRN, Zeferino Eagle MD, 4 mg at 04/30/17 1159    LORazepam (ATIVAN) tablet 1 mg, 1 mg, Oral, Q6H PRN, [place] Presser, DO, 1 mg at 05/09/17 1509    oxymetazoline (AFRIN) 0.05 % nasal spray 2 Spray, 2 Spray, Both Nostrils, BID PRN, Ulloa Presser, DO    sodium chloride (NS) flush 5-10 mL, 5-10 mL, IntraVENous, PRN, Dav Gutiérrez MD, 10 mL at 05/05/17 0521    Lab Results   Component Value Date/Time    Glucose 90 05/12/2017 04:50 AM    Glucose 93 05/09/2017 06:25 AM    Glucose 79 05/08/2017 09:13 AM    Glucose 128 05/07/2017 03:15 AM    Glucose 69 05/06/2017 04:30 AM        Assessment/Plan     Principal Problem:    Encephalopathy (4/27/2017)    Active Problems:    Dehydration (4/27/2017)      Hypokalemia (4/27/2017)      Lactic acidosis (4/27/2017)      Polycythemia ([address] (4/27/2017)      Leukocytosis (4/27/2017)      UTI (urinary tract infection) (4/27/2017)      Elevated liver enzymes (4/27/2017)      Acute inflammatory polyneuropathy (HCC) (5/5/2017)      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 ([address] (5/5/2017)      Quadriparesis ([address] (5/13/2017)      - GBS  Axonal Type  Completed IVIG for 5 days   - UTI  - Transaminitis Likely 2/2 Fatty Liver  - COPD  - Dysphagia  - Depression w/ Possible Component of Psychosis, on celexa and seroquel   - Acute Metabolic Encephalopathy, resolved  - AICHA, resolved  - Replete lytes   - otitis media, PCN and levaquin allergy, on doxy  - chronic pain   - thiamine and folate def, on supplement   - allergic rhinitis, add claritin  - dysphagia, use magic mouthwash  SLP to eval   - transient hypotension 2/2 poor po intake, no sign of sepsis       Placement in process         Hunter Monson MD  5/13/2017, 4:17 PM

## 2017-05-13 NOTE — ROUTINE PROCESS
Bedside and Verbal shift change report given to Sister EVELIO RN (oncoming nurse) by Geetha Godinez RN, BSN (offgoing nurse). Report given with SBAR, Kardex, Intake/Output, MAR and Recent Results.

## 2017-05-13 NOTE — PROGRESS NOTES
Bed side report given by BASIL Coulter.said that has increased heart rate called Dr Kaycee Hylton waiting call back. Pt in bed resting alert and oriented x 4  States pain level is 2/10 at the moment. Pt Robinson right ear. Assessement completed plan of care for the shift explained pt verbalized understanding. IVF infusing well, HOB elevated, bed low and in locked position. Call light and frequently used items within reach. 18- Pt's friend by the name Lise Michael called to check on pt. He wanted to know what is going on with the pt in regard to diagnosis and what is being done for her. The man said that he is a friend, they have been together for 18 years c/o of not been able to told what is going on. He added that he came to see the doctor during the day but unable he found doctors had done rounding and so he had to leave for work. The nurse explained the situation and due to HIPPA no information given, and what he wanted only the doctors can explained, pt verbalized understanding and promised that he will come in tomorrow night spend the night with pt so that he would be able to meet and speak to the doctor. 0515- Pt given a bed bath linen changed pt made comfortable in bed. IVF infusing well, abraham catheter draining yellow urine. During bath pt's skin noted to be jaundiced. Bedside and Verbal shift change report given to  BASIL Coulter (oncoming nurse) by Ailyn Gaffney RN (offgoing nurse). Report included the following information SBAR, Kardex, Intake/Output, MAR and Recent Results.

## 2017-05-13 NOTE — PROGRESS NOTES
[facility]  5/13/2017    VSS AFeb    No events    CXR: only one since admission & it was normal          PEx;  General/Neuro:   Awake and alert. Seemed in no distress but RR picked up during encounter. RA  Again says she feel terrible no better. Moves both feet; still unable to lift legs to gravity and resists passive movement - \"hurts all over\" & yells upon touching almost anywhere. Moves BUE & BUE. Tremor? Says she can;t push the button on remote and it did look like she was trying. No discernable CN defects  PERRL&A   HEENT: Anicteric   * poor dentition  * no thrush. Cooperated better with oral exam.  * right nasal congestion has improved; ok airflow bilat nares  * tender \"everywhere\" but continues to maybe have legitimate sinus tenderness right max  * no EAC abnormalities  Neck: no mass or MELISA; No JVD  Resp: Symmetrical chest expansion, no accessory muscle use; good airway entry; no rales/ wheezing/ rhonchi; breath sounds are normal  CV: Regular, no murmur  : abraham yellow  GI: Abdomen soft, non-tender; (+) active bowel sounds; mod obese  Extremities: Symmetric soft +2 pulses; no CCE  * signif cellulitis/ phlebitis RUE dorsal due to PIV infiltration  * no joint inflammation  Skin: WNL; Better exam than 5/11. There is no rash. MEDS;            IMPRESSION  # See 5/11  # No incipient resp failure. Her FVC values are low, but this is an effort dependent test.   # Chronic sinusitis due to allergies +/- recent viral infection  # Diffuse pain w/ ?psych overlay    PLAN:  Will sign off  Please call for any questions or concerns  She will need FU with neurologist mainly. Her ENT problems can likely be handled by primary care unless prove refractory over time.  I again recommended basal saline & steroids but she declined.  -ashley  [phone]

## 2017-05-13 NOTE — PROGRESS NOTES
0020-Pt received AO. No complaints of pain. Bolus of 250 given for  BP 87/57. Haley in place draining nicholas urine. Bolus started to L wrist IV. Repositioned in bed. Encourged to eat dinner and drink fluids. 0140- BP increased to 91/61. Pt resting quietly with eyes closed. No complaints at this time. Encouraged fluids. 0150- Made oncall aware. N.O. D51/2 NS with 20 mEq @ 75 ml/hr. Will encourage to drink fluids. 1974- IV fluids started. Repositioned in bed. Encouraged fluids. 1686- MD nagy updated about MEWS score of 4. Will cont to monitor per MD. Patient is resting comfortably. She has no complaints. Encouraged to drink fluids. Repositioned to L side. 0730-Bedside shift change report given to Michell Kern (oncoming nurse) by Ella Corral Rn (offgoing nurse). Report included the following information SBAR, Kardex, Intake/Output, MAR and Recent Results.

## 2017-05-13 NOTE — PROGRESS NOTES
Patient received in bed awake. Patient A&Ox4, denies pain and discomfort. No distress noted. Frequently use items within reach. Bed locked in low position. Call bell within reach and Patient verbalized understanding of use for assistance and needs. 1841- Patient , denies chest pain. Dr. Lolly Smith was called; awaiting call back.

## 2017-05-14 ENCOUNTER — APPOINTMENT (OUTPATIENT)
Dept: GENERAL RADIOLOGY | Age: 57
DRG: 049 | End: 2017-05-14
Attending: INTERNAL MEDICINE
Payer: MEDICAID

## 2017-05-14 LAB
ANION GAP BLD CALC-SCNC: 11 MMOL/L (ref 3–18)
BASOPHILS # BLD AUTO: 0 K/UL (ref 0–0.06)
BASOPHILS # BLD: 0 % (ref 0–2)
BUN SERPL-MCNC: 5 MG/DL (ref 7–18)
BUN/CREAT SERPL: 11 (ref 12–20)
CALCIUM SERPL-MCNC: 7.9 MG/DL (ref 8.5–10.1)
CHLORIDE SERPL-SCNC: 108 MMOL/L (ref 100–108)
CO2 SERPL-SCNC: 21 MMOL/L (ref 21–32)
CREAT SERPL-MCNC: 0.47 MG/DL (ref 0.6–1.3)
DIFFERENTIAL METHOD BLD: ABNORMAL
EOSINOPHIL # BLD: 0 K/UL (ref 0–0.4)
EOSINOPHIL NFR BLD: 0 % (ref 0–5)
ERYTHROCYTE [DISTWIDTH] IN BLOOD BY AUTOMATED COUNT: 18.9 % (ref 11.6–14.5)
GLUCOSE SERPL-MCNC: 92 MG/DL (ref 74–99)
HCT VFR BLD AUTO: 28.5 % (ref 35–45)
HGB BLD-MCNC: 9.7 G/DL (ref 12–16)
LYMPHOCYTES # BLD AUTO: 47 % (ref 21–52)
LYMPHOCYTES # BLD: 2.1 K/UL (ref 0.9–3.6)
MCH RBC QN AUTO: 31 PG (ref 24–34)
MCHC RBC AUTO-ENTMCNC: 34 G/DL (ref 31–37)
MCV RBC AUTO: 91.1 FL (ref 74–97)
MONOCYTES # BLD: 0.6 K/UL (ref 0.05–1.2)
MONOCYTES NFR BLD AUTO: 12 % (ref 3–10)
NEUTS SEG # BLD: 1.9 K/UL (ref 1.8–8)
NEUTS SEG NFR BLD AUTO: 41 % (ref 40–73)
PLATELET # BLD AUTO: 377 K/UL (ref 135–420)
PMV BLD AUTO: 10.2 FL (ref 9.2–11.8)
POTASSIUM SERPL-SCNC: 3.6 MMOL/L (ref 3.5–5.5)
RBC # BLD AUTO: 3.13 M/UL (ref 4.2–5.3)
SODIUM SERPL-SCNC: 140 MMOL/L (ref 136–145)
WBC # BLD AUTO: 4.6 K/UL (ref 4.6–13.2)

## 2017-05-14 PROCEDURE — 74011250637 HC RX REV CODE- 250/637: Performed by: INTERNAL MEDICINE

## 2017-05-14 PROCEDURE — 87040 BLOOD CULTURE FOR BACTERIA: CPT | Performed by: INTERNAL MEDICINE

## 2017-05-14 PROCEDURE — 77030027138 HC INCENT SPIROMETER -A

## 2017-05-14 PROCEDURE — 74011250637 HC RX REV CODE- 250/637: Performed by: HOSPITALIST

## 2017-05-14 PROCEDURE — 74011250637 HC RX REV CODE- 250/637: Performed by: PSYCHIATRY & NEUROLOGY

## 2017-05-14 PROCEDURE — A6209 FOAM DRSG <=16 SQ IN W/O BDR: HCPCS

## 2017-05-14 PROCEDURE — 74011250636 HC RX REV CODE- 250/636: Performed by: FAMILY MEDICINE

## 2017-05-14 PROCEDURE — 65660000001 HC RM ICU INTERMED STEPDOWN

## 2017-05-14 PROCEDURE — 80048 BASIC METABOLIC PNL TOTAL CA: CPT | Performed by: INTERNAL MEDICINE

## 2017-05-14 PROCEDURE — 85025 COMPLETE CBC W/AUTO DIFF WBC: CPT | Performed by: INTERNAL MEDICINE

## 2017-05-14 PROCEDURE — 87086 URINE CULTURE/COLONY COUNT: CPT | Performed by: INTERNAL MEDICINE

## 2017-05-14 PROCEDURE — 71020 XR CHEST AP LAT: CPT

## 2017-05-14 PROCEDURE — 77030020186 HC BOOT HL PROTCT SAGE -B

## 2017-05-14 PROCEDURE — 87106 FUNGI IDENTIFICATION YEAST: CPT | Performed by: INTERNAL MEDICINE

## 2017-05-14 PROCEDURE — 77030032490 HC SLV COMPR SCD KNE COVD -B

## 2017-05-14 PROCEDURE — 36415 COLL VENOUS BLD VENIPUNCTURE: CPT | Performed by: INTERNAL MEDICINE

## 2017-05-14 RX ORDER — SODIUM CHLORIDE 0.9 % (FLUSH) 0.9 %
5-10 SYRINGE (ML) INJECTION AS NEEDED
Status: CANCELLED | OUTPATIENT
Start: 2017-05-14

## 2017-05-14 RX ORDER — PSEUDOEPHEDRINE HCL 30 MG
30 TABLET ORAL
Status: DISCONTINUED | OUTPATIENT
Start: 2017-05-14 | End: 2017-05-19 | Stop reason: HOSPADM

## 2017-05-14 RX ADMIN — DEXTROSE MONOHYDRATE, SODIUM CHLORIDE, AND POTASSIUM CHLORIDE 75 ML/HR: 50; 4.5; 1.49 INJECTION, SOLUTION INTRAVENOUS at 03:43

## 2017-05-14 RX ADMIN — OXYCODONE HYDROCHLORIDE 10 MG: 5 TABLET ORAL at 21:45

## 2017-05-14 RX ADMIN — HYDROCORTISONE 5 ML: 10 TABLET ORAL at 10:30

## 2017-05-14 RX ADMIN — PSEUDOEPHEDRINE HCL 30 MG: 30 TABLET, FILM COATED ORAL at 06:05

## 2017-05-14 RX ADMIN — ACETAMINOPHEN 650 MG: 325 TABLET, FILM COATED ORAL at 18:46

## 2017-05-14 RX ADMIN — OXYCODONE HYDROCHLORIDE 10 MG: 5 TABLET ORAL at 03:48

## 2017-05-14 RX ADMIN — Medication 100 MG: at 10:25

## 2017-05-14 RX ADMIN — LORAZEPAM 1 MG: 1 TABLET ORAL at 21:45

## 2017-05-14 RX ADMIN — HYDROCORTISONE 5 ML: 10 TABLET ORAL at 17:31

## 2017-05-14 RX ADMIN — OXYCODONE HYDROCHLORIDE 10 MG: 5 TABLET ORAL at 10:24

## 2017-05-14 RX ADMIN — OXYCODONE HYDROCHLORIDE 10 MG: 5 TABLET ORAL at 16:47

## 2017-05-14 RX ADMIN — FOLIC ACID 1 MG: 1 TABLET ORAL at 10:28

## 2017-05-14 RX ADMIN — CITALOPRAM HYDROBROMIDE 20 MG: 20 TABLET ORAL at 10:24

## 2017-05-14 RX ADMIN — GABAPENTIN 600 MG: 300 CAPSULE ORAL at 17:31

## 2017-05-14 RX ADMIN — QUETIAPINE FUMARATE 50 MG: 25 TABLET, FILM COATED ORAL at 21:44

## 2017-05-14 RX ADMIN — HYDROCORTISONE 5 ML: 10 TABLET ORAL at 14:58

## 2017-05-14 RX ADMIN — PSEUDOEPHEDRINE HCL 30 MG: 30 TABLET, FILM COATED ORAL at 00:30

## 2017-05-14 RX ADMIN — GABAPENTIN 600 MG: 300 CAPSULE ORAL at 03:43

## 2017-05-14 RX ADMIN — LORATADINE 10 MG: 10 TABLET ORAL at 10:26

## 2017-05-14 RX ADMIN — ENOXAPARIN SODIUM 40 MG: 40 INJECTION SUBCUTANEOUS at 05:02

## 2017-05-14 RX ADMIN — DEXTROSE MONOHYDRATE, SODIUM CHLORIDE, AND POTASSIUM CHLORIDE 75 ML/HR: 50; 4.5; 1.49 INJECTION, SOLUTION INTRAVENOUS at 16:51

## 2017-05-14 RX ADMIN — GABAPENTIN 600 MG: 300 CAPSULE ORAL at 10:29

## 2017-05-14 NOTE — PROGRESS NOTES
Received bedside report from Lovely Gomez RN pt in bed awake noted to be having labored breathing says  I am not  feeling well, day shift nurse giving tylenol for temp 100.4, pt said that tylenol does not work and will not help her in anyway. Nurse encouraged her to try and take it because of the fever, temp will be checked in an hour and intervention made as soon as possible. Pt accepted and took tylenol. On assessment pt looks pale/ jaundiced, abraham  output dark yellow urine, feels febrile, HR last recorded 124. Dr. Radha Mello paged returned call promptly notified about pt condition and the MEWS score 4 orders given to transfer pt to step down unit, do urine culture, blood culture and chest x-ray. 1945- urine specimen collected, phlebotomist at bedside for blood cultures x2  TRANSFER - OUT REPORT:    Verbal report given to Nasreen (name) on [facility]  being transferred to 2800(unit) for change in patient condition(increased heart rate, fever,SOB)       Report consisted of patients Situation, Background, Assessment and   Recommendations(SBAR). Information from the following report(s) SBAR, Kardex, Intake/Output, MAR and Recent Results was reviewed with the receiving nurse. Lines:   Peripheral IV 05/10/17 Left Forearm (Active)   Site Assessment Clean, dry, & intact 5/14/2017  4:50 PM   Phlebitis Assessment 0 5/14/2017  4:50 PM   Infiltration Assessment 0 5/14/2017  4:50 PM   Dressing Status Clean, dry, & intact 5/14/2017  4:50 PM   Dressing Type Tape;Transparent 5/14/2017  4:50 PM   Hub Color/Line Status Blue;Flushed; Infusing 5/14/2017 10:32 AM   Action Taken Open ports on tubing capped 5/14/2017 10:32 AM   Alcohol Cap Used Yes 5/14/2017 10:32 AM        Opportunity for questions and clarification was provided. Patient transported with:   Registered Nurse to radiology. 2043- Pt off the  Floor to radiology via bed  for chest x ray and then to 2802.  Pt was transported with all her personal belongings which were with her in the room.

## 2017-05-14 NOTE — PROGRESS NOTES
Patient received in bed awake. Patient A&Ox4, denies pain and discomfort. No distress noted. Frequently use items within reach. Bed locked in low position. Call bell within reach and Patient verbalized understanding of use for assistance and needs. 1020- Patient c/o generalized pain 10/10, see MAR; ; night nurse reported that Patient may need LFT d/t back with jaundice (noted on bedside report slight yellowish) and Cesar Clinton. Dr. Karla Wolf was called and now at [facility] station made aware of these issues and voiced understanding. 65- Dr. Karla Wolf to [facility] station made aware of Patient 06:00 am MEWS 2 and increased to 4, during  and resp 22 at the time; voiced understanding. 1- Dr. Karla Wolf was called made aware of Patient with scant amount of blood noted at top lip said that she had a nose bleed but no tissue or evidence note, Patient c/o weakness increasing in her hands and legs; urine output to abraham dark appearance of cola. Dr. Karla Wolf asked about Patients IVF made aware D51/2 NS with 20 meq KCL at 75 ml/hr infusing and voiced understanding about Patients weakness. T.O. received to monitor for further nose bleeding and apply pressure to site if occurs; increase IVF to 100 ml/hr. Dr. Karla Wolf made aware K level 3.6 (RBV). [address]- Patient made aware of Tylenol PO for temp 100.4. See MAR . Call bey w/in reach. [place] Dr. Karla Wolf being called by Shift change nurse to be inform of Temp 100.4,  bp 142/94. See her f/u [facility] note.

## 2017-05-14 NOTE — ROUTINE PROCESS
Bedside and Verbal shift change report given to Sister EVELIO RN (oncoming nurse) by Monalisa Mcfarland RN, BSN (offgoing nurse).   Report given with SBAR, Kardex, Intake/Output, MAR and Recent Results

## 2017-05-14 NOTE — ROUTINE PROCESS
Bedside and Verbal shift change report given to BASIL piedra (oncoming nurse) by Dino Ortiz RN (offgoing nurse). Report included the following information SBAR, Kardex, Intake/Output, MAR and Recent Results.

## 2017-05-14 NOTE — PROGRESS NOTES
SLP evaluation orders received and acknowledged. Pt currently on SLP caseload, will f/u as indicated in [address].      Thank you for this referral.    Severo Bickers, M.S. CCC-SLP/L  Speech-Language Pathologist

## 2017-05-15 PROCEDURE — 92526 ORAL FUNCTION THERAPY: CPT

## 2017-05-15 PROCEDURE — 74011250637 HC RX REV CODE- 250/637: Performed by: PSYCHIATRY & NEUROLOGY

## 2017-05-15 PROCEDURE — 65660000000 HC RM CCU STEPDOWN

## 2017-05-15 PROCEDURE — 74011250636 HC RX REV CODE- 250/636: Performed by: INTERNAL MEDICINE

## 2017-05-15 PROCEDURE — 74011250637 HC RX REV CODE- 250/637: Performed by: HOSPITALIST

## 2017-05-15 PROCEDURE — 51798 US URINE CAPACITY MEASURE: CPT

## 2017-05-15 PROCEDURE — A6209 FOAM DRSG <=16 SQ IN W/O BDR: HCPCS

## 2017-05-15 PROCEDURE — 74011250637 HC RX REV CODE- 250/637: Performed by: INTERNAL MEDICINE

## 2017-05-15 RX ADMIN — QUETIAPINE FUMARATE 50 MG: 25 TABLET, FILM COATED ORAL at 21:26

## 2017-05-15 RX ADMIN — OXYCODONE HYDROCHLORIDE 10 MG: 5 TABLET ORAL at 03:11

## 2017-05-15 RX ADMIN — SENNOSIDES 17.2 MG: 8.6 TABLET, FILM COATED ORAL at 21:26

## 2017-05-15 RX ADMIN — GABAPENTIN 600 MG: 300 CAPSULE ORAL at 18:00

## 2017-05-15 RX ADMIN — HYDROCORTISONE 5 ML: 10 TABLET ORAL at 09:04

## 2017-05-15 RX ADMIN — HYDROCORTISONE 5 ML: 10 TABLET ORAL at 21:27

## 2017-05-15 RX ADMIN — Medication 100 MG: at 08:54

## 2017-05-15 RX ADMIN — PSEUDOEPHEDRINE HCL 30 MG: 30 TABLET, FILM COATED ORAL at 09:03

## 2017-05-15 RX ADMIN — LORATADINE 10 MG: 10 TABLET ORAL at 08:54

## 2017-05-15 RX ADMIN — HYDROCORTISONE 5 ML: 10 TABLET ORAL at 13:20

## 2017-05-15 RX ADMIN — LORAZEPAM 1 MG: 1 TABLET ORAL at 09:03

## 2017-05-15 RX ADMIN — DEXTROSE MONOHYDRATE, SODIUM CHLORIDE, AND POTASSIUM CHLORIDE 100 ML/HR: 50; 4.5; 1.49 INJECTION, SOLUTION INTRAVENOUS at 04:35

## 2017-05-15 RX ADMIN — FOLIC ACID 1 MG: 1 TABLET ORAL at 08:54

## 2017-05-15 RX ADMIN — CITALOPRAM HYDROBROMIDE 20 MG: 20 TABLET ORAL at 08:54

## 2017-05-15 RX ADMIN — OXYCODONE HYDROCHLORIDE 10 MG: 5 TABLET ORAL at 09:03

## 2017-05-15 RX ADMIN — GABAPENTIN 600 MG: 300 CAPSULE ORAL at 03:11

## 2017-05-15 RX ADMIN — DEXTROSE MONOHYDRATE, SODIUM CHLORIDE, AND POTASSIUM CHLORIDE 100 ML/HR: 50; 4.5; 1.49 INJECTION, SOLUTION INTRAVENOUS at 16:00

## 2017-05-15 RX ADMIN — OXYCODONE HYDROCHLORIDE 10 MG: 5 TABLET ORAL at 19:54

## 2017-05-15 RX ADMIN — LORAZEPAM 1 MG: 1 TABLET ORAL at 21:26

## 2017-05-15 NOTE — PROGRESS NOTES
Nutrition RE-ASSESSMENT /GLYCEMIC CONTROL Plan of care      RECOMMENDATIONS:   Pt seen on ICU and meal rounds this am. She requires assistance with feeding. Encourage increased intake at meals to meet calorie and protein requirements. GOALS:   PO intake meets >75% of protein/calorie needs by 5/20. Weight Maintenance +/- 1-2 lb by 5/20. ASSESSMENT:   Weight status is classified as overweight per BMI of 27.6kg/m2. Intake of the breakfast meal ~50% of meal.    Patient with  hypoalbuminemia as evidenced by albumin =2. 4.  Nutrient deficit related to Axonal Guillain- [place] syndrome as evidenced by inadequate intake at meals this admission. Altered nutrition-related lab values: pt meets criteria for prediabetes as evidenced by A1c of 5.9%, BG well controlled this admission without insulin. Pt with mild dysphagia, SLP assessment reviewed. Pt for modified barium swallow 5/16. SUBJECTIVE/OBJECTIVE:    Information Obtained from: Pt stated she can only eat out of one side of her mouth and is eating as much as she can. [x] Pt and Interdisciplinary Meeting/Rounds  Diet: Mechanical soft diet  (has refused Ensure this admission)  Medications: [x] Reviewed    Allergies: [x] Reviewed   Encounter Diagnoses     ICD-10-CM ICD-9-CM   1. Acute hypokalemia E87.6 276.8   2. Lactic acidosis E87.2 276.2   3. Dehydration E86.0 276.51   4.  Dysphagia, unspecified type R13.10 787.20     Past Medical History:   Diagnosis Date    Allergic rhinitis     Angina pectoris (HCC)     Anxiety     Bronchitis     Chronic fatigue syndrome     Chronic pain     COPD (chronic obstructive pulmonary disease) (HCC)     Generalized pain     Headache, tension-type     HTN (hypertension)     Hyperlipidemia     Vitamin D deficiency       Labs:    Lab Results   Component Value Date/Time    Sodium 140 05/14/2017 04:34 AM    Potassium 3.6 05/14/2017 04:34 AM    Chloride 108 05/14/2017 04:34 AM    CO2 21 05/14/2017 04:34 AM    Anion gap 11 05/14/2017 04:34 AM    Glucose 92 05/14/2017 04:34 AM    BUN 5 05/14/2017 04:34 AM    Creatinine 0.47 05/14/2017 04:34 AM    Calcium 7.9 05/14/2017 04:34 AM    Magnesium 2.2 05/09/2017 11:10 PM    Phosphorus 3.3 05/08/2017 09:13 AM    Albumin 2.4 05/08/2017 09:13 AM     Anthropometrics: BMI (calculated): 27.6  Last 3 Recorded Weights in this Encounter    05/06/17 0330 05/06/17 0600 05/15/17 0600   Weight: 68.1 kg (150 lb 2.1 oz) 68.1 kg (150 lb 2.1 oz) 73 kg (161 lb)      Ht Readings from Last 1 Encounters:   05/06/17 5' 4\" (1.626 m)     Patient Vitals for the past 100 hrs:   % Diet Eaten   05/15/17 0900 50 %   05/14/17 0900 50 %     IBW: 120 lb %IBW: 126%    Nutrition Needs:   Calories: 3053-5030 Kcal   Protein:   70-82 g   Fluid Reqrts 1.9 liters/d  Nutrition Problems Identified:   [x] Suboptimal PO intake , requiring assistance with feeding  [x] Difficulty chewing/swallowing    Plan:   [x] Therapeutic Diet  [x] Monitor PO intake on meal rounds   [x]  Continue inpatient monitoring and intervention   [x]  Participated in discharge planning/Interdisciplinary rounds/Team meetings   []  Other:     Nutrition Monitoring and Evaluation:  [x] Continue ongoing monitoring and intervention  [] Other  Nutrition Risk:  [] High  [x] Moderate []  Low  Juanita Hidalgo, RD

## 2017-05-15 NOTE — PROGRESS NOTES
Problem: Dysphagia (Adult)  Goal: *Acute Goals and Plan of Care (Insert Text)    Recommendations:  Diet: mech-soft/thin (NO STRAWS)  Meds: one at a time  Aspiration Precautions  Oral Care TID    Goals: Patient will:  1. Tolerate PO trials with 0 s/s overt distress in 4/5 trials   2. Utilize compensatory swallow strategies/maneuvers (decrease bite/sip, size/rate, alt. liq/sol) with min cues in 4/5 trials   3. Complete an objective swallow study (i.e., MBSS) to assess swallow integrity, r/o aspiration, and determine of safest LRD, min A   SPEECH LANGUAGE PATHOLOGY BEDSIDE SWALLOW EVALUATION     Patient: Pretty Gupta (59 y.o. female)  Date: 5/15/2017  Primary Diagnosis: Hypokalemia  UTI (urinary tract infection)  Dehydration  Encephalopathy  Polycythemia (HCC)  Elevated liver enzymes  Lactic acidosis  Leukocytosis        Precautions: aspiration  Fall      ASSESSMENT :  Based on the objective data described below, the patient presents with mild oropharyngeal dysphagia in the setting of general debility. Pt was seen by ST and d/c'd 5/4/17 for meeting maximum gains, on mech-soft diet. This day, eval rec'd for possible aspiration PNA. Pt accepting thin liquids + straws without overt distress s/sx. With prolonged mastication of mech-soft, notable decrease in O2 saturations. At this time, pt continues safe for mech-soft solid; however, SLP recommending MBS next day to assess swallow integrity and rule-out aspiration. Patient will benefit from skilled intervention to address the above impairments.   Patients rehabilitation potential is considered to be Fair  Factors which may influence rehabilitation potential include:   [ ]            None noted  [X]            Mental ability/status  [ ]            Medical condition  [ ]            Home/family situation and support systems  [ ]            Safety awareness  [ ]            Pain tolerance/management  [ ]            Other:        PLAN :  Recommendations and Planned Interventions:  mech-soft; MBS next day  Frequency/Duration: Patient will be followed by speech-language pathology 1-2 times per day/4-7 days per week to address goals. Discharge Recommendations: Skilled Nursing Facility       SUBJECTIVE:   Patient stated I just need help eating. OBJECTIVE:       Past Medical History:   Diagnosis Date    Allergic rhinitis      Angina pectoris (HCC)      Anxiety      Bronchitis      Chronic fatigue syndrome      Chronic pain      COPD [facility]) [facility]      Generalized pain      Headache, tension-type      HTN (hypertension)      Hyperlipidemia      Hypokalemia      Insomnia      Leg swelling      Lyme disease      Nicotine dependence      OA (osteoarthritis)      RA (rheumatoid arthritis) ([facility])      Vertigo      Vitamin D deficiency       Past Surgical History:   Procedure Laterality Date    HX BUNIONECTOMY   1991     left foot     Prior Level of Function/Home Situation: lives with SO  Home Situation  Home Environment: Private residence  # Steps to Enter: 0  Rails to Enter: No  One/Two Story Residence: One story  Living Alone: No  Support Systems: Spouse/Significant Other/Partner  Patient Expects to be Discharged to[de-identified] Private residence  Current DME Used/Available at Home: None  Tub or Shower Type: Tub/Shower combination  Diet prior to admission: regular  Current Diet:  mech-soft   Cognitive and Communication Status:  Neurologic State: Alert  Orientation Level: Oriented X4  Cognition: Follows commands  Perception: Appears intact  Perseveration: No perseveration noted  Safety/Judgement: Fall prevention  Oral Assessment:  Oral Assessment  Labial: No impairment  Dentition: Poor  Oral Hygiene: Fair  Lingual: No impairment  Velum: No impairment  Mandible: No impairment  P.O. Trials:  Patient Position: [facility]  Vocal quality prior to P.O.: No impairment  Consistency Presented: Thin liquid;Pudding; Solid  How Presented: SLP-fed/presented;Self-fed/presented;Straw;Successive swallows     Bolus Acceptance: No impairment  Bolus Formation/Control: Impaired  Type of Impairment: Delayed;Mastication  Propulsion: Delayed (# of seconds)  Oral Residue: Less than 10% of bolus; Lingual  Initiation of Swallow: Delayed (# of seconds)  Laryngeal Elevation: Decreased  Aspiration Signs/Symptoms: Decrease in O2 saturations  Pharyngeal Phase Characteristics: Poor endurance  Effective Modifications: Small sips and bites  Cues for Modifications: Moderate        Oral Phase Severity: Mild  Pharyngeal Phase Severity : Mild     GCODESwallowing:  Swallow Current Status CK= 40-59%   Swallow Goal Status CI= 1-19%     The severity rating is based on the following outcomes:  PAUL Noms Swallow Level 5              Clinical Judgement     PAIN:  Start of Eval: 0  End of Eval: 0      After treatment:   [ ]            Patient left in no apparent distress sitting up in chair  [X]            Patient left in no apparent distress in bed  [X]            Call bell left within reach  [X]            Nursing notified  [ ]            Family present  [ ]            Caregiver present  [ ]            Bed alarm activated      COMMUNICATION/EDUCATION:   [X]            Aspiration precautions; swallow safety; compensatory techniques. [X]            Patient/family have participated as able in goal setting and plan of care. [X]            Patient/family agree to work toward stated goals and plan of care. [ ]            Patient understands intent and goals of therapy; neutral about participation. [ ]            Patient unable to participate in goal setting/plan of care; educ ongoing with interdisciplinary staff  [ ]             Posted safety precautions in patient's room.      Thank you for this referral.  Kapil Seaman, SLP  Time Calculation: 25 mins

## 2017-05-15 NOTE — PROGRESS NOTES
Assumed care of patient. Patient in bed alert,  at bedside.  Haley removed At 1130, will monitor patient output report received from Duy Boothe

## 2017-05-15 NOTE — PROGRESS NOTES
INFECTIOUS DISEASE FOLLOW UP NOTE :    Admit Date: 4/26/2017    Current abx Prior abx   Doxy 5/9-6 Cefepime 4/28 - 0, Ceftriaxone 4/29 - 1      ASSESSMENT - > REC:      Isolated low grade fever 5/14  - ? 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  - suspect precipitating factor was acute viral pharyngitis (7 days PTA) No recent flu-like illness, diarrheal episodes, no recent vaccinations, no recent travel  - no evidence of pharyngeal inflammation or any other active infection   - axonal variant - per Neurology  - s/p 5 dose IVIG  - Neurologic symptoms persist: Psych feels depression is under fair control and cannot definitively diagnose conversion disorder -> per Neuro, others   Severe Hypokalemia on admission  - due to poor po intake  - corrected - resolved   Abnormal CSF  - elevated protein, normal glucose without leukorrhachia  - c/w GBS - CSF studies not s/o infection    Abnormal LFT's  - has diffuse hepatic steatosis on CT +/- dehydration  - transaminases, alk phos improving - Slightly better   New Otitis media- c/o pain in ears and decreased hearing - On Doxy per IM- recommend dc tomorrow   HTN      COPD     OA/ RA     Hyperlipidemia     ? Chronic Fatigue Syndrome     h/o Lyme Disease  - dx'd ~ 2001 rx'd w/ 2 1-month courses doxycyline 2004 and 2006 and subsequently w/ multiple persistent complaints.  multiple + IgG western blots and negative IgM western blots dating back to 2003 (per Dr. Yefri Darling ID, 2011: no further treatment recommended)  - 5/4/2017 Lyme WB: neg IgG, IgM       MICROBIOLOGY:   4/26 blcx NG x 2          urcx  NG  MRSA scrn neg  5/5 CSF cx ntd     LINES AND CATHETERS:   piv     MEDICATIONS:     Current Facility-Administered Medications   Medication Dose Route Frequency    pseudoephedrine (SUDAFED) tablet 30 mg  30 mg Oral Q8H PRN    dextrose 5% - 0.45% NaCl with KCl 20 mEq/L infusion  100 mL/hr IntraVENous CONTINUOUS    klack's mouthwash oral suspension (COMPOUNDED)  5 mL Oral QID    loratadine (CLARITIN) tablet 10 mg  10 mg Oral DAILY    Thiamine Mononitrate (B-1) tablet 100 mg  100 mg Oral DAILY    gabapentin (NEURONTIN) capsule 600 mg  600 mg Oral TID    senna (SENOKOT) tablet 17.2 mg  2 Tab Oral QHS    oxyCODONE IR (ROXICODONE) tablet 10 mg  10 mg Oral H9G PRN    folic acid (FOLVITE) tablet 1 mg  1 mg Oral DAILY    acetaminophen (TYLENOL) tablet 650 mg  650 mg Oral Q4H PRN    scopolamine (TRANSDERM-SCOP) 1.5 mg  1.5 mg TransDERmal Q72H    QUEtiapine (SEROquel) tablet 50 mg  50 mg Oral QHS    citalopram (CELEXA) tablet 20 mg  20 mg Oral DAILY    benzocaine-menthol (CEPACOL) lozenge 1 Lozenge  1 Lozenge Mucous Membrane PRN    enoxaparin (LOVENOX) injection 40 mg  40 mg SubCUTAneous Q24H    ondansetron (ZOFRAN) injection 4 mg  4 mg IntraVENous Q4H PRN    LORazepam (ATIVAN) tablet 1 mg  1 mg Oral Q6H PRN    oxymetazoline (AFRIN) 0.05 % nasal spray 2 Spray  2 Spray Both Nostrils BID PRN    sodium chloride (NS) flush 5-10 mL  5-10 mL IntraVENous PRN       SUBJECTIVE :     Interval notes reviewed. Low grade fever overnight but afebrile all day today. Continues to complain of leg weakness, burning feeling in fingers of both hands. OBJECTIVE     Visit Vitals    BP (!) 141/98 (BP 1 Location: Left arm, BP Patient Position: At rest)    Pulse (!) 116    Temp 98.7 °F (37.1 °C)    Resp 16    Ht 5' 4\" (1.626 m)    Wt 73 kg (161 lb)    SpO2 99%    BMI 27.64 kg/m2       Temp (24hrs), Av °F (37.2 °C), Min:97.8 °F (36.6 °C), Max:100.5 °F (38.1 °C)    Gen-WDWN WF lying in bed  HENT- No thrush  Chest- Symmetric expansion, CTA  CV-S1S2 RR, No JVD, No edema  Abd-Soft, NT, ND, BS+  Skin-No jaundice, cyanosis, clubbing.  No decubitus  Neuro: awake and oriented, bilateral upper and lower extremity weakness      Labs: Results:   Chemistry Recent Labs      17   0434   GLU  92   NA  140   K  3.6   CL  108   CO2  21 BUN  5*   CREA  0.47*   CA  7.9*   AGAP  11   BUCR  11*      CBC w/Diff Recent Labs      05/14/17   0434   WBC  4.6   RBC  3.13*   HGB  9.7*   HCT  28.5*   PLT  377   GRANS  41   LYMPH  47   EOS  0          RADIOLOGY :          Imaging    Results from Hospital Encounter encounter on 04/26/17   XR CHEST AP LAT   Narrative INDICATION: Shortness of breath and fever. TECHNIQUE: 2 views of the chest.    COMPARISON: 4/26/2017. FINDINGS:  The patient is slightly rotated. Given this limitation, the heart size is  normal. There is new predominantly linear airspace opacity at the left lung  base. No pleural effusion or pneumothorax. Mild degenerative changes of the  spine without acute osseous abnormality. Impression IMPRESSION: Predominantly linear left lower lobe airspace opacity may represent  atelectasis, however developing pneumonia is also possible. Concur with preliminary resident interpretation. Results from [place] encounter on 04/26/17   CT ABD PELV W CONT   Narrative CT OF THE ABDOMEN AND PELVIS, WITH CONTRAST    INDICATION: Upper abdominal pain and abnormal LFTs    COMPARISON: 8/8/2016. TECHNIQUE: Standard helical CT performed through the abdomen and pelvis with IV  contrast.  Coronal and sagittal reformations were generated. One or more dose reduction techniques were used on this CT: automated exposure  control, adjustment of the mAs and/or kVp according to patient's size, and  iterative reconstruction techniques. The specific techniques utilized on this CT  exam have been documented in the patient's electronic medical record.    ============    FINDINGS:    INFERIOR THORAX: Moderate right and mild left dependent atelectasis. No acute  pulmonary infiltrate. Mild global cardiomegaly. No pericardial effusion. LIVER/BILIARY: Diffuse hepatic steatosis. No suspicious liver lesion. No biliary  dilation.  Punctate gallstones with no secondary findings of cholecystitis. SPLEEN: Normal.    PANCREAS: Normal.    ADRENALS: Normal.    KIDNEYS: Exophytic right upper pole renal cyst. No acute or suspicious renal  abnormality. Gevena Alex LYMPH NODES: No mesenteric or retroperitoneal lymphadenopathy. GI TRACT: No wall thickening or dilation. Small hiatal hernia. Normal appendix. Scattered colonic diverticulosis, with no findings of an acute diverticulitis. VASCULATURE: Normal caliber distal thoracic and abdominal aorta. PELVIC ORGANS: Haley catheter is present within the nondistended bladder. Heterogeneously enhancing endometrial complex. BONES: No acute osseous abnormality. Degenerative changes of the thoracic spine. OTHER: No ascites or free intraperitoneal air.    ============         Impression IMPRESSION:    No acute or focal abnormality to explain patient's abdominal pain. There is  diffuse hepatic steatosis. Cholelithiasis. Colonic diverticulosis with no  findings of an acute diverticulitis. Note: Preliminary report sent to the Emergency Department by the radiology  resident at the time of the study.           Armando Gonzalez MD  May 15, 2017    [facility] AT THE [facility]  [phone]

## 2017-05-15 NOTE — ROUTINE PROCESS
Bedside / verbal shift change report given to  [address]   (oncoming nurse) by Kenyon Carpio RN (offgoing nurse). Report included the following information SBAR, Kardex, Intake/Output, MAR and Recent Results.

## 2017-05-15 NOTE — ROUTINE PROCESS
2100:  Rec'd Jane Naik @ 1900 from BRICE Valencia RN. SBAR reviewed with two-nurse check-offs done of meds, dressings, wounds, LDA's & revelant assessment findings. Ms. Jomar Foster is coming from tele unit. TRANSFER - IN REPORT:    Verbal report received from BRICE Valencia (name) on Pretty Gupta  being received from [facility] (unit) for routine progression of care      Report consisted of patients Situation, Background, Assessment and   Recommendations(SBAR). Information from the following report(s) SBAR, Kardex, Procedure Summary, Intake/Output, MAR, Accordion, Recent Results, Med Rec Status and Cardiac Rhythm SR was reviewed with the receiving nurse. Opportunity for questions and clarification was provided. Assessment completed upon patients arrival to unit and care assumed. Overnight:  Wakes to request, oriented, very poor IS. Temp lower. Verbal Patient-side shift change report given to RAJI Schneider, (oncoming nurse) by Reyna Calabrese RN  (offgoing nurse). Report included the following information SBAR, Kardex, ED Summary, Procedure Summary, Intake/Output, MAR, Recent Results and Med Rec Status. Included:  Intro, hx, two-RN eval of relevant assessment findings, LDAs, skin, diagnostics and infusions.

## 2017-05-15 NOTE — PROGRESS NOTES
Hospitalist Progress Note  Connor Arriaga MD  Internal medicine/ Hospitalist    Daily Progress Note: 5/15/2017 10:24 AM      Interval history / Subjective:    56F presented to ED with worsening AMS. Not eating nor drinking for 5 days. PMHx remarkable for COPD, major depression and attempted suicide requiring inpatient py stay 10/2016. CT head in ER wnl. Severe hypokalemia 1.9 and +UA she was admitted and K+ replaced via oral and IV routes, on emperic rocephin urine culture negative. Psych consulted 4/28 but did not have a clear picture of patients picture and was unable to reach boyfriend who is the only known contact, patient has no other family. Celexa recommended and started 4/29. MRI head completed showing only mild atrophy/SVIC. Patient continued to be delusional about her thick saliva, and got tachypnic when anyone came near her. She also stated she was \"not in control of parts of her body. She has been awake alert and oriented. Pt noted with transamnitis on presentation. Hepatitis panel neg. CLAUDIA neg. CRP low normal. She has received 4 days of antibiotics for culture negative UTI, K+ replaced. On 5/4,neurology saw patient and work-up for acute/subacute polyneuropathy process and acute demyelinating polyneuropathy ( Guillain [place] syndrome). Patient has had progressive symmetric ascending motor paralysis over 3-4 weeks. After multiple tests,including nerve conduction study which showed more an axonal process so that suggest axonal type GBS,patient was started on IVIG for 5 days which she has complted. Also was prescribed thiamine,folic acid. She is on gabapentin 600 mg po tid for her painful paresthesias. CSF showed elevated protein with no cells. Elevated CMV IgG but not IgM. HIV test negative. Rehab has been recommended by neurology.         Current Facility-Administered Medications   Medication Dose Route Frequency    pseudoephedrine (SUDAFED) tablet 30 mg  30 mg Oral Q8H PRN    dextrose 5% - 0.45% NaCl with KCl 20 mEq/L infusion  100 mL/hr IntraVENous CONTINUOUS    klack's mouthwash oral suspension (COMPOUNDED)  5 mL Oral QID    loratadine (CLARITIN) tablet 10 mg  10 mg Oral DAILY    Thiamine Mononitrate (B-1) tablet 100 mg  100 mg Oral DAILY    gabapentin (NEURONTIN) capsule 600 mg  600 mg Oral TID    senna (SENOKOT) tablet 17.2 mg  2 Tab Oral QHS    oxyCODONE IR (ROXICODONE) tablet 10 mg  10 mg Oral B9X PRN    folic acid (FOLVITE) tablet 1 mg  1 mg Oral DAILY    acetaminophen (TYLENOL) tablet 650 mg  650 mg Oral Q4H PRN    scopolamine (TRANSDERM-SCOP) 1.5 mg  1.5 mg TransDERmal Q72H    QUEtiapine (SEROquel) tablet 50 mg  50 mg Oral QHS    citalopram (CELEXA) tablet 20 mg  20 mg Oral DAILY    benzocaine-menthol (CEPACOL) lozenge 1 Lozenge  1 Lozenge Mucous Membrane PRN    enoxaparin (LOVENOX) injection 40 mg  40 mg SubCUTAneous Q24H    ondansetron (ZOFRAN) injection 4 mg  4 mg IntraVENous Q4H PRN    LORazepam (ATIVAN) tablet 1 mg  1 mg Oral Q6H PRN    oxymetazoline (AFRIN) 0.05 % nasal spray 2 Spray  2 Spray Both Nostrils BID PRN    sodium chloride (NS) flush 5-10 mL  5-10 mL IntraVENous PRN        Review of Systems  Still saying that she can not grab things with her hand. Objective:     Visit Vitals    BP (!) 138/91    Pulse (!) 108    Temp 98.3 °F (36.8 °C)    Resp 16    Ht 5' 4\" (1.626 m)    Wt 73 kg (161 lb)    SpO2 91%    BMI 27.64 kg/m2      O2 Device: Room air    Temp (24hrs), Av.4 °F (37.4 °C), Min:98.3 °F (36.8 °C), Max:100.5 °F (38.1 °C)     1901 - 05/15 0700  In: 1377 [P.O.:200; I.V.:3985]  Out: 1620 [Urine:1620]  P/E  NAD,awake,alert. Heent:perrla,at/nc,mouth moist.  Lungs catb  Heart s1s2 nl,no m/g  Abdm:soft,not tender,bs present. Extr:no edema,good pedis pulses. Neuro:awake,alert,orx3,strength 4/5 LE and UE. Data Review    No results found for this or any previous visit (from the past 12 hour(s)).       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)      Quadriparesis ([address] (5/13/2017)      Care Plan  1- GBS Axonal Type  Completed IVIG for 5 days   2- UTI, completed treatment   3- Transaminitis Likely 2/2 Fatty Liver  4- COPD, stable   5- Depression w/ Possible Component of Psychosis, on celexa and seroquel   6- Acute Metabolic Encephalopathy, resolved  7- AICHA, resolved  8- otitis media, PCN and levaquin allergy, completed  doxy  9- chronic pain/paresthesia    - thiamine and folate def, on supplement,neurontin  10- allergic rhinitis, add claritin  11- dysphagia, improved with magic mouthwash  SLP to eval   12- transient hypotension 2/2 poor po intake, no sign of sepsis   DVT prophylaxis:on lovenox  Full code  Disposition:waiting for placement.

## 2017-05-15 NOTE — PROGRESS NOTES
Patient transferred to ICU without mouthpiece for VC measurement - RT department has no spares - unable to do FVC at this time

## 2017-05-15 NOTE — PROGRESS NOTES
1225: 1st attempt for OT re-evaluation. Pt transferred to floor. Will follow up.     Jeane Frederick, MS OTR/L  Office Ext: [phone]  Pager: [phone]

## 2017-05-15 NOTE — PROGRESS NOTES
0730  Assumed care of pt. Sleeping, awakens to verbal stimulus to oriented state. Pt c/o poor control of hand and leg movements, also c/o generalized discomfort and pain. 0800  Back care done, pt indicates unwillingness to turn due to discomfort, participates in turning after discussion of consequences and encouragement. Skin intact. Pain med given. 0900  Appetite fairly good for breakfast, pt unable to feed self, assisted by nursing then .    [address] [place] [address] REPORT:    Verbal report given to \"Lucia\" RN(name) on Lyudmila Stephens  being transferred to [facility] (unit) for routine progression of care       Report consisted of patients Situation, Background, Assessment and   Recommendations(SBAR). Information from the following report(s) SBAR, Kardex, Intake/Output, MAR and Accordion was reviewed with the receiving nurse. Lines:   Peripheral IV 05/10/17 Left Forearm (Active)   Site Assessment Clean, dry, & intact 5/15/2017  8:00 AM   Phlebitis Assessment 0 5/15/2017  8:00 AM   Infiltration Assessment 0 5/15/2017  8:00 AM   Dressing Status Clean, dry, & intact 5/15/2017  8:00 AM   Dressing Type Transparent 5/15/2017  8:00 AM   Hub Color/Line Status Blue; Infusing 5/15/2017  8:00 AM   Action Taken Open ports on tubing capped 5/15/2017 12:00 AM   Alcohol Cap Used Yes 5/15/2017  8:00 AM        Opportunity for questions and clarification was provided.       Patient transported with:   Patient-specific medications from Pharmacy

## 2017-05-16 ENCOUNTER — APPOINTMENT (OUTPATIENT)
Dept: GENERAL RADIOLOGY | Age: 57
DRG: 049 | End: 2017-05-16
Attending: INTERNAL MEDICINE
Payer: MEDICAID

## 2017-05-16 PROCEDURE — 97168 OT RE-EVAL EST PLAN CARE: CPT

## 2017-05-16 PROCEDURE — 74011250637 HC RX REV CODE- 250/637: Performed by: INTERNAL MEDICINE

## 2017-05-16 PROCEDURE — 74011250637 HC RX REV CODE- 250/637: Performed by: HOSPITALIST

## 2017-05-16 PROCEDURE — 65660000000 HC RM CCU STEPDOWN

## 2017-05-16 PROCEDURE — 92611 MOTION FLUOROSCOPY/SWALLOW: CPT

## 2017-05-16 PROCEDURE — 74011250637 HC RX REV CODE- 250/637: Performed by: PSYCHIATRY & NEUROLOGY

## 2017-05-16 PROCEDURE — 74230 X-RAY XM SWLNG FUNCJ C+: CPT

## 2017-05-16 PROCEDURE — 74011000255 HC RX REV CODE- 255: Performed by: INTERNAL MEDICINE

## 2017-05-16 PROCEDURE — 74011250636 HC RX REV CODE- 250/636: Performed by: FAMILY MEDICINE

## 2017-05-16 PROCEDURE — 74011250636 HC RX REV CODE- 250/636: Performed by: INTERNAL MEDICINE

## 2017-05-16 RX ADMIN — BARIUM SULFATE 700 MG: 700 TABLET ORAL at 09:49

## 2017-05-16 RX ADMIN — ENOXAPARIN SODIUM 40 MG: 40 INJECTION SUBCUTANEOUS at 05:09

## 2017-05-16 RX ADMIN — FOLIC ACID 1 MG: 1 TABLET ORAL at 09:01

## 2017-05-16 RX ADMIN — DEXTROSE MONOHYDRATE, SODIUM CHLORIDE, AND POTASSIUM CHLORIDE 100 ML/HR: 50; 4.5; 1.49 INJECTION, SOLUTION INTRAVENOUS at 05:09

## 2017-05-16 RX ADMIN — DEXTROSE MONOHYDRATE, SODIUM CHLORIDE, AND POTASSIUM CHLORIDE 100 ML/HR: 50; 4.5; 1.49 INJECTION, SOLUTION INTRAVENOUS at 23:22

## 2017-05-16 RX ADMIN — HYDROCORTISONE 5 ML: 10 TABLET ORAL at 14:00

## 2017-05-16 RX ADMIN — Medication 100 MG: at 09:01

## 2017-05-16 RX ADMIN — HYDROCORTISONE 5 ML: 10 TABLET ORAL at 23:17

## 2017-05-16 RX ADMIN — OXYCODONE HYDROCHLORIDE 10 MG: 5 TABLET ORAL at 11:09

## 2017-05-16 RX ADMIN — BARIUM SULFATE 15 ML: 400 PASTE ORAL at 09:49

## 2017-05-16 RX ADMIN — GABAPENTIN 600 MG: 300 CAPSULE ORAL at 11:09

## 2017-05-16 RX ADMIN — OXYCODONE HYDROCHLORIDE 10 MG: 5 TABLET ORAL at 23:32

## 2017-05-16 RX ADMIN — OXYCODONE HYDROCHLORIDE 10 MG: 5 TABLET ORAL at 05:10

## 2017-05-16 RX ADMIN — GABAPENTIN 600 MG: 300 CAPSULE ORAL at 17:22

## 2017-05-16 RX ADMIN — HYDROCORTISONE 5 ML: 10 TABLET ORAL at 17:21

## 2017-05-16 RX ADMIN — BARIUM SULFATE 30 ML: 0.81 POWDER, FOR SUSPENSION ORAL at 09:48

## 2017-05-16 RX ADMIN — QUETIAPINE FUMARATE 50 MG: 25 TABLET, FILM COATED ORAL at 23:16

## 2017-05-16 RX ADMIN — CITALOPRAM HYDROBROMIDE 20 MG: 20 TABLET ORAL at 09:01

## 2017-05-16 RX ADMIN — HYDROCORTISONE 5 ML: 10 TABLET ORAL at 09:01

## 2017-05-16 RX ADMIN — OXYCODONE HYDROCHLORIDE 10 MG: 5 TABLET ORAL at 16:33

## 2017-05-16 RX ADMIN — GABAPENTIN 600 MG: 300 CAPSULE ORAL at 05:10

## 2017-05-16 RX ADMIN — LORAZEPAM 1 MG: 1 TABLET ORAL at 17:33

## 2017-05-16 RX ADMIN — LORATADINE 10 MG: 10 TABLET ORAL at 09:01

## 2017-05-16 NOTE — PROGRESS NOTES
Problem: Dysphagia (Adult)  Goal: *Acute Goals and Plan of Care (Insert Text)    Recommendations:  Diet: mech-soft/thin   Meds: one at a time  Aspiration Precautions  Oral Care TID    Goals: Patient will:  1. Tolerate PO trials with 0 s/s overt distress in 4/5 trials - goal met  2. Utilize compensatory swallow strategies/maneuvers (decrease bite/sip, size/rate, alt. liq/sol) with min cues in 4/5 trials - goal met  3. Complete an objective swallow study (i.e., MBSS) to assess swallow integrity, r/o aspiration, and determine of safest LRD, min A - goal met  Outcome: Resolved/Met Date Met:  05/16/17  SPEECH PATHOLOGY MODIFIED BARIUM SWALLOW STUDY/DISCHARGE     Patient: Ami Anderson (28 y.o. female)  Date: 5/16/2017  Primary Diagnosis: Hypokalemia  UTI (urinary tract infection)  Dehydration  Encephalopathy  Polycythemia (HCC)  Elevated liver enzymes  Lactic acidosis  Leukocytosis        Precautions: aspiration  Fall      ASSESSMENT :  Modified Barium Swallow completed with 0 aspiration evident across all study trials, to include: nectar-thick and thin liquids; pudding; cracker; and 13 mm barium pill with thin liquid wash. Pt demo mildly delayed bolus cohesion, manipulation, and propulsion secondary to poor dentition. Noted mildly delayed swallow reflex of solids, but (+) hyolaryngeal excursion. Pt presents with minimal oropharyngeal dysphagia, as evidenced above, which places pt at risk for aspiration. At this time, safest for mech-soft solid, thin liquid diet. SLP utilized video of study for visual feedback, education, and recommendations for pt; verbalized comprehension. Maximum therapeutic gains met; safest, least restrictive diet achieved in current in-patient/acute setting. Accordingly, SLP to d/c intervention at this time. PLAN :  Recommendations and Planned Interventions:  Diet:mech-soft  0 formal ST needs ID'd at time of evaluation.   Discharge Recommendations: SNF       SUBJECTIVE: Patient stated I can swallow fine; I just don't have many teeth. .      OBJECTIVE:       Past Medical History:   Diagnosis Date    Allergic rhinitis      Angina pectoris (HCC)      Anxiety      Bronchitis      Chronic fatigue syndrome      Chronic pain      COPD (chronic obstructive pulmonary disease) (HCC)      Generalized pain      Headache, tension-type      HTN (hypertension)      Hyperlipidemia      Hypokalemia      Insomnia      Leg swelling      Lyme disease      Nicotine dependence      OA (osteoarthritis)      RA (rheumatoid arthritis) ([facility])      Vertigo      Vitamin D deficiency       Past Surgical History:   Procedure Laterality Date    HX BUNIONECTOMY   1991     left foot     Prior Level of Function/Home Situation: lives with SO  Home Situation  Home Environment: Private residence  # Steps to Enter: 0  Rails to Enter: No  One/Two Story Residence: One story  Living Alone: No  Support Systems: Spouse/Significant Other/Partner  Patient Expects to be Discharged to[de-identified] Private residence  Current DME Used/Available at Home: None  Tub or Shower Type: Tub/Shower combination  Diet prior to admission: regular  Current Diet:  mech-soft   Radiologist: FACUNDO [facility] Views: Lateral  Patient Position: 90      Trial 1: Trial 2:   Consistency Presented: Thin liquid Consistency Presented: Pudding; Solid   How Presented: SLP-fed/presented How Presented: SLP-fed/presented         Bolus Acceptance: No impairment Bolus Acceptance: No impairment   Bolus Formation/Control: No impairment:   Bolus Formation/Control: Impaired: Delayed;Mastication;Premature spillage   Propulsion: No impairment Propulsion: Delayed (# of seconds)   Oral Residue: None Oral Residue: None   Initiation of Swallow: No impairment Initiation of Swallow: Triggered at valleculae   Timing: No impairment Timing: Pooling 1-5 sec   Penetration: None Penetration: None   Aspiration/Timing: No evidence of aspiration Aspiration/Timing: No evidence of aspiration   Pharyngeal Clearance: No residue Pharyngeal Clearance: No residue               Cues for Modifications: None Cues for Modifications: None             Trial 3: Trial 4:   Consistency Presented: Pill/Tablet; Thin liquid     How Presented: SLP-fed/presented;Straw;Successive swallows           Bolus Acceptance: No impairment     Bolus Formation/Control: No impairment, issues, or problems :    :     Propulsion: No impairment     Oral Residue: None     Initiation of Swallow: Triggered at base of tongue     Timing: No impairment     Penetration: None     Aspiration/Timing: No evidence of aspiration     Pharyngeal Clearance: No residue                 Cues for Modifications: None              Decreased Tongue Base Retraction?: No  Laryngeal Elevation: WFL (within functional limits)  Aspiration/Penetration Score: 1 (No penetration or aspiration-Contrast does not enter the airway)  Pharyngeal Symmetry: Not assessed  Pharyngeal-Esophageal Segment: No impairment  Pharyngeal Dysfunction: None     Oral Phase Severity: Mild  Pharyngeal Phase Severity: Minimal     PAIN:  Start of Study: 0  End of Study: 0          COMMUNICATION/EDUCATION:   [X]   MBS results    [X]   Patient/family have participated as able & agree with findings/recommendations. [ ]   Patient is unable to participate in plan of care at this time.      Thank you for this referral.  ROMY Madera  Time Calculation: 30 mins

## 2017-05-16 NOTE — PROGRESS NOTES
Assumed care of patient, patient resting, bed in lowest position. Call bell in reach. Patient is requesting a abraham be placed, explain the risk factors of having a abraham. Patient is voiding , with inn continence.  Sbar report received from Nawaf Ballard 19   Patient stated she was agitated, ativan prn given

## 2017-05-16 NOTE — PROGRESS NOTES
Assumed pt care from [facility]. Received pt resting in bed, no s/s of distress. Call bell within reach.

## 2017-05-16 NOTE — PROGRESS NOTES
1925: Bedside verbal shift report received from Zaina Adame RN (preceptor). Pt is awake in bed, no signs of distress, instructed to press call light if assistance is needed, call light within reach. 2021: Pt is voiding but incontinent, bladder scan was performed and revealed 228ml of urine. Pt is requesting for a abraham catheter so she \"doesn't have to wet the bed\", and complaining of not being able to control when she needs to go. It was explained to the pt the infection risks that comes with the abraham catheter, but pt still wants it. 0730: Bedside and Verbal shift change report given to [place], RN (oncoming nurse) by Sowmya Maurer RN (offgoing nurse) and Zaina Aadme RN. Report included the following information SBAR, Kardex, Intake/Output, MAR and Recent Results.

## 2017-05-16 NOTE — PROGRESS NOTES
1011: 1st attempt for OT re-evaluation. Pt arriving back to room with transport. Will follow up. Thank you.     Papo Pelaez MS OTR/L  Office Ext: [phone]  Pager: [phone]

## 2017-05-16 NOTE — PROGRESS NOTES
Hospitalist Progress Note  Hui Perdomo MD  Internal medicine/ Hospitalist    Daily Progress Note: 5/16/2017 10:24 AM      Interval history / Subjective:    56F presented to ED with worsening AMS. Not eating nor drinking for 5 days. PMHx remarkable for COPD, major depression and attempted suicide requiring inpatient pych stay 10/2016. CT head in ER wnl. Severe hypokalemia 1.9 and +UA she was admitted and K+ replaced via oral and IV routes, on emperic rocephin urine culture negative. Psych consulted 4/28 but did not have a clear picture of patients picture and was unable to reach boyfriend who is the only known contact, patient has no other family. Celexa recommended and started 4/29. MRI head completed showing only mild atrophy/SVIC. Patient continued to be delusional about her thick saliva, and got tachypnic when anyone came near her. She also stated she was \"not in control of parts of her body. She has been awake alert and oriented. Pt noted with transamnitis on presentation. Hepatitis panel neg. CLAUDIA neg. CRP low normal. She has received 4 days of antibiotics for culture negative UTI, K+ replaced. 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 Patient has had progressive symmetric ascending motor paralysis over 3-4 weeks. After multiple tests,including nerve conduction study which showed more an axonal process so that suggest axonal type GBS,patient was started on IVIG for 5 days which she has complted. Also was prescribed thiamine,folic acid. She is on gabapentin 600 mg po tid for her painful paresthesias. CSF showed elevated protein with no cells. Elevated CMV IgG but not IgM. HIV test negative. Rehab has been recommended by neurology.         Current Facility-Administered Medications   Medication Dose Route Frequency    pseudoephedrine (SUDAFED) tablet 30 mg  30 mg Oral Q8H PRN    dextrose 5% - 0.45% NaCl with KCl 20 mEq/L infusion  100 mL/hr IntraVENous CONTINUOUS    klack's mouthwash oral suspension (COMPOUNDED)  5 mL Oral QID    loratadine (CLARITIN) tablet 10 mg  10 mg Oral DAILY    Thiamine Mononitrate (B-1) tablet 100 mg  100 mg Oral DAILY    gabapentin (NEURONTIN) capsule 600 mg  600 mg Oral TID    senna (SENOKOT) tablet 17.2 mg  2 Tab Oral QHS    oxyCODONE IR (ROXICODONE) tablet 10 mg  10 mg Oral W9R PRN    folic acid (FOLVITE) tablet 1 mg  1 mg Oral DAILY    acetaminophen (TYLENOL) tablet 650 mg  650 mg Oral Q4H PRN    scopolamine (TRANSDERM-SCOP) 1.5 mg  1.5 mg TransDERmal Q72H    QUEtiapine (SEROquel) tablet 50 mg  50 mg Oral QHS    citalopram (CELEXA) tablet 20 mg  20 mg Oral DAILY    benzocaine-menthol (CEPACOL) lozenge 1 Lozenge  1 Lozenge Mucous Membrane PRN    enoxaparin (LOVENOX) injection 40 mg  40 mg SubCUTAneous Q24H    ondansetron (ZOFRAN) injection 4 mg  4 mg IntraVENous Q4H PRN    LORazepam (ATIVAN) tablet 1 mg  1 mg Oral Q6H PRN    oxymetazoline (AFRIN) 0.05 % nasal spray 2 Spray  2 Spray Both Nostrils BID PRN    sodium chloride (NS) flush 5-10 mL  5-10 mL IntraVENous PRN        Review of Systems  No change,still some weakness of hands. Objective:     Visit Vitals    /76 (BP 1 Location: Left arm, BP Patient Position: At rest)    Pulse (!) 112    Temp 98.4 °F (36.9 °C)    Resp 18    Ht 5' 4\" (1.626 m)    Wt 73 kg (161 lb)    SpO2 95%    BMI 27.64 kg/m2      O2 Device: Room air    Temp (24hrs), Av °F (37.2 °C), Min:98.4 °F (36.9 °C), Max:99.6 °F (37.6 °C)     1901 - 05/15 0700  In: 7623 [P.O.:200; I.V.:3985]  Out: 1620 [Urine:1620]  P/E  NAD,awake,alert. Heent:perrla,at/nc,mouth moist.  Lungs catb  Heart s1s2 nl,no m/g  Abdm:soft,not tender,bs present. Extr:no edema,good pedis pulses. Neuro:awake,alert,orx3,strength 4/5 LE and UE. Data Review    No results found for this or any previous visit (from the past 12 hour(s)).       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)      Quadriparesis ([address] (5/13/2017)      Care Plan  1- GBS Axonal Type  Completed IVIG for 5 days   2- UTI, completed treatment   3- Transaminitis Likely 2/2 Fatty Liver  4- COPD, stable   5- Depression w/ Possible Component of Psychosis, on celexa and seroquel   6- Acute Metabolic Encephalopathy, resolved  7- AICHA, resolved  8- otitis media, PCN and levaquin allergy, completed  doxy  9- chronic pain/paresthesia    - thiamine and folate def, on supplement,neurontin  10- allergic rhinitis, add claritin  11- dysphagia, improved with magic mouthwash  SLP to eval   12- transient hypotension 2/2 poor po intake, no sign of sepsis   DVT prophylaxis:on lovenox  Full code  Disposition:waiting for placement.

## 2017-05-16 NOTE — PROGRESS NOTES
INFECTIOUS DISEASE FOLLOW UP NOTE :    Admit Date: 4/26/2017    Current abx Prior abx   Off abx 5/13-3 Cefepime 4/28 - 0, Ceftriaxone 4/29 - 1 doxy 5/9-4      ASSESSMENT - > REC:      Isolated low grade fever 5/14  - ? Etiology, lower now; no central line, not toxic appearing  -CXR s/o atelectasis, bctx's ngtd x 2, last wbc not elevated, denied cough diarrhea or dysuria ->pul toilet  ->monitor cultures - no  sx to suggest uti  ->pul toilet per IM   Guilain [place] syndrome  - suspect precipitating factor was acute viral pharyngitis (7 days PTA) No recent flu-like illness, diarrheal episodes, no recent vaccinations, no recent travel  - no evidence of pharyngeal inflammation or any other active infection   - axonal variant - per Neurology  - s/p 5 dose IVIG  - Neurologic symptoms persist: Psych feels depression is under fair control and cannot definitively diagnose conversion disorder -> per Neuro, others   Severe Hypokalemia on admission  - due to poor po intake  - corrected - resolved   Abnormal CSF  - elevated protein, normal glucose without leukorrhachia  - c/w GBS - CSF studies not s/o infection    Abnormal LFT's  - has diffuse hepatic steatosis on CT +/- dehydration  - transaminases, alk phos improving - Slightly better   Otitis media- c/o pain in ears and decreased hearing - was on Doxy per IM, now off abx   HTN      COPD     OA/ RA     Hyperlipidemia     ? Chronic Fatigue Syndrome     h/o Lyme Disease  - dx'd ~ 2001 rx'd w/ 2 1-month courses doxycyline 2004 and 2006 and subsequently w/ multiple persistent complaints.  multiple + IgG western blots and negative IgM western blots dating back to 2003 (per Dr. Isreal Damian - [place] [facility], 2011: no further treatment recommended)  - 5/4/2017 Lyme WB: neg IgG, IgM       MICROBIOLOGY:   4/26 blcx NG x 2          urcx  NG  MRSA scrn neg  5/5 CSF cx ng  5/14 bctx x 2 ngtd   uctx ngtd     LINES AND CATHETERS: piv     MEDICATIONS:     Current Facility-Administered Medications   Medication Dose Route Frequency    pseudoephedrine (SUDAFED) tablet 30 mg  30 mg Oral Q8H PRN    dextrose 5% - 0.45% NaCl with KCl 20 mEq/L infusion  100 mL/hr IntraVENous CONTINUOUS    klack's mouthwash oral suspension (COMPOUNDED)  5 mL Oral QID    loratadine (CLARITIN) tablet 10 mg  10 mg Oral DAILY    Thiamine Mononitrate (B-1) tablet 100 mg  100 mg Oral DAILY    gabapentin (NEURONTIN) capsule 600 mg  600 mg Oral TID    senna (SENOKOT) tablet 17.2 mg  2 Tab Oral QHS    oxyCODONE IR (ROXICODONE) tablet 10 mg  10 mg Oral L7V PRN    folic acid (FOLVITE) tablet 1 mg  1 mg Oral DAILY    acetaminophen (TYLENOL) tablet 650 mg  650 mg Oral Q4H PRN    scopolamine (TRANSDERM-SCOP) 1.5 mg  1.5 mg TransDERmal Q72H    QUEtiapine (SEROquel) tablet 50 mg  50 mg Oral QHS    citalopram (CELEXA) tablet 20 mg  20 mg Oral DAILY    benzocaine-menthol (CEPACOL) lozenge 1 Lozenge  1 Lozenge Mucous Membrane PRN    enoxaparin (LOVENOX) injection 40 mg  40 mg SubCUTAneous Q24H    ondansetron (ZOFRAN) injection 4 mg  4 mg IntraVENous Q4H PRN    LORazepam (ATIVAN) tablet 1 mg  1 mg Oral Q6H PRN    oxymetazoline (AFRIN) 0.05 % nasal spray 2 Spray  2 Spray Both Nostrils BID PRN    sodium chloride (NS) flush 5-10 mL  5-10 mL IntraVENous PRN       SUBJECTIVE :     Interval notes reviewed. Had mild T elevation 5/14, lower since, CXR s/o atelect vs pneumonia, pt denied cp cough or sob or chills, denied n/v/d or burning micturition.  Says iv site recently changed from R to LUE as R was uncomfortable     OBJECTIVE     Visit Vitals    /76 (BP 1 Location: Left arm, BP Patient Position: At rest)    Pulse (!) 112    Temp 98.4 °F (36.9 °C)    Resp 18    Ht 5' 4\" (1.626 m)    Wt 73 kg (161 lb)    SpO2 95%    BMI 27.64 kg/m2       Temp (24hrs), Av °F (37.2 °C), Min:98.4 °F (36.9 °C), Max:99.6 °F (37.6 °C)    Gen-WDWN WF lying in bed asleep easily roused  HENT- No thrush mouth tacky  Chest- Symmetric expansion, no rales rhonchi or wheeze  CV- mild tachycardia, no murmur or gallop  Abd-Soft, NT, ND, BS+  EXT: no edema R wrist ecchymosis piv LUE  Neuro: awake and oriented, bilateral upper and lower extremity weakness      Labs: Results:   Chemistry Recent Labs      05/14/17   0434   GLU  92   NA  140   K  3.6   CL  108   CO2  21   BUN  5*   CREA  0.47*   CA  7.9*   AGAP  11   BUCR  11*      CBC w/Diff Recent Labs      05/14/17   0434   WBC  4.6   RBC  3.13*   HGB  9.7*   HCT  28.5*   PLT  377   GRANS  41   LYMPH  47   EOS  0        MBS 5/16 FINDINGS:     -Thin: No findings of laryngeal penetration or aspiration.     -Pudding consistency : Swallowing delay with premature spillage. No reported  aspiration or penetration.     -Solid consistency: Swallowing delay with premature spillage. No findings of  laryngeal penetration or aspiration.     -13 mm barium pill with thin barium: No findings of aspiration or penetration. 5/14 cxr IMPRESSION: Predominantly linear left lower lobe airspace opacity may represent  atelectasis, however developing pneumonia is also possible    RAJI Montemayor MD  May 16, 2017    [facility]  [phone]

## 2017-05-16 NOTE — PROGRESS NOTES
Problem: Self Care Deficits Care Plan (Adult)  Goal: *Acute Goals and Plan of Care (Insert Text)  Occupational Therapy Goals  Initiated 4/29/2017; re-evaluated on 5/16/2017 within 7 day(s). Continue with all goals. 1. Patient will perform self-feeding with minimal assistance/contact guard assist, adaptive strategies prn.   2. Patient will perform grooming with minimal assistance/contact guard assist.  3. Patient will perform upper body dressing with minimal assistance/contact guard assist.  4. Patient will perform functional task for 3 minutes while seated on EOB with mod assist for balance. 5. Patient will participate in upper extremity therapeutic exercise/activities with minimal assistance/contact guard assist for 8 minutes to increase strength/endurance for ADLs. 6. Patient will perform functional task with minimal assistance utilizing adaptive strategies, prn, to maintain function for ADLs. Outcome: Progressing Towards Goal  OCCUPATIONAL THERAPY REEVALUATION     Patient: Shandra Holden (64 y.o. female)  Date: 5/16/2017  Diagnosis: Hypokalemia  UTI (urinary tract infection)  Dehydration  Encephalopathy  Polycythemia (HCC)  Elevated liver enzymes  Lactic acidosis  Leukocytosis Encephalopathy       Precautions: Fall      ASSESSMENT :  Based on the objective data described below, the patient presents with continued impairments with regard to BUE function/strength and overall independence in ADLs secondary to weakness of BUEs. Pt required max encouragement to participate in session; reports 10/10 pain in both hands (\"pins and needles\"). Pt demo's full wrist flex/extension, pronation/supination, and elbow flexion with increased time. Unable to achieve active shoulder flexion of BUEs. Max A for rolling; pt declined to maneuver to EOB for grooming task despite maximum encouragement. Pt educated on importance of mobility; discussed the plan to maneuver to EOB next session; pt verbalized understanding.  Pt left supine with needs within reach; BUEs positioned on pillows; c/o 10/10 pain. Patient will benefit from skilled intervention to address the above impairments. Patients rehabilitation potential is considered to be Good  Factors which may influence rehabilitation potential include:   [ ]                None noted  [ ]                Mental ability/status  [X]                Medical condition  [ ]                Home/family situation and support systems  [ ]                Safety awareness  [ ]                Pain tolerance/management  [ ]                Other:           PLAN :  Recommendations and Planned Interventions:  [X]                  Self Care Training                  [X]           Therapeutic Activities  [X]                  Functional Mobility Training    [ ]           Cognitive Retraining  [X]                  Therapeutic Exercises           [X]           Endurance Activities  [X]                  Balance Training                   [ ]           Neuromuscular Re-Education  [ ]                  Visual/Perceptual Training     [X]      Home Safety Training  [X]                  Patient Education                 [X]           Family Training/Education  [ ]                  Other (comment):     Frequency/Duration: Patient will be followed by occupational therapy 3 times a week to address goals. Discharge Recommendations: Rehab  Further Equipment Recommendations for Discharge: bedside commode       SUBJECTIVE:   Patient stated *It's like pins and needles in my fingers. \"      OBJECTIVE DATA SUMMARY:   Hospital course since last seen and reason for reevaluation: It has been one week since initial evaluation. Pt continues to require maximum assistance for ADLs secondary to weakness of BUEs. Pt has continued to decline to maneuver to EOB in preparation for functional tasks; will plan for bed mobility/EOB activities for upcoming week of therapy.      G CODES:  Self Care  Current  CL= 60-79%  [id number] Goal  CK= 40-59%. The severity rating is based on the Other Functional Assessment, MMT, ROM      Eval Complexity: History: MEDIUM Complexity : Expanded review of history including physical, cognitive and psychosocial  history ; Examination: MEDIUM Complexity : 3-5 performance deficits relating to physical, cognitive , or psychosocial skils that result in activity limitations and / or participation restrictions; Decision Making:MEDIUM Complexity : Patient may present with comorbidities that affect occupational performnce. Miniml to moderate modification of tasks or assistance (eg, physical or verbal ) with assesment(s) is necessary to enable patient to complete evaluation      Cognitive/Behavioral Status:  Neurologic State: Alert  Orientation Level: Oriented X4  Cognition: Follows commands  Safety/Judgement: Fall prevention      Skin: Intact (BUEs)  Edema: None noted (BUEs)     Vision/Perceptual:    Acuity: Within Defined Limits       Coordination:  Coordination: Generally decreased, functional (BUEs)  Fine Motor Skills-Upper: Right Impaired;Left Impaired    Gross Motor Skills-Upper: Right Intact; Left Intact      Balance:  Sitting:  (not assessed; pt refused)     Strength:  Strength: Generally decreased, functional (BUEs: 1/5 shoulder flex; 3/5 elbow flex; 3/5 wrist flex/ext)     Tone & Sensation:  Tone: Normal (BUEs)  Sensation: Intact (BUEs)     Range of Motion:  AROM: Generally decreased, functional (BUEs:no active shoulder flexion; full elbow & wrist flex/ext)  PROM: Within functional limits (BUEs)     Functional Mobility and Transfers for ADLs:  Bed Mobility:  Rolling: Maximum assistance  Supine to Sit:  (not assessed; pt refused)     Transfers:  Sit to Stand:  (not assessed; pt refused)     ADL Assessment:  Feeding: Maximum assistance  Oral Facial Hygiene/Grooming: Maximum assistance  Bathing: Maximum assistance  Upper Body Dressing: Maximum assistance  Lower Body Dressing:  Total assistance  Toileting: Maximum assistance     Cognitive Retraining  Safety/Judgement: Fall prevention     Pain:  Pre treatment pain level: 10/10  Post treatment pain level: 10/10  Pain Scale 1: Visual  Pain Intensity 1: 0  Pain Location 1: Generalized  Pain Orientation 1: Left;Right  Pain Description 1: Aching;Burning; Other (comment) (\"pins and needles\")     Activity Tolerance:  Poor+  Please refer to the flowsheet for vital signs taken during this treatment. After treatment:   [ ] Patient left in no apparent distress sitting up in chair  [X] Patient left in no apparent distress in bed  [X] Call bell left within reach  [X] Nursing notified  [ ] Caregiver present  [ ] Bed alarm activated      COMMUNICATION/EDUCATION: Patient educated on role of OT and continued POC. She verbalized understanding.   [X]    Home safety education was provided and the patient/caregiver indicated understanding. [X]    Patient/family have participated as able in goal setting and plan of care. [X]    Patient/family agree to work toward stated goals and plan of care. [ ]    Patient understands intent and goals of therapy, but is neutral about his/her participation. [ ]    Patient is unable to participate in goal setting and plan of care. This patients plan of care is appropriate for delegation to [facility].      Thank you for this referral.     Heather Pablo MS OTR/L  Time Calculation: 15 mins

## 2017-05-17 LAB
Lab: NORMAL
REFERENCE LAB,REFLB: NORMAL
TEST DESCRIPTION:,ATST: NORMAL

## 2017-05-17 PROCEDURE — 74011250637 HC RX REV CODE- 250/637: Performed by: PSYCHIATRY & NEUROLOGY

## 2017-05-17 PROCEDURE — 77030033263 HC DRSG MEPILEX 16-48IN BORD MOLN -B

## 2017-05-17 PROCEDURE — 74011250637 HC RX REV CODE- 250/637: Performed by: HOSPITALIST

## 2017-05-17 PROCEDURE — 65660000000 HC RM CCU STEPDOWN

## 2017-05-17 PROCEDURE — 74011250637 HC RX REV CODE- 250/637: Performed by: INTERNAL MEDICINE

## 2017-05-17 PROCEDURE — 74011250636 HC RX REV CODE- 250/636: Performed by: INTERNAL MEDICINE

## 2017-05-17 PROCEDURE — 74011250636 HC RX REV CODE- 250/636: Performed by: FAMILY MEDICINE

## 2017-05-17 RX ADMIN — FOLIC ACID 1 MG: 1 TABLET ORAL at 09:34

## 2017-05-17 RX ADMIN — OXYCODONE HYDROCHLORIDE 10 MG: 5 TABLET ORAL at 05:14

## 2017-05-17 RX ADMIN — QUETIAPINE FUMARATE 50 MG: 25 TABLET, FILM COATED ORAL at 21:49

## 2017-05-17 RX ADMIN — ENOXAPARIN SODIUM 40 MG: 40 INJECTION SUBCUTANEOUS at 05:08

## 2017-05-17 RX ADMIN — HYDROCORTISONE 5 ML: 10 TABLET ORAL at 18:32

## 2017-05-17 RX ADMIN — GABAPENTIN 600 MG: 300 CAPSULE ORAL at 05:07

## 2017-05-17 RX ADMIN — OXYCODONE HYDROCHLORIDE 10 MG: 5 TABLET ORAL at 09:33

## 2017-05-17 RX ADMIN — DEXTROSE MONOHYDRATE, SODIUM CHLORIDE, AND POTASSIUM CHLORIDE 100 ML/HR: 50; 4.5; 1.49 INJECTION, SOLUTION INTRAVENOUS at 09:54

## 2017-05-17 RX ADMIN — LORAZEPAM 1 MG: 1 TABLET ORAL at 23:57

## 2017-05-17 RX ADMIN — CITALOPRAM HYDROBROMIDE 20 MG: 20 TABLET ORAL at 09:33

## 2017-05-17 RX ADMIN — HYDROCORTISONE 5 ML: 10 TABLET ORAL at 23:42

## 2017-05-17 RX ADMIN — Medication 100 MG: at 09:34

## 2017-05-17 RX ADMIN — GABAPENTIN 600 MG: 300 CAPSULE ORAL at 11:47

## 2017-05-17 RX ADMIN — LORATADINE 10 MG: 10 TABLET ORAL at 09:34

## 2017-05-17 RX ADMIN — HYDROCORTISONE 5 ML: 10 TABLET ORAL at 09:00

## 2017-05-17 RX ADMIN — OXYCODONE HYDROCHLORIDE 10 MG: 5 TABLET ORAL at 22:17

## 2017-05-17 RX ADMIN — GABAPENTIN 600 MG: 300 CAPSULE ORAL at 18:33

## 2017-05-17 NOTE — PROGRESS NOTES
NUTRITION follow-up/Plan of care    RECOMMENDATIONS:   1. Dental Soft  2. Monitor weight and PO intake  3. RD to follow    GOALS:   1. Ongoing: PO intake meets >75% of protein/calorie needs by  5/22  2. Ongoing: Maintain weight (+/- 1-2 lb) by 5/22    ASSESSMENT:    Per BMI of 27.6, pt is overweight. PO intake is improving, now 50%+. Nutrition recommendations listed. RD to follow. Nutrition Risk:  []   High [x]  Moderate [] Low    SUBJECTIVE/OBJECTIVE:   PO intake has improved, now at 50 %+. Requires feeding by family  or staff, hands remain weak, states \"it feels like being pricked with pins & needles \". OT continues to work with pt. Per pt to receive additional PT & OT at an [facility] rehab. Information Obtained From:   [x] Chart Review  [x] Patient  [x] Family/Caregiver  [] Nurse/Physician   [] Patient Rounds/Interdisciplinary Meeting    Diet: Dental Soft  Patient Vitals for the past 100 hrs:   % Diet Eaten   05/17/17 0952 50 %   05/16/17 2230 90 %   05/15/17 0900 50 %   05/14/17 0900 50 %     Medications: [x] Reviewed   Encounter Diagnoses     ICD-10-CM ICD-9-CM   1. Acute hypokalemia E87.6 276.8   2. Lactic acidosis E87.2 276.2   3. Dehydration E86.0 276.51   4.  Dysphagia, unspecified type R13.10 787.20     Past Medical History:   Diagnosis Date    Allergic rhinitis     Angina pectoris ([facility])     Anxiety     Bronchitis     Chronic fatigue syndrome     Chronic pain     COPD (chronic obstructive pulmonary disease) ([facility])     Generalized pain     Headache, tension-type     HTN (hypertension)     Hyperlipidemia     Hypokalemia     Insomnia     Leg swelling     Lyme disease     Nicotine dependence     OA (osteoarthritis)     RA (rheumatoid arthritis) ([facility])     Vertigo     Vitamin D deficiency      Labs:  Lab Results   Component Value Date/Time    Sodium 140 05/14/2017 04:34 AM    Potassium 3.6 05/14/2017 04:34 AM    Chloride 108 05/14/2017 04:34 AM    CO2 21 05/14/2017 04:34 AM Anion gap 11 05/14/2017 04:34 AM    Glucose 92 05/14/2017 04:34 AM    BUN 5 05/14/2017 04:34 AM    Creatinine 0.47 05/14/2017 04:34 AM    Calcium 7.9 05/14/2017 04:34 AM    Magnesium 2.2 05/09/2017 11:10 PM    Phosphorus 3.3 05/08/2017 09:13 AM    Albumin 2.4 05/08/2017 09:13 AM     Anthropometrics: BMI (calculated): 27.6 Last 3 Recorded Weights in this Encounter    05/06/17 0330 05/06/17 0600 05/15/17 0600   Weight: 68.1 kg (150 lb 2.1 oz) 68.1 kg (150 lb 2.1 oz) 73 kg (161 lb)    Ht Readings from Last 1 Encounters:   05/06/17 5' 4\" (1.626 m)     []  Weight Loss  [x]  Weight Gain  []  Weight Stable   []  New wt n/a on record     Estimated Nutrition Needs:    Calories:   9849-8995 kcal  Protein:      70-82 gms       Nutrition Problems Identified:   [x] Suboptimal PO intake   [] Food Allergies  [x] Difficulty chewing/swallowing/poor dentition  [] Constipation/Diarrhea   [] Nausea/Vomiting   [] None  [] Other:     Plan:   [] Therapeutic Diet  []  Obtained/adjusted food preferences/tolerances and/or snacks options   []  Supplements added   [x] Occupational therapy following for feeding techniques  []  HS snack added   [x]  Modify diet texture   []  Modify diet for food allergies   []  Educate patient   []  Assist with menu selection   [x]  Monitor PO intake on meal rounds   [x]  Continue inpatient monitoring and intervention   []  Participated in discharge planning/Interdisciplinary rounds/Team meetings   []  Other:     Education Needs:   [x] Not appropriate for teaching at this time due to:   [] Identified and addressed    Nutrition Monitoring and Evaluation:   [x] Continue inpatient monitoring and interventions    [] Other:     Nichole Davis  Pager: [phone]

## 2017-05-17 NOTE — ROUTINE PROCESS
Bedside and Verbal shift change report given to [employer] (oncoming nurse) by neil alford rn (offgoing nurse). Report given with SBAR, Kardex, Intake/Output and Recent Results.

## 2017-05-17 NOTE — ROUTINE PROCESS
Bedside / verbal shift change report given to Argelia RN   (oncoming nurse) by [employer] RN (offgoing nurse). Report included the following information SBAR, Kardex, Intake/Output, MAR and Recent Results.

## 2017-05-17 NOTE — PROGRESS NOTES
Hospitalist Progress Note  Skip Culp MD  Internal medicine/ Hospitalist    Daily Progress Note: 5/17/2017 10:24 AM      Interval history / Subjective:    56F presented to ED with worsening AMS. Not eating nor drinking for 5 days. PMHx remarkable for COPD, major depression and attempted suicide requiring inpatient pych stay 10/2016. CT head in ER wnl. Severe hypokalemia 1.9 and +UA she was admitted and K+ replaced via oral and IV routes, on emperic rocephin urine culture negative. Psych consulted 4/28 but did not have a clear picture of patients picture and was unable to reach boyfriend who is the only known contact, patient has no other family. Celexa recommended and started 4/29. MRI head completed showing only mild atrophy/SVIC. Patient continued to be delusional about her thick saliva, and got tachypnic when anyone came near her. She also stated she was \"not in control of parts of her body. She has been awake alert and oriented. Pt noted with transamnitis on presentation. Hepatitis panel neg. CLAUDIA neg. CRP low normal. She has received 4 days of antibiotics for culture negative UTI, K+ replaced. 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 Patient has had progressive symmetric ascending motor paralysis over 3-4 weeks. After multiple tests,including nerve conduction study which showed more an axonal process so that suggest axonal type GBS,patient was started on IVIG for 5 days which she has complted. Also was prescribed thiamine,folic acid. She is on gabapentin 600 mg po tid for her painful paresthesias. CSF showed elevated protein with no cells. Elevated CMV IgG but not IgM. HIV test negative. Rehab has been recommended by neurology.         Current Facility-Administered Medications   Medication Dose Route Frequency    pseudoephedrine (SUDAFED) tablet 30 mg  30 mg Oral Q8H PRN    dextrose 5% - 0.45% NaCl with KCl 20 mEq/L infusion  100 mL/hr IntraVENous CONTINUOUS    klack's mouthwash oral suspension (COMPOUNDED)  5 mL Oral QID    loratadine (CLARITIN) tablet 10 mg  10 mg Oral DAILY    Thiamine Mononitrate (B-1) tablet 100 mg  100 mg Oral DAILY    gabapentin (NEURONTIN) capsule 600 mg  600 mg Oral TID    senna (SENOKOT) tablet 17.2 mg  2 Tab Oral QHS    oxyCODONE IR (ROXICODONE) tablet 10 mg  10 mg Oral K8I PRN    folic acid (FOLVITE) tablet 1 mg  1 mg Oral DAILY    acetaminophen (TYLENOL) tablet 650 mg  650 mg Oral Q4H PRN    scopolamine (TRANSDERM-SCOP) 1.5 mg  1.5 mg TransDERmal Q72H    QUEtiapine (SEROquel) tablet 50 mg  50 mg Oral QHS    citalopram (CELEXA) tablet 20 mg  20 mg Oral DAILY    benzocaine-menthol (CEPACOL) lozenge 1 Lozenge  1 Lozenge Mucous Membrane PRN    enoxaparin (LOVENOX) injection 40 mg  40 mg SubCUTAneous Q24H    ondansetron (ZOFRAN) injection 4 mg  4 mg IntraVENous Q4H PRN    LORazepam (ATIVAN) tablet 1 mg  1 mg Oral Q6H PRN    oxymetazoline (AFRIN) 0.05 % nasal spray 2 Spray  2 Spray Both Nostrils BID PRN    sodium chloride (NS) flush 5-10 mL  5-10 mL IntraVENous PRN        Review of Systems  Feels better today,asking for when she is going to rehab. Objective:     Visit Vitals    /84    Pulse (!) 110  Comment: RN aware of pt rt of 110    Temp 98.6 °F (37 °C)    Resp 14    Ht 5' 4\" (1.626 m)    Wt 73 kg (161 lb)    SpO2 95%    BMI 27.64 kg/m2      O2 Device: Room air    Temp (24hrs), Av.7 °F (37.1 °C), Min:98.6 °F (37 °C), Max:98.7 °F (37.1 °C)     1901 - 05/15 0700  In: 3107 [P.O.:200; I.V.:3985]  Out: 1620 [Urine:1620]  P/E  NAD,awake,alert. Heent:perrla,at/nc,mouth moist.  Lungs catb  Heart s1s2 nl,no m/g  Abdm:soft,not tender,bs present. Extr:no edema,good pedis pulses. Neuro:awake,alert,orx3,strength 4/5 LE and UE. Data Review    No results found for this or any previous visit (from the past 12 hour(s)).       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)      Quadriparesis ([facility] (5/13/2017)      Care Plan  1- GBS Axonal Type  Completed IVIG for 5 days   2- UTI, completed treatment   3- Transaminitis Likely 2/2 Fatty Liver  4- COPD, stable   5- Depression w/ Possible Component of Psychosis, on celexa and seroquel   6- Acute Metabolic Encephalopathy, resolved  7- AICHA, resolved  8- otitis media, PCN and levaquin allergy, completed  doxy  9- chronic pain/paresthesia    - thiamine and folate def, on supplement,neurontin  10- allergic rhinitis, add claritin  11- dysphagia, improved with magic mouthwash  SLP to eval   12- transient hypotension 2/2 poor po intake, no sign of sepsis   DVT prophylaxis:on lovenox  Full code  Disposition:waiting for placement.

## 2017-05-17 NOTE — PROGRESS NOTES
Assumed patient care. Patient is awake, alert, oriented x4. Respiration is even, unlabored. Bed is locked and in lowest position. Not in acute distress.

## 2017-05-17 NOTE — PROGRESS NOTES
INFECTIOUS DISEASE FOLLOW UP NOTE :    Admit Date: 4/26/2017    Pt stable-appearing ID perspective and see little to add to care at this point.     -We will sign off but remain available. ->Please call at [phone] if further question or concern for ID. Thank you. Current abx Prior abx   Off abx 5/13-4 Cefepime 4/28 - 0, Ceftriaxone 4/29 - 1 doxy 5/9-4      ASSESSMENT - > REC:      Isolated low grade fever 5/14  - ? Etiology, lower since; no central line, not toxic appearing  -CXR s/o atelectasis, bctx's ngtd x 2, last wbc not elevated, denied cough diarrhea or dysuria ->pul toilet  ->monitor bctx's   - no  sx to suggest uti and multiple mixed org in uctx s/o contam  ->pul toilet per IM   Guilain [place] syndrome  - suspect precipitating factor was acute viral pharyngitis (7 days PTA) No recent flu-like illness, diarrheal episodes, no recent vaccinations, no recent travel  - no evidence of pharyngeal inflammation or any other active infection   - axonal variant - per Neurology  - s/p 5 dose IVIG  - Neurologic symptoms persist: Psych feels depression is under fair control and cannot definitively diagnose conversion disorder -> per Neuro, others   Severe Hypokalemia on admission  - due to poor po intake  - corrected - resolved   Abnormal CSF  - elevated protein, normal glucose without leukorrhachia  - c/w GBS - CSF studies not s/o infection    Abnormal LFT's  - has diffuse hepatic steatosis on CT +/- dehydration  - transaminases, alk phos improving - Slightly better   Otitis media- c/o pain in ears and decreased hearing - was on Doxy per IM, now off abx   HTN      COPD     OA/ RA     Hyperlipidemia     ? Chronic Fatigue Syndrome     h/o Lyme Disease  - dx'd ~ 2001 rx'd w/ 2 1-month courses doxycyline 2004 and 2006 and subsequently w/ multiple persistent complaints.  multiple + IgG western blots and negative IgM western blots dating back to 2003 (per Dr. Melanie Darling ID, 2011: no further treatment recommended)  - 5/4/2017 Lyme WB: neg IgG, IgM       MICROBIOLOGY:   4/26 blcx NG x 2          urcx  NG  MRSA scrn neg  5/5 CSF cx ng  5/14 bctx x 2 ngtd   uctx mixed <10k gnr, 50k mixed ugf, 50k yeast     LINES AND CATHETERS:   piv     MEDICATIONS:     Current Facility-Administered Medications   Medication Dose Route Frequency    pseudoephedrine (SUDAFED) tablet 30 mg  30 mg Oral Q8H PRN    dextrose 5% - 0.45% NaCl with KCl 20 mEq/L infusion  100 mL/hr IntraVENous CONTINUOUS    klack's mouthwash oral suspension (COMPOUNDED)  5 mL Oral QID    loratadine (CLARITIN) tablet 10 mg  10 mg Oral DAILY    Thiamine Mononitrate (B-1) tablet 100 mg  100 mg Oral DAILY    gabapentin (NEURONTIN) capsule 600 mg  600 mg Oral TID    senna (SENOKOT) tablet 17.2 mg  2 Tab Oral QHS    oxyCODONE IR (ROXICODONE) tablet 10 mg  10 mg Oral W5T PRN    folic acid (FOLVITE) tablet 1 mg  1 mg Oral DAILY    acetaminophen (TYLENOL) tablet 650 mg  650 mg Oral Q4H PRN    scopolamine (TRANSDERM-SCOP) 1.5 mg  1.5 mg TransDERmal Q72H    QUEtiapine (SEROquel) tablet 50 mg  50 mg Oral QHS    citalopram (CELEXA) tablet 20 mg  20 mg Oral DAILY    benzocaine-menthol (CEPACOL) lozenge 1 Lozenge  1 Lozenge Mucous Membrane PRN    enoxaparin (LOVENOX) injection 40 mg  40 mg SubCUTAneous Q24H    ondansetron (ZOFRAN) injection 4 mg  4 mg IntraVENous Q4H PRN    LORazepam (ATIVAN) tablet 1 mg  1 mg Oral Q6H PRN    oxymetazoline (AFRIN) 0.05 % nasal spray 2 Spray  2 Spray Both Nostrils BID PRN    sodium chloride (NS) flush 5-10 mL  5-10 mL IntraVENous PRN       SUBJECTIVE :     Interval notes reviewed.  Pt awake alert d/w her T ok bctx's neg and we will sign off but remain available     OBJECTIVE     Visit Vitals    /84    Pulse (!) 110  Comment: RN aware of pt rt of 110    Temp 98.6 °F (37 °C)    Resp 14    Ht 5' 4\" (1.626 m)    Wt 73 kg (161 lb)    SpO2 95%    BMI 27.64 kg/m2 Temp (24hrs), Av.7 °F (37.1 °C), Min:98.6 °F (37 °C), Max:98.7 °F (37.1 °C)    Gen-WDWN WF lying in bed NAD  Chest- Symmetric expansion  CV- mild tachycardia persists  Abd-Soft, ND  EXT: no edema   Neuro: awake and oriented, appears globally weak      Labs: Results:   Chemistry No results for input(s): GLU, NA, K, CL, CO2, BUN, CREA, CA, AGAP, BUCR, TBIL, GPT, AP, TP, ALB, GLOB, AGRAT in the last 72 hours. CBC w/Diff No results for input(s): WBC, RBC, HGB, HCT, PLT, GRANS, LYMPH, EOS, HGBEXT, HCTEXT, PLTEXT, HGBEXT, HCTEXT, PLTEXT in the last 72 hours. MBS  FINDINGS:     -Thin: No findings of laryngeal penetration or aspiration.     -Pudding consistency : Swallowing delay with premature spillage. No reported  aspiration or penetration.     -Solid consistency: Swallowing delay with premature spillage. No findings of  laryngeal penetration or aspiration.     -13 mm barium pill with thin barium: No findings of aspiration or penetration.  cxr IMPRESSION: Predominantly linear left lower lobe airspace opacity may represent  atelectasis, however developing pneumonia is also possible    RAJI Alford MD  May 17, 2017    [facility]  [phone]

## 2017-05-18 PROCEDURE — 97110 THERAPEUTIC EXERCISES: CPT

## 2017-05-18 PROCEDURE — 65660000000 HC RM CCU STEPDOWN

## 2017-05-18 PROCEDURE — 74011250637 HC RX REV CODE- 250/637: Performed by: PSYCHIATRY & NEUROLOGY

## 2017-05-18 PROCEDURE — 74011250637 HC RX REV CODE- 250/637: Performed by: INTERNAL MEDICINE

## 2017-05-18 PROCEDURE — 74011250637 HC RX REV CODE- 250/637: Performed by: HOSPITALIST

## 2017-05-18 PROCEDURE — 74011250636 HC RX REV CODE- 250/636: Performed by: FAMILY MEDICINE

## 2017-05-18 PROCEDURE — 74011250636 HC RX REV CODE- 250/636: Performed by: INTERNAL MEDICINE

## 2017-05-18 RX ADMIN — OXYCODONE HYDROCHLORIDE 10 MG: 5 TABLET ORAL at 22:15

## 2017-05-18 RX ADMIN — HYDROCORTISONE 5 ML: 10 TABLET ORAL at 18:00

## 2017-05-18 RX ADMIN — OXYCODONE HYDROCHLORIDE 10 MG: 5 TABLET ORAL at 09:07

## 2017-05-18 RX ADMIN — Medication 100 MG: at 09:06

## 2017-05-18 RX ADMIN — LORATADINE 10 MG: 10 TABLET ORAL at 09:07

## 2017-05-18 RX ADMIN — HYDROCORTISONE 5 ML: 10 TABLET ORAL at 13:33

## 2017-05-18 RX ADMIN — SENNOSIDES 17.2 MG: 8.6 TABLET, FILM COATED ORAL at 22:14

## 2017-05-18 RX ADMIN — DEXTROSE MONOHYDRATE, SODIUM CHLORIDE, AND POTASSIUM CHLORIDE 100 ML/HR: 50; 4.5; 1.49 INJECTION, SOLUTION INTRAVENOUS at 16:32

## 2017-05-18 RX ADMIN — CITALOPRAM HYDROBROMIDE 20 MG: 20 TABLET ORAL at 09:07

## 2017-05-18 RX ADMIN — GABAPENTIN 600 MG: 300 CAPSULE ORAL at 17:53

## 2017-05-18 RX ADMIN — GABAPENTIN 600 MG: 300 CAPSULE ORAL at 09:07

## 2017-05-18 RX ADMIN — GABAPENTIN 600 MG: 300 CAPSULE ORAL at 13:33

## 2017-05-18 RX ADMIN — HYDROCORTISONE 5 ML: 10 TABLET ORAL at 10:00

## 2017-05-18 RX ADMIN — QUETIAPINE FUMARATE 50 MG: 25 TABLET, FILM COATED ORAL at 22:14

## 2017-05-18 RX ADMIN — HYDROCORTISONE 5 ML: 10 TABLET ORAL at 22:18

## 2017-05-18 RX ADMIN — ENOXAPARIN SODIUM 40 MG: 40 INJECTION SUBCUTANEOUS at 04:20

## 2017-05-18 RX ADMIN — FOLIC ACID 1 MG: 1 TABLET ORAL at 09:07

## 2017-05-18 NOTE — PROGRESS NOTES
Problem: Self Care Deficits Care Plan (Adult)  Goal: *Acute Goals and Plan of Care (Insert Text)  Occupational Therapy Goals  Initiated 4/29/2017; re-evaluated on 5/16/2017 within 7 day(s). Continue with all goals. 1. Patient will perform self-feeding with minimal assistance/contact guard assist, adaptive strategies prn.   2. Patient will perform grooming with minimal assistance/contact guard assist.  3. Patient will perform upper body dressing with minimal assistance/contact guard assist.  4. Patient will perform functional task for 3 minutes while seated on EOB with mod assist for balance. 5. Patient will participate in upper extremity therapeutic exercise/activities with minimal assistance/contact guard assist for 8 minutes to increase strength/endurance for ADLs. 6. Patient will perform functional task with minimal assistance utilizing adaptive strategies, prn, to maintain function for ADLs. Outcome: Progressing Towards Goal  OCCUPATIONAL THERAPY TREATMENT     Patient: Shandra Holden (89 y.o. female)  Date: 5/18/2017  Diagnosis: Hypokalemia  UTI (urinary tract infection)  Dehydration  Encephalopathy  Polycythemia (HCC)  Elevated liver enzymes  Lactic acidosis  Leukocytosis Encephalopathy       Precautions: Fall  Chart, occupational therapy assessment, plan of care, and goals were reviewed. ASSESSMENT:  Pt sleeping upon entry, arouse to voice. Pt requires max encouragement for minimal participation despite education benefits. Pt demonstrates AROM BUE elbow/wrist, reaching for remote and operating volume control w/increase time. Pt c/o pain w/gentle ROM  BUE and refuses further ADL retraining.    EDUCATION UE TherEx  Progression toward goals:  [ ]          Improving appropriately and progressing toward goals  [X]          Improving slowly and progressing toward goals  [ ]          Not making progress toward goals and plan of care will be adjusted       PLAN:  Patient continues to benefit from skilled intervention to address the above impairments. Continue treatment per established plan of care. Discharge Recommendations:  Skilled Nursing Facility/LTC  Further Equipment Recommendations for Discharge:   TBD once pt OOB, otherwise hospital bed       SUBJECTIVE:   Patient stated I just want to go back to sleep.       OBJECTIVE DATA SUMMARY:         Cognitive/Behavioral Status:  Neurologic State: Drowsy  Orientation Level: Oriented X4  Cognition: Decreased attention/concentration, Decreased command following  Safety/Judgement: Fall prevention     Therapeutic Exercises:   PROM LUE shoulder flexion/extension  AAROM > AROM LUE elbow/wrsit flexion/extension  AAROM RUE shoulder/elbow/wrist flexion/extension     Pain:  Pre Treatment:0  Post Treatment:0  Pt c/o pain w/gentle ROM BUEs     Activity Tolerance:    Poor     Please refer to the flowsheet for vital signs taken during this treatment.   After treatment:   [ ]  Patient left in no apparent distress sitting up in chair  [X]  Patient left in no apparent distress in bed  [X]  Call bell left within reach  [ ]  Nursing notified  [ ]  Caregiver present  [ ]  Bed alarm activated     ANGÉLICA Woods  Time Calculation: 8 mins

## 2017-05-18 NOTE — PROGRESS NOTES
Assumed care of patient, patient in bed resting. Bed in lowest position. Patient sleping.  Sbasr report received from [facility]

## 2017-05-18 NOTE — PROGRESS NOTES
Received  Level 2 screening  Results. Notified pt  That only  Accepting facility is [facility], she agrees to  Go there. Called [place], spoke with [facility], they will have a bed avail tomorrow. Notified Dr Blanche Andrews. fax'd uai  And level 2 screening  To  [facility]. Updates placed in edc. Transport set  For 11 am tomorrow,  Friday to [place]  With [facility]. [place] [facility]  Does not pre auth stretcher transport. pcs completed. Envelope in nurses station, notified pt and she chooses to  Notify  Her friend.

## 2017-05-18 NOTE — PROGRESS NOTES
Hospitalist Progress Note  Atul Cho MD  Internal medicine/ Hospitalist    Daily Progress Note: 5/18/2017 10:24 AM      Interval history / Subjective:    56F presented to ED with worsening AMS. Not eating nor drinking for 5 days. PMHx remarkable for COPD, major depression and attempted suicide requiring inpatient [facility] stay 10/2016. CT head in ER wnl. Severe hypokalemia 1.9 and +UA she was admitted and K+ replaced via oral and IV routes, on emperic rocephin urine culture negative. Psych consulted 4/28 but did not have a clear picture of patients picture and was unable to reach boyfriend who is the only known contact, patient has no other family. Celexa recommended and started 4/29. MRI head completed showing only mild atrophy/SVIC. Patient continued to be delusional about her thick saliva, and got tachypnic when anyone came near her. She also stated she was \"not in control of parts of her body. She has been awake alert and oriented. Pt noted with transamnitis on presentation. Hepatitis panel neg. CLAUDIA neg. CRP low normal. She has received 4 days of antibiotics for culture negative UTI, K+ replaced. 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 Patient has had progressive symmetric ascending motor paralysis over 3-4 weeks. After multiple tests,including nerve conduction study which showed more an axonal process so that suggest axonal type GBS,patient was started on IVIG for 5 days which she has complted. Also was prescribed thiamine,folic acid. She is on gabapentin 600 mg po tid for her painful paresthesias. CSF showed elevated protein with no cells. Elevated CMV IgG but not IgM. HIV test negative. Rehab has been recommended by neurology.         Current Facility-Administered Medications   Medication Dose Route Frequency    pseudoephedrine (SUDAFED) tablet 30 mg  30 mg Oral Q8H PRN    dextrose 5% - 0.45% NaCl with KCl 20 mEq/L infusion  100 mL/hr IntraVENous CONTINUOUS    klack's mouthwash oral suspension (COMPOUNDED)  5 mL Oral QID    loratadine (CLARITIN) tablet 10 mg  10 mg Oral DAILY    Thiamine Mononitrate (B-1) tablet 100 mg  100 mg Oral DAILY    gabapentin (NEURONTIN) capsule 600 mg  600 mg Oral TID    senna (SENOKOT) tablet 17.2 mg  2 Tab Oral QHS    oxyCODONE IR (ROXICODONE) tablet 10 mg  10 mg Oral F6N PRN    folic acid (FOLVITE) tablet 1 mg  1 mg Oral DAILY    acetaminophen (TYLENOL) tablet 650 mg  650 mg Oral Q4H PRN    scopolamine (TRANSDERM-SCOP) 1.5 mg  1.5 mg TransDERmal Q72H    QUEtiapine (SEROquel) tablet 50 mg  50 mg Oral QHS    citalopram (CELEXA) tablet 20 mg  20 mg Oral DAILY    benzocaine-menthol (CEPACOL) lozenge 1 Lozenge  1 Lozenge Mucous Membrane PRN    enoxaparin (LOVENOX) injection 40 mg  40 mg SubCUTAneous Q24H    ondansetron (ZOFRAN) injection 4 mg  4 mg IntraVENous Q4H PRN    LORazepam (ATIVAN) tablet 1 mg  1 mg Oral Q6H PRN    oxymetazoline (AFRIN) 0.05 % nasal spray 2 Spray  2 Spray Both Nostrils BID PRN    sodium chloride (NS) flush 5-10 mL  5-10 mL IntraVENous PRN        Review of Systems  Feels better today,asking for when she is going to rehab. Objective:     Visit Vitals    /80 (BP 1 Location: Left arm, BP Patient Position: Hip tilt, left)    Pulse (!) 115    Temp 99.2 °F (37.3 °C)    Resp 18    Ht 5' 4\" (1.626 m)    Wt 73 kg (161 lb)    SpO2 95%    BMI 27.64 kg/m2      O2 Device: Room air    Temp (24hrs), Av.5 °F (37.5 °C), Min:99.2 °F (37.3 °C), Max:99.8 °F (37.7 °C)     1901 - 05/15 0700  In: 6148 [P.O.:200; I.V.:3985]  Out: 1620 [Urine:1620]  P/E  NAD,awake,alert. Heent:perrla,at/nc,mouth moist.  Lungs catb  Heart s1s2 nl,no m/g  Abdm:soft,not tender,bs present. Extr:no edema,good pedis pulses. Neuro:awake,alert,orx3,strength 4/5 LE and UE.     Data Review    No results found for this or any previous visit (from the past 12 hour(s)). 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)      Quadriparesis ([address] (5/13/2017)      Care Plan  1- GBS Axonal Type  Completed IVIG for 5 days   2- UTI, completed treatment   3- Transaminitis Likely 2/2 Fatty Liver  4- COPD, stable   5- Depression w/ Possible Component of Psychosis, on celexa and seroquel   6- Acute Metabolic Encephalopathy, resolved  7- AICHA, resolved  8- otitis media, PCN and levaquin allergy, completed  doxy  9- chronic pain/paresthesia    - thiamine and folate def, on supplement,neurontin  10- allergic rhinitis, add claritin  11- dysphagia, improved with magic mouthwash  SLP to eval   12- transient hypotension 2/2 poor po intake, no sign of sepsis   DVT prophylaxis:on lovenox  Full code  Disposition:waiting for placement.

## 2017-05-18 NOTE — PROGRESS NOTES
conducted a Follow up consultation and Spiritual Assessment for Shandra Holden, who is a 60 y.o.,female. The  provided the following Interventions:  Continued the relationship of care and support. Listened empathically. Offered prayer and assurance of continued prayer on patients behalf. Chart reviewed. The following outcomes were achieved:  Patient expressed gratitude for pastoral care visit. Assessment:  There are no further spiritual or Moravian issues which require Spiritual Care Services interventions at this time. Plan:  Chaplains will continue to follow and will provide pastoral care on an as needed/requested basis.  recommends bedside caregivers page  on duty if patient shows signs of acute spiritual or emotional distress.      [address] [facility]   Staff [address] [facility]   [phone]

## 2017-05-18 NOTE — CONSULTS
Pulmonary:    Last seen by PCCM on 5/13/17  Waiting for placement  Please call us for any Pulmonary questions/cpncerns/interventions or procedures  O) [phone]  Thanks

## 2017-05-18 NOTE — PROGRESS NOTES
Pt with complaints of pain at IV site. IV removed. Pt refused IV to be restarted until 0500. Pt states \" I just want to rest right now\".

## 2017-05-18 NOTE — INTERDISCIPLINARY ROUNDS
IDR Summary      Patient: Shandra Holden MRN: [id number]    Age: 60 y.o.  : 1960     Admit Diagnosis: Hypokalemia  UTI (urinary tract infection)  Dehydration  Encephalopathy  Polycythemia (HCC)  Elevated liver enzymes  Lactic acidosis  Leukocytosis      Problems pertinent to hospital stay:     Consults:P.T, O.T., Speech and Case Management     Testing due for patient today?  NO    Nutrition plan:Yes     Mobility needs: Yes      Lines/Tubes:   IV: YES   Needed: YES  Haley: NO  Needed:NO  Central Line: NO Needed: NO      VTE Prophylaxis: Chemical          Care Management:  Discharge plan: LTC   PCP: Berhane Dodge MD    : NO  Financial concerns:No   Interventions:       LOS: 21 days     Expected days until discharge: tomorrow        Signed:     AMBROCIO Haji-BC  [address]  Hospitalist Division  Pager:  [phone]  Office:  [phone]

## 2017-05-19 VITALS
BODY MASS INDEX: 27.49 KG/M2 | RESPIRATION RATE: 18 BRPM | OXYGEN SATURATION: 100 % | SYSTOLIC BLOOD PRESSURE: 132 MMHG | DIASTOLIC BLOOD PRESSURE: 91 MMHG | HEIGHT: 64 IN | HEART RATE: 113 BPM | WEIGHT: 161 LBS | TEMPERATURE: 98.3 F

## 2017-05-19 PROCEDURE — 97535 SELF CARE MNGMENT TRAINING: CPT

## 2017-05-19 PROCEDURE — 74011250637 HC RX REV CODE- 250/637: Performed by: INTERNAL MEDICINE

## 2017-05-19 PROCEDURE — 74011250637 HC RX REV CODE- 250/637: Performed by: HOSPITALIST

## 2017-05-19 PROCEDURE — 74011250637 HC RX REV CODE- 250/637: Performed by: PSYCHIATRY & NEUROLOGY

## 2017-05-19 PROCEDURE — 74011250636 HC RX REV CODE- 250/636: Performed by: FAMILY MEDICINE

## 2017-05-19 RX ORDER — GABAPENTIN 300 MG/1
600 CAPSULE ORAL 3 TIMES DAILY
Qty: 30 CAP | Refills: 0 | Status: SHIPPED
Start: 2017-05-19 | End: 2018-09-20

## 2017-05-19 RX ORDER — ACETAMINOPHEN 325 MG/1
650 TABLET ORAL
Qty: 30 TAB | Refills: 0 | Status: SHIPPED
Start: 2017-05-19

## 2017-05-19 RX ORDER — ASPIRIN 325 MG/1
100 TABLET, FILM COATED ORAL DAILY
Qty: 30 TAB | Refills: 0 | Status: SHIPPED
Start: 2017-05-19

## 2017-05-19 RX ORDER — SCOLOPAMINE TRANSDERMAL SYSTEM 1 MG/1
1.5 PATCH, EXTENDED RELEASE TRANSDERMAL
Qty: 10 PATCH | Refills: 0 | Status: SHIPPED | OUTPATIENT
Start: 2017-05-19

## 2017-05-19 RX ORDER — ENOXAPARIN SODIUM 100 MG/ML
40 INJECTION SUBCUTANEOUS EVERY 24 HOURS
Qty: 10 SYRINGE | Refills: 0 | Status: SHIPPED
Start: 2017-05-19

## 2017-05-19 RX ORDER — SENNOSIDES 8.6 MG/1
2 TABLET ORAL
Qty: 10 TAB | Refills: 0 | Status: SHIPPED
Start: 2017-05-19

## 2017-05-19 RX ORDER — FOLIC ACID 1 MG/1
1 TABLET ORAL DAILY
Qty: 30 TAB | Refills: 0 | Status: SHIPPED
Start: 2017-05-19

## 2017-05-19 RX ORDER — OXYCODONE HYDROCHLORIDE 10 MG/1
10 TABLET ORAL
Qty: 12 TAB | Refills: 0 | Status: SHIPPED | OUTPATIENT
Start: 2017-05-19

## 2017-05-19 RX ADMIN — ENOXAPARIN SODIUM 40 MG: 40 INJECTION SUBCUTANEOUS at 05:57

## 2017-05-19 RX ADMIN — LORATADINE 10 MG: 10 TABLET ORAL at 09:25

## 2017-05-19 RX ADMIN — GABAPENTIN 600 MG: 300 CAPSULE ORAL at 09:25

## 2017-05-19 RX ADMIN — HYDROCORTISONE 5 ML: 10 TABLET ORAL at 09:24

## 2017-05-19 RX ADMIN — OXYCODONE HYDROCHLORIDE 10 MG: 5 TABLET ORAL at 01:45

## 2017-05-19 RX ADMIN — OXYCODONE HYDROCHLORIDE 10 MG: 5 TABLET ORAL at 05:56

## 2017-05-19 RX ADMIN — CITALOPRAM HYDROBROMIDE 20 MG: 20 TABLET ORAL at 09:25

## 2017-05-19 RX ADMIN — Medication 100 MG: at 09:25

## 2017-05-19 RX ADMIN — FOLIC ACID 1 MG: 1 TABLET ORAL at 09:25

## 2017-05-19 NOTE — DISCHARGE INSTRUCTIONS
DISCHARGE SUMMARY from Nurse    The following personal items are in your possession at time of discharge:    Dental Appliances: None  Visual Aid: None     Home Medications: None  Jewelry: Bracelet, Ring  Clothing: Pants, Shirt  Other Valuables: None             PATIENT INSTRUCTIONS:    After general anesthesia or intravenous sedation, for 24 hours or while taking prescription Narcotics:  · Limit your activities  · Do not drive and operate hazardous machinery  · Do not make important personal or business decisions  · Do  not drink alcoholic beverages  · If you have not urinated within 8 hours after discharge, please contact your surgeon on call. Report the following to your surgeon:  · Excessive pain, swelling, redness or odor of or around the surgical area  · Temperature over 100.5  · Nausea and vomiting lasting longer than 4 hours or if unable to take medications  · Any signs of decreased circulation or nerve impairment to extremity: change in color, persistent  numbness, tingling, coldness or increase pain  · Any questions        What to do at Home:  Recommended activity: Activity as tolerated,    If you experience any of the following symptoms decrease weakness, please follow up with PCP. *  Please give a list of your current medications to your Primary Care Provider. *  Please update this list whenever your medications are discontinued, doses are      changed, or new medications (including over-the-counter products) are added. *  Please carry medication information at all times in case of emergency situations. These are general instructions for a healthy lifestyle:    No smoking/ No tobacco products/ Avoid exposure to second hand smoke    Surgeon General's Warning:  Quitting smoking now greatly reduces serious risk to your health.     Obesity, smoking, and sedentary lifestyle greatly increases your risk for illness    A healthy diet, regular physical exercise & weight monitoring are important for maintaining a healthy lifestyle    You may be retaining fluid if you have a history of heart failure or if you experience any of the following symptoms:  Weight gain of 3 pounds or more overnight or 5 pounds in a week, increased swelling in our hands or feet or shortness of breath while lying flat in bed. Please call your doctor as soon as you notice any of these symptoms; do not wait until your next office visit. Recognize signs and symptoms of STROKE:    F-face looks uneven    A-arms unable to move or move unevenly    S-speech slurred or non-existent    T-time-call 911 as soon as signs and symptoms begin-DO NOT go       Back to bed or wait to see if you get better-TIME IS BRAIN. Warning Signs of HEART ATTACK     Call 911 if you have these symptoms:   Chest discomfort. Most heart attacks involve discomfort in the center of the chest that lasts more than a few minutes, or that goes away and comes back. It can feel like uncomfortable pressure, squeezing, fullness, or pain.  Discomfort in other areas of the upper body. Symptoms can include pain or discomfort in one or both arms, the back, neck, jaw, or stomach.  Shortness of breath with or without chest discomfort.  Other signs may include breaking out in a cold sweat, nausea, or lightheadedness. Don't wait more than five minutes to call 911 - MINUTES MATTER! Fast action can save your life. Calling 911 is almost always the fastest way to get lifesaving treatment. Emergency Medical Services staff can begin treatment when they arrive -- up to an hour sooner than if someone gets to the hospital by car. The discharge information has been reviewed with the patient. The patient verbalized understanding. Discharge medications reviewed with the patient and appropriate educational materials and side effects teaching were provided.             Hypokalemia: Care Instructions  Your Care Instructions  Hypokalemia (say \"mz-kd-tmh-RAJ-natasha-uh\") is a low level of potassium. The heart, muscles, kidneys, and nervous system all need potassium to work well. This problem has many different causes. Kidney problems, diet, and medicines like diuretics and laxatives can cause it. So can vomiting or diarrhea. In some cases, cancer is the cause. Your doctor may do tests to find the cause of your low potassium levels. You may need medicines to bring your potassium levels back to normal. You may also need regular blood tests to check your potassium. If you have very low potassium, you may need intravenous (IV) medicines. You also may need tests to check the electrical activity of your heart. Heart problems caused by low potassium levels can be very serious. Follow-up care is a key part of your treatment and safety. Be sure to make and go to all appointments, and call your doctor if you are having problems. It's also a good idea to know your test results and keep a list of the medicines you take. How can you care for yourself at home? · If your doctor recommends it, eat foods that have a lot of potassium. These include fresh fruits, juices, and vegetables. They also include nuts, beans, and milk. · Be safe with medicines. If your doctor prescribes medicines or potassium supplements, take them exactly as directed. Call your doctor if you have any problems with your medicines. · Get your potassium levels tested as often as your doctor tells you. When should you call for help? Call 911 anytime you think you may need emergency care. For example, call if:  · You feel like your heart is missing beats. Heart problems caused by low potassium can cause death. · You passed out (lost consciousness). · You have a seizure. Call your doctor now or seek immediate medical care if:  · You feel weak or unusually tired. · You have severe arm or leg cramps. · You have tingling or numbness. · You feel sick to your stomach, or you vomit. · You have belly cramps.   · You feel bloated or constipated. · You have to urinate a lot. · You feel very thirsty most of the time. · You are dizzy or lightheaded, or you feel like you may faint. · You feel depressed, or you lose touch with reality. Watch closely for changes in your health, and be sure to contact your doctor if:  · You do not get better as expected. Where can you learn more? Go to http://angel-huey.info/. Enter G358 in the search box to learn more about \"Hypokalemia: Care Instructions. \"  Current as of: July 28, 2016  Content Version: 11.2  © 4277-7117 [employer]. Care instructions adapted under license by [employer] (which disclaims liability or warranty for this information). If you have questions about a medical condition or this instruction, always ask your healthcare professional. [address] any warranty or liability for your use of this information.

## 2017-05-19 NOTE — DISCHARGE SUMMARY
Discharge Summary    Patient: Julianna Gu               Sex: female          DOA: 4/26/2017         YOB: 1960      Age:  60 y.o.        LOS:  LOS: 22 days                Admit Date: 4/26/2017    Discharge Date: 5/19/2017    Admission Diagnoses: Hypokalemia  UTI (urinary tract infection)  Dehydration  Encephalopathy  Polycythemia (HCC)  Elevated liver enzymes  Lactic acidosis  Leukocytosis    Discharge Diagnoses:    Problem List as of 5/19/2017  Date Reviewed: 1/18/2016          Codes Class Noted - Resolved    Quadriparesis ([facility] ICD-10-CM: G82.50  ICD-9-CM: 344.00  5/13/2017 - Present        Acute inflammatory polyneuropathy ([facility] ICD-10-CM: G61.0  ICD-9-CM: 357.0  5/5/2017 - Present        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  ICD-9-CM: 357.0  5/5/2017 - Present        Dehydration ICD-10-CM: E86.0  ICD-9-CM: 276.51  4/27/2017 - Present        Hypokalemia ICD-10-CM: E87.6  ICD-9-CM: 276.8  4/27/2017 - Present        Lactic acidosis ICD-10-CM: E87.2  ICD-9-CM: 276.2  4/27/2017 - Present        Polycythemia ([facility] ICD-10-CM: D75.1  ICD-9-CM: 238.4  4/27/2017 - Present        Leukocytosis ICD-10-CM: D72.829  ICD-9-CM: 288.60  4/27/2017 - Present        UTI (urinary tract infection) ICD-10-CM: N39.0  ICD-9-CM: 599.0  4/27/2017 - Present        * (Principal)Encephalopathy ICD-10-CM: G93.40  ICD-9-CM: 348.30  4/27/2017 - Present        Elevated liver enzymes ICD-10-CM: R74.8  ICD-9-CM: 790.5  4/27/2017 - Present        Depression ICD-10-CM: F32.9  ICD-9-CM: 516  10/3/2016 - Present        Muscle spasms of neck ICD-10-CM: T04.074  ICD-9-CM: 728.85  4/25/2016 - Present        RLS (restless legs syndrome) ICD-10-CM: G25.81  ICD-9-CM: 333.94  4/25/2016 - Present        Drug-induced constipation ICD-10-CM: K59.03  ICD-9-CM: 564.09, E980.5  8/3/2015 - Present        Chronic headache disorder ICD-10-CM: R51  ICD-9-CM: 784.0  7/27/2013 - Present        Enthesopathy of hip region ICD-10-CM: P58.747  ICD-9-CM: 726.5  4/9/2013 - Present        Tobacco abuse ICD-10-CM: Z72.0  ICD-9-CM: 305.1  4/9/2013 - Present        Post-Lyme disease syndrome ICD-10-CM: B94.8  ICD-9-CM: 139.8  12/10/2011 - Present        Pain in joint, multiple sites ICD-10-CM: M25.50  ICD-9-CM: 719.49  12/10/2011 - Present        Cervicalgia ICD-10-CM: M54.2  ICD-9-CM: 723.1  12/10/2011 - Present        Chronic low back pain ICD-10-CM: M54.5, G89.29  ICD-9-CM: 724.2, 338.29  12/10/2011 - Present        Migraine without aura ICD-10-CM: G43.009  ICD-9-CM: 346.10  12/10/2011 - Present        Myalgia and myositis ICD-10-CM: [place] Gouty  ICD-9-CM: 729.1  12/10/2011 - Present        Encounter for long-term (current) use of high-risk medication ICD-10-CM: Z79.899  ICD-9-CM: V58.69  12/10/2011 - Present              Discharge Medications:     Current Discharge Medication List      START taking these medications    Details   !! acetaminophen (TYLENOL) 325 mg tablet Take 2 Tabs by mouth every four (4) hours as needed. Qty: 30 Tab, Refills: 0      enoxaparin (LOVENOX) 40 mg/0.4 mL 0.4 mL by SubCUTAneous route every twenty-four (24) hours. Qty: 10 Syringe, Refills: 0      folic acid (FOLVITE) 1 mg tablet Take 1 Tab by mouth daily. Qty: 30 Tab, Refills: 0      gabapentin (NEURONTIN) 300 mg capsule Take 2 Caps by mouth three (3) times daily. Qty: 30 Cap, Refills: 0      oxyCODONE IR (ROXICODONE) 10 mg tab immediate release tablet Take 1 Tab by mouth every four (4) hours as needed. Max Daily Amount: 60 mg.  Qty: 12 Tab, Refills: 0      scopolamine (TRANSDERM-SCOP) 1.5 mg (1 mg over 3 days) pt3d 1 Patch by TransDERmal route every seventy-two (72) hours. Qty: 10 Patch, Refills: 0      senna (SENOKOT) 8.6 mg tablet Take 2 Tabs by mouth nightly. Qty: 10 Tab, Refills: 0      Thiamine Mononitrate (B-1) 100 mg tablet Take 1 Tab by mouth daily. Qty: 30 Tab, Refills: 0       !! - Potential duplicate medications found. Please discuss with provider. CONTINUE these medications which have NOT CHANGED    Details   !! acetaminophen (TYLENOL) 325 mg tablet Take 2 Tabs by mouth every four (4) hours as needed. Indications: BACK PAIN  Qty: 60 Tab, Refills: 0      atenolol (TENORMIN) 50 mg tablet Take 1 Tab by mouth daily. Indications: HYPERTENSION  Qty: 30 Tab, Refills: 0      citalopram (CELEXA) 20 mg tablet Take 1 Tab by mouth daily. Indications: MAJOR DEPRESSIVE DISORDER  Qty: 30 Tab, Refills: 0      clonazePAM (KLONOPIN) 1 mg tablet Take 1 Tab by mouth nightly. Max Daily Amount: 1 mg. Indications: PANIC DISORDER  Qty: 30 Tab, Refills: 0      !! famotidine (PEPCID) 40 mg tablet Take 1 Tab by mouth daily. Indications: DYSPEPSIA, HEARTBURN PREVENTION  Qty: 30 Tab, Refills: 0      loratadine (CLARITIN) 10 mg tablet Take 1 Tab by mouth daily. Indications: ALLERGIC RHINITIS  Qty: 30 Tab, Refills: 0      mirtazapine (REMERON) 7.5 mg tablet Take 1 Tab by mouth nightly. Indications: MAJOR DEPRESSIVE DISORDER  Qty: 30 Tab, Refills: 0      pseudoephedrine (SUDAFED) 30 mg tablet Take 1 Tab by mouth every six (6) hours as needed for Congestion. Indications: NASAL CONGESTION  Qty: 60 Tab, Refills: 0      QUEtiapine (SEROQUEL) 100 mg tablet Take 1 Tab by mouth nightly. Indications: DEPRESSION TREATMENT ADJUNCT  Qty: 30 Tab, Refills: 0      !! tiZANidine (ZANAFLEX) 4 mg tablet One tablet every 6 hours as needed for muscle spasm  Indications: MUSCLE SPASM  Qty: 90 Tab, Refills: 0      fluticasone (FLONASE) 50 mcg/actuation nasal spray 1 [place] by Both Nostrils route daily. Qty: 1 Bottle, Refills: 0      !! tiZANidine (ZANAFLEX) 4 mg tablet Take 1-2 Tabs by mouth three (3) times daily. As needed for muscle spasms  Qty: 180 Tab, Refills: 2      !! famotidine (PEPCID) 40 mg tablet Take 40 mg by mouth daily. nitroglycerin (NITROSTAT) 0.4 mg SL tablet by SubLINGual route every five (5) minutes as needed. desloratadine (CLARINEX) 5 mg tablet Take 5 mg by mouth daily. potassium chloride (KLOR-CON M10) 10 mEq tablet Take  by mouth two (2) times a day. albuterol (PROAIR HFA) 90 mcg/Actuation inhaler Take 1 Puff by inhalation. dextromethorphan-guaifenesin (MUCINEX DM)  mg per tablet Take 1 Tab by mouth two (2) times a day. !! - Potential duplicate medications found. Please discuss with provider. Follow-up:pcp and neurology    Discharge Condition:stable    Activity:as tolerated    Diet:1. Dental Soft          2. Monitor weight and PO intake          3. RD to follow      Labs:  Labs: Results:       Chemistry No results for input(s): GLU, NA, K, CL, CO2, BUN, CREA, CA, AGAP, BUCR, TBIL, GPT, AP, TP, ALB, GLOB, AGRAT in the last 72 hours. CBC w/Diff No results for input(s): WBC, RBC, HGB, HCT, PLT, GRANS, LYMPH, EOS, HGBEXT, HCTEXT, PLTEXT in the last 72 hours. Cardiac Enzymes No results for input(s): CPK, CKND1, EMI in the last 72 hours. No lab exists for component: CKRMB, TROIP   Coagulation No results for input(s): PTP, INR, APTT in the last 72 hours. No lab exists for component: INREXT    Lipid Panel No results found for: CHOL, CHOLPOCT, CHOLX, CHLST, CHOLV, [id number], HDL, LDL, NLDLCT, DLDL, LDLC, DLDLP, [id number], VLDLC, VLDL, TGL, TGLX, TRIGL, [id number], TRIGP, TGLPOCT, [id number], CHHD, CHHDX   BNP No results for input(s): BNPP in the last 72 hours. Liver Enzymes No results for input(s): TP, ALB, TBIL, AP, SGOT, GPT in the last 72 hours. No lab exists for component: DBIL   Thyroid Studies Lab Results   Component Value Date/Time    TSH 1.84 04/26/2017 09:15 PM          Imaging:  CT head on 4/26/2017:  1. Subtle left-to-right shift of midline structures and asymmetric left  extra-axial fluid density with suggestion of possible small arachnoid cyst along  the anterior margin of the left sylvian fissure. Probable asymmetric left  frontal and left temporal lobe atrophy otherwise. MRI could better delineate  suspected extra-axial lesion.  No findings to suggest acute infarct. No acute  intracranial hemorrhage. 2. Mild cerebral atrophy/volume loss and periventricular white matter changes,  most commonly seen with chronic microvascular disease. CT abdomen on 4/27/2017:  No acute or focal abnormality to explain patient's abdominal pain. There is  diffuse hepatic steatosis. Cholelithiasis. Colonic diverticulosis with no  findings of an acute diverticulitis     MRI brain on 4/29:  1. Atrophy and mild to moderate chronic small vessel changes with asymmetric  volume loss in the left cerebral hemisphere which is of indeterminate etiology  and significance. 2. Otherwise, unremarkable evaluation. Specifically, no acute intracranial  abnormalities are identified. MRI lumbar spine 5/6/2017:  Potential for right L5 nerve root impingement in the extraforaminal zone. Consults:   Neurology  ID    Treatment Team: Treatment Team: Attending Provider: Janene Burch MD; Consulting Provider: Eliza Ureña MD; Consulting Provider: Tanya Brush MD; Care Manager: Alissa Eaton; Utilization Review: Danielle Souza; Consulting Provider: Deja Parker MD; Occupational Therapy Assistant: ANGÉLICA Holloawy    Significant Diagnostic Studies:    Procedure: Modified Barium Swallow with Speech Therapy  Indication: Cough, dysphagia, feeding difficulties  Radiation dose (reference air kerma): 6.534 mGy  Technique: Multiple consistencies administered by speech therapy, including  thin, pudding and solid consistency, barium pill with thin barium.     FINDINGS:     -Thin: No findings of laryngeal penetration or aspiration.     -Pudding consistency : Swallowing delay with premature spillage. No reported  aspiration or penetration.     -Solid consistency: Swallowing delay with premature spillage. No findings of  laryngeal penetration or aspiration.     -13 mm barium pill with thin barium: No findings of aspiration or penetration.     Hospital Course:  56F presented to ED with worsening AMS. Not eating nor drinking for 5 days. PMHx remarkable for COPD, major depression and attempted suicide requiring inpatient [facility] stay 10/2016. CT head in ER wnl. Severe hypokalemia 1.9 and +UA . She was admitted and K+ replaced via oral and IV routes, on emperic rocephin,urine culture negative. Psych consulted 4/28 but did not have a clear picture of patients picture and was unable to reach boyfriend who is the only known contact, patient has no other family. Celexa recommended and started 4/29. MRI head completed showing only mild atrophy/SVIC. Patient continued to be delusional about her thick saliva, and got tachypnic when anyone came near her. She also stated she was \"not in control of parts of her body. She has been awake alert and oriented. Pt noted with transamnitis on presentation. Hepatitis panel neg. CLAUDIA neg. CRP low normal. She has received 4 days of antibiotics for culture negative UTI, K+ replaced. 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 Patient has had progressive symmetric ascending motor paralysis over 3-4 weeks. After multiple tests,including nerve conduction study which showed more an axonal process so that suggest axonal type GBS,patient was started on IVIG for 5 days which she had completed. Also was prescribed thiamine,folic acid. She is on gabapentin 600 mg po tid for her painful paresthesias. CSF showed elevated protein with no cells. Elevated CMV IgG but not IgM. HIV test negative. As patient improved,neurology has recommended rehab. Patient is clinically stable and will be transferred to SNF today. P/E  NAD. Lungs ctab  Heart s1s2 nl  Abd:soft,not tender. Neuro:aaox3. Time for discharge:40 minutes.     Tad Choi MD  May 19, 2017

## 2017-05-19 NOTE — PROGRESS NOTES
Assumed care of patient, patient in bed resting , no c/o pain. Call bell in reach.  Sbar report received from [employer]

## 2017-05-19 NOTE — PROGRESS NOTES
Care Management Interventions  PCP Verified by CM: Yes  Mode of Transport at Discharge: BLS  Discharge Durable Medical Equipment: No  Physical Therapy Consult: Yes  Occupational Therapy Consult: Yes  Speech Therapy Consult: Yes  Current Support Network:  Other (lives with boyfriend)  Confirm Follow Up Transport: Family  Plan discussed with Pt/Family/Caregiver: Yes  Discharge Location  Discharge Placement: Skilled nursing facility ([facility])

## 2017-05-19 NOTE — PROGRESS NOTES
Problem: Self Care Deficits Care Plan (Adult)  Goal: *Acute Goals and Plan of Care (Insert Text)  Occupational Therapy Goals  Initiated 4/29/2017; re-evaluated on 5/16/2017 within 7 day(s). Continue with all goals. 1. Patient will perform self-feeding with minimal assistance/contact guard assist, adaptive strategies prn.   2. Patient will perform grooming with minimal assistance/contact guard assist.  3. Patient will perform upper body dressing with minimal assistance/contact guard assist.  4. Patient will perform functional task for 3 minutes while seated on EOB with mod assist for balance. 5. Patient will participate in upper extremity therapeutic exercise/activities with minimal assistance/contact guard assist for 8 minutes to increase strength/endurance for ADLs. 6. Patient will perform functional task with minimal assistance utilizing adaptive strategies, prn, to maintain function for ADLs. Outcome: Progressing Towards Goal  OCCUPATIONAL THERAPY TREATMENT     Patient: Zachary Ireland (89 y.o. female)  Date: 5/19/2017  Diagnosis: Hypokalemia  UTI (urinary tract infection)  Dehydration  Encephalopathy  Polycythemia (HCC)  Elevated liver enzymes  Lactic acidosis  Leukocytosis Encephalopathy       Precautions: Fall  Chart, occupational therapy assessment, plan of care, and goals were reviewed. ASSESSMENT:  Pt actively moving BUEs adjusting bed linens upon entry and immediately presenting multiple reasons for not participating in therapy, ie fever (98.3), can't breath 2/2 nasal congestion, sore throat, chills, pain w/movement, etc. Provided pt w/modified adaptive toothette for oral care. Pt uses LUE and hand over hand assist to complete task w/Min Assist. Pt LUE ataxic w/ADL item retrieval requiring hand over hand assist. Minimal (B) shoulder AROM, requiring Max Assist w/UB dressing ADL, [facility] gown. Pt refusing UE TherEx, 2/2 c/o pain. NSG, aware.   EDUCATION Pt educated on use of adaptive equipment, built-up utensils, for increase independence w/self-feeding and ADL grooming tasks. Progression toward goals:  [ ]          Improving appropriately and progressing toward goals  [X]          Improving slowly and progressing toward goals  [ ]          Not making progress toward goals and plan of care will be adjusted       PLAN:  Patient continues to benefit from skilled intervention to address the above impairments. Continue treatment per established plan of care. Discharge Recommendations:  Skilled Nursing Facility/LTC  Further Equipment Recommendations for Discharge:  hospital bed       SUBJECTIVE:   Patient stated I can't, I've got a fever.       OBJECTIVE DATA SUMMARY:         Cognitive/Behavioral Status:  Neurologic State: Alert  Orientation Level: Oriented X4  Cognition: Appropriate decision making  Safety/Judgement: Fall prevention  Functional Mobility and Transfers for ADLs:  ADL Intervention:  Grooming  Washing Face: Moderate assistance  Washing Hands: Maximum assistance  Brushing Teeth: Minimum assistance     Upper Body [address] Gown: Maximum assistance     Pain:  Pre Treatment:0  Post Treatment:0  Pain Scale 1: Visual  Pain Intensity 1: 0  Pain Location 1: Generalized  Pain Description 1: Aching  Pain Intervention(s) 1: Medication (see MAR)     Activity Tolerance:    Poor     Please refer to the flowsheet for vital signs taken during this treatment.   After treatment:   [ ]  Patient left in no apparent distress sitting up in chair  [X]  Patient left in no apparent distress in bed  [X]  Call bell left within reach  [X]  Nursing notified  [ ]  Caregiver present  [ ]  Bed alarm activated     ANGÉLICA Woods  Time Calculation: 14 mins

## 2017-05-19 NOTE — ROUTINE PROCESS
Bedside and Verbal shift change report given to [facility] (oncoming nurse) by Caterina Tinoco RN (offgoing nurse). Report included the following information SBAR, Kardex, Intake/Output and MAR.

## 2017-05-19 NOTE — ROUTINE PROCESS
Patient d/c at [phone] via transport. Patient is unable to sign d/ c paperwork. Patient is parae that she is going to [facility]. Patient is A&Ox4. Iv removed, as well as armband Sbar report called to Aurelia guzman, report given to Ronaldo Torrez at [facility].

## 2017-05-20 LAB
BACTERIA SPEC CULT: NORMAL
BACTERIA SPEC CULT: NORMAL
SERVICE CMNT-IMP: NORMAL
SERVICE CMNT-IMP: NORMAL

## 2017-05-22 LAB
BACTERIA SPEC CULT: ABNORMAL
SERVICE CMNT-IMP: ABNORMAL

## 2018-09-20 ENCOUNTER — OFFICE VISIT (OUTPATIENT)
Dept: NEUROLOGY | Age: 58
End: 2018-09-20

## 2018-09-20 VITALS
TEMPERATURE: 98.4 F | HEART RATE: 64 BPM | SYSTOLIC BLOOD PRESSURE: 120 MMHG | HEIGHT: 64 IN | RESPIRATION RATE: 22 BRPM | OXYGEN SATURATION: 95 % | DIASTOLIC BLOOD PRESSURE: 80 MMHG

## 2018-09-20 DIAGNOSIS — G89.4 CHRONIC PAIN SYNDROME: Primary | ICD-10-CM

## 2018-09-20 DIAGNOSIS — G61.0 AXONAL GBS (GUILLAIN-BARRE SYNDROME) (HCC): ICD-10-CM

## 2018-09-20 RX ORDER — CARISOPRODOL 350 MG/1
350 TABLET ORAL 3 TIMES DAILY
COMMUNITY

## 2018-09-20 RX ORDER — PREGABALIN 200 MG/1
CAPSULE ORAL
COMMUNITY
Start: 2018-08-19

## 2018-09-20 NOTE — PROGRESS NOTES
HPI:  68-year-old female referred by Dr. Johanny Harmon coming with a chief complaint of diffuse pain she comes with her . The history is very difficult, as the patient is in a severe amount of pain and her  is quite aggravated and irritated and angry and coming in and out of the exam room throughout the whole evaluation. She has a very complicated history, I have summarily reviewed some of the records that bring her here to me. On April 2017 she developed weakness of her legs. Reportedly she had a fall where her weight was not supported by her legs. She was admitted to the [facility]. She had lumbar punctures, evaluations that led to a diagnosis of an axonal variant of Guillain-Barré syndrome, a diagnosis that is documented in her charts, but which patient and  question as their impression is that they really did not know for sure what she had. At any rate she received 5 treatments of IVIG. She really did not have any improvement. after her fall in April that was the last time she has ever been able to walk. She has history of chronic low back pain that predated her presentation, and she had been on opiates for many years, attending a pain clinic. An MRI of the lumbar spine show no evidence of central lumbar stenosis back in May 2017. She had an unremarkable MRI of the cervical  Spine. An MRI of the brain in April 2017 showed atrophy and mild to moderate chronic white matter disease. In addition to symptoms of weakness, she develop increasing problems with sensory loss. Over time she started having the pain and numbness that seemed to start from the toes and extend of the ankles and to the knees and which eventually was including her whole body. She has been seen by a neurologist by the name of Dr. Josselin Ballesteros and I reviewed some of his nodes up to late this spring.   Reportedly she has been treated with different medications including opiates, she remembers taking duloxetine in the past which did not help her, although she does not remember the dose and he was not given for the pain that started in 2017, narcotics, gabapentin, Lyrica, Soma, and other muscle relaxants that have been prescribed by multiple physicians. There was coordination of care done and when he became evident that she was getting prescription from different physicians and she was not letting each physician now, Dr. Jordan hSaw on his notes documents that he recommended that she has a single physician prescribed his medications and deferred this, according to patient and her 's office communicated to her that they really had nothing else they could do for her. She reports that at one point she was taking a combination of Lyrica 200 mg 3 times a day along with Soma 350 mg 3 times a day and she is ambivalent as to how she was, seen at one point that he was helping, at different point she was saying that she was miserable with pain regardless of the medications, and at another point  saying that she still had problems moving and with diffuse pain all over her body with the exception of her hair for which only the combination of Lyrica and Soma help. She has not been able to obtain a prescription for Soma and she as well as her  are quite insistent that they want one. She also does not currently have a prescription for Lyrica and she comes today to see me expecting me to fill this medication as well. There are specific descriptions of dates and amounts of specific prescriptions and specific names of physicians that have prescribed these medications to her and she reports as well as her  that they do not know any of those names except for Dr. Jordan Shaw. Social History     Social History    Marital status:      Spouse name: N/A    Number of children: N/A    Years of education: N/A     Occupational History    Not on file.      Social History Main Topics    Smoking status: Current Every Day Smoker     Packs/day: 0.50     Years: 30.00    Smokeless tobacco: Never Used    Alcohol use No    Drug use: No    Sexual activity: Not on file     Other Topics Concern    Not on file     Social History Narrative       Family History   Problem Relation Age of Onset    Colon Cancer Other     Cancer Other      lung       Current Outpatient Prescriptions   Medication Sig Dispense Refill    LYRICA 200 mg capsule       carisoprodol (SOMA) 350 mg tablet Take 350 mg by mouth three (3) times daily.  atenolol (TENORMIN) 50 mg tablet Take 1 Tab by mouth daily. Indications: HYPERTENSION 30 Tab 0    desloratadine (CLARINEX) 5 mg tablet Take 5 mg by mouth daily.  acetaminophen (TYLENOL) 325 mg tablet Take 2 Tabs by mouth every four (4) hours as needed. 30 Tab 0    enoxaparin (LOVENOX) 40 mg/0.4 mL 0.4 mL by SubCUTAneous route every twenty-four (24) hours. 10 Syringe 0    folic acid (FOLVITE) 1 mg tablet Take 1 Tab by mouth daily. 30 Tab 0    oxyCODONE IR (ROXICODONE) 10 mg tab immediate release tablet Take 1 Tab by mouth every four (4) hours as needed. Max Daily Amount: 60 mg. 12 Tab 0    scopolamine (TRANSDERM-SCOP) 1.5 mg (1 mg over 3 days) pt3d 1 Patch by TransDERmal route every seventy-two (72) hours. 10 Patch 0    senna (SENOKOT) 8.6 mg tablet Take 2 Tabs by mouth nightly. 10 Tab 0    Thiamine Mononitrate (B-1) 100 mg tablet Take 1 Tab by mouth daily. 30 Tab 0    acetaminophen (TYLENOL) 325 mg tablet Take 2 Tabs by mouth every four (4) hours as needed. Indications: BACK PAIN 60 Tab 0    citalopram (CELEXA) 20 mg tablet Take 1 Tab by mouth daily. Indications: MAJOR DEPRESSIVE DISORDER 30 Tab 0    clonazePAM (KLONOPIN) 1 mg tablet Take 1 Tab by mouth nightly. Max Daily Amount: 1 mg. Indications: PANIC DISORDER 30 Tab 0    famotidine (PEPCID) 40 mg tablet Take 1 Tab by mouth daily.  Indications: DYSPEPSIA, HEARTBURN PREVENTION 30 Tab 0    loratadine (CLARITIN) 10 mg tablet Take 1 Tab by mouth daily. Indications: ALLERGIC RHINITIS 30 Tab 0    mirtazapine (REMERON) 7.5 mg tablet Take 1 Tab by mouth nightly. Indications: MAJOR DEPRESSIVE DISORDER 30 Tab 0    pseudoephedrine (SUDAFED) 30 mg tablet Take 1 Tab by mouth every six (6) hours as needed for Congestion. Indications: NASAL CONGESTION 60 Tab 0    QUEtiapine (SEROQUEL) 100 mg tablet Take 1 Tab by mouth nightly. Indications: DEPRESSION TREATMENT ADJUNCT 30 Tab 0    tiZANidine (ZANAFLEX) 4 mg tablet One tablet every 6 hours as needed for muscle spasm  Indications: MUSCLE SPASM 90 Tab 0    fluticasone (FLONASE) 50 mcg/actuation nasal spray 1 [place] by Both Nostrils route daily. 1 Bottle 0    tiZANidine (ZANAFLEX) 4 mg tablet Take 1-2 Tabs by mouth three (3) times daily. As needed for muscle spasms 180 Tab 2    famotidine (PEPCID) 40 mg tablet Take 40 mg by mouth daily.  nitroglycerin (NITROSTAT) 0.4 mg SL tablet by SubLINGual route every five (5) minutes as needed.  potassium chloride (KLOR-CON M10) 10 mEq tablet Take  by mouth two (2) times a day.  albuterol (PROAIR HFA) 90 mcg/Actuation inhaler Take 1 Puff by inhalation.  dextromethorphan-guaifenesin (MUCINEX DM)  mg per tablet Take 1 Tab by mouth two (2) times a day.          Past Medical History:   Diagnosis Date    Allergic rhinitis     Angina pectoris ([facility])     Anxiety     Bronchitis     Chronic fatigue syndrome     Chronic pain     COPD (chronic obstructive pulmonary disease) ([facility])     Generalized pain     Headache, tension-type     HTN (hypertension)     Hyperlipidemia     Hypokalemia     Insomnia     Leg swelling     Lyme disease     Nicotine dependence     OA (osteoarthritis)     RA (rheumatoid arthritis) ([facility])     Vertigo     Vitamin D deficiency        Past Surgical History:   Procedure Laterality Date    HX BUNIONECTOMY  1991    left foot       Allergies   Allergen Reactions    Aspirin Other (comments)     Stomach cramps    Ibuprofen Not Reported This Time    Levaquin [Levofloxacin] Not Reported This Time           Mobic [Meloxicam] Not Reported This Time    Pcn [Penicillins] Not Reported This Time    Plaquenil [Hydroxychloroquine] Not Reported This Time       Patient Active Problem List   Diagnosis Code    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    Pain in joint, multiple sites M25.50    Cervicalgia M54.2    Chronic low back pain M54.5, G89.29    Migraine without aura G43.009    Myalgia and myositis ILN6153    Encounter for long-term (current) use of high-risk medication Z79.899    Enthesopathy of hip region M76.899    Tobacco abuse Z72.0    Chronic headache disorder R51    Drug-induced constipation K59.03    Muscle spasms of neck M62.838    RLS (restless legs syndrome) G25.81    Depression F32.9    Dehydration E86.0    Hypokalemia E87.6    Lactic acidosis E87.2    Polycythemia D75.1    Leukocytosis D72.829    UTI (urinary tract infection) N39.0    Encephalopathy G93.40    Elevated liver enzymes R74.8    Acute inflammatory polyneuropathy (HCC) G61.0    Axonal Guillain-Crockett syndrome (HCC) G61.0    Quadriparesis (HCC) G82.50         Review of Systems:   Constitutional: no fever or chills  Skin: Rash depression bilateral lower extremities  HEENT:  Denies tinnitus, hearing loss, or visual changes  Respiratory: denies shortness of breath  Cardiovascular: denies chest pain, dyspnea on exertion  Gastrointestinal: does not report nausea or vomiting  Genitourinary: does not report dysuria or incontinence  Musculoskeletal: Reports severe massive pain everywhere in her body, in her muscles, proximally and distal, and the thorax  Endocrine: denies weight change  Hematology: denies easy bruising or bleeding   Neurological: as above in HPI      PHYSICAL EXAMINATION:      VITAL SIGNS:    Visit Vitals    /80 (BP 1 Location: Left arm, BP Patient Position: Supine)  Comment (BP Patient Position): lower arm    Pulse 64    Temp 98.4 °F (36.9 °C) (Oral)    Resp 22    Ht 5' 4\" (1.626 m)    SpO2 95%       GENERAL: Well developed, coming in a stretcher, in severe distress due to severe pain. [place]ellow HEART: RR, no murmurs heard, no carotid bruits  EXTREMITIES: No clubbing, cyanosis, or edema is identified. Pulses 2+     and symmetrical.  She has bluish discoloration of her legs from some type of skin product, she has multiple pads covering her legs. HEAD:   Normocephalic, atraumatic. NEUROLOGIC EXAMINATION    MENTAL STATUS: Awake, alert, and oriented x 4. Attention and STM are diminished secondary to the severe pain she has. There is no aphasia. Fund of knowledge is adequate. Mood and affect are very tearful and distressed. CRANIAL NERVES: Visual fields are full to confrontation. Unable to see fundi pupils are reactive to light and accommodation. Extraocular movements are intact and there is no nystagmus. Facial sensation is normal  Face is symmetrical.   Hearing is present. Rest of cranial nerves could not be properly assessed due to patient's severe discomfort      MOTOR:  No obvious lateralizing weakness, power testing was not possible because the patient has severe pain and breaks into screaming and tears with the least amount of touch or manipulation of any part of her body, including thorax, face, head, neck, and proximal upper and lower extremities. CEREBELLAR: No tremors     SENSORY: Severe hyperalgesia as mentioned, otherwise untestable    DTR's: Not obtainable, no long track signs     GAIT:   Nonambulatory      Impression: Chronic pain, this predated the presumed diagnosis of axonal Guillain-Barré syndrome in 2017, a condition that is not known to cause small fiber neuropathic type of pain, with pain that has been worsened ever since this instance and which I do not have a sensible neurophysiologic explanation. The pain is not following a specific central or peripheral nervous system distribution.   She has history of multiple physicians prescribing her medications and problems with coordination. Therefore, I agree that she must be managed  by her primary care physician and at a pain clinic. Plan: Recommended patient and  to continue the pursue of a referral to the pain clinic. They say that it takes 6-7 months to get him. They suggest that there are referrals in place. They report that she has been to a pain clinic before, but she has not been to 1 for a while. I cannot quite understand how she has had pain for much longer than the 6-7 months that he takes them, according to the reports, to get to a pain clinic, but I explained to them that her pain syndrome is beyond the scope of my skills as a neurologist and needs a dedicated pain clinic to properly treat. After I expressed my position, the patient had multiple expressions about the fact that she needs help, she was hysterically crying, I had to ask compassionately as I could explain to her the reasons why I am on capable of helping her and why if she needs refills of Lyrica and Soma which have been given by prior physicians before me and which she cannot get that she must get it through her primary care physician or through a pain clinic. Her  was also quite agitated, going in and out of the room, threatening with legal action against physicians  he has seen before and even made the comments that he was going to show up in their offices and become aggressive with them. I also suggested that if she feels that she needs another neurological opinion, that she should talk to her primary care physician about this. I spent 45 minutes with the patient in face-to-face consultation, with 25 minutes spent in counseling and coordination of care as described above. PLEASE NOTE:   Portions of this document may have been produced using voice recognition software. Unrecognized errors in transcription may be present.        This note will not be viewable in MyChart.

## 2018-09-20 NOTE — MR AVS SNAPSHOT
[address] [place] [id number] [phone] Patient: Earl Saint MRN: [id number] Visit Information Date & Time Provider Department Dept. Phone Encounter #  
 9/20/2018  9:30 AM FLO [facility] [phone] [id number] Upcoming Health Maintenance Date Due Pneumococcal 19-64 Highest Risk (1 of 3 - PCV13) 8/16/1977 DTaP/Tdap/Td series (1 - Tdap) 8/16/1979 PAP AKA CERVICAL CYTOLOGY 8/16/1979 FOBT Q 1 YEAR AGE 50-75 8/16/2008 BREAST CANCER SCRN MAMMOGRAM 10/11/2013 ZOSTER VACCINE AGE 60> 6/16/2018 Influenza Age 5 to Adult 8/1/2018 Allergies as of 9/20/2018  Review Complete On: 9/20/2018 By: Joy Sands LPN Severity Noted Reaction Type Reactions Aspirin  11/01/2011    Other (comments) Stomach cramps Ibuprofen    Not Reported This Time Levaquin [Levofloxacin]    Not Reported This Time Mobic [Meloxicam]    Not Reported This Time  
 Pcn [Penicillins]    Not Reported This Time Plaquenil [Hydroxychloroquine]    Not Reported This Time Current Immunizations  Reviewed on 10/4/2016 No immunizations on file. Not reviewed this visit Vitals BP Pulse Temp Resp Height(growth percentile) SpO2  
 120/80 (BP 1 Location: Left arm, BP Patient Position: Supine) 64 98.4 °F (36.9 °C) (Oral) 22 5' 4\" (1.626 m) 95% OB Status Smoking Status Postmenopausal Current Every Day Smoker Vitals History Preferred Pharmacy Pharmacy Name Phone [id number], [address] [place] [address] [phone] Your Updated Medication List  
  
   
This list is accurate as of 9/20/18 10:44 AM.  Always use your most recent med list.  
  
  
  
  
 * acetaminophen 325 mg tablet Commonly known as:  TYLENOL Take 2 Tabs by mouth every four (4) hours as needed.  Indications: BACK PAIN  
  
 * acetaminophen 325 mg tablet Commonly known as:  TYLENOL Take 2 Tabs by mouth every four (4) hours as needed. atenolol 50 mg tablet Commonly known as:  TENORMIN Take 1 Tab by mouth daily. Indications: HYPERTENSION  
  
 citalopram 20 mg tablet Commonly known as:  Arvel Saltness Take 1 Tab by mouth daily. Indications: MAJOR DEPRESSIVE DISORDER  
  
 CLARINEX 5 mg tablet Generic drug:  desloratadine Take 5 mg by mouth daily. clonazePAM 1 mg tablet Commonly known as:  Viola Bourdon Take 1 Tab by mouth nightly. Max Daily Amount: 1 mg. Indications: PANIC DISORDER  
  
 enoxaparin 40 mg/0.4 mL Commonly known as:  LOVENOX  
0.4 mL by SubCUTAneous route every twenty-four (24) hours. * famotidine 40 mg tablet Commonly known as:  PEPCID Take 40 mg by mouth daily. * famotidine 40 mg tablet Commonly known as:  PEPCID Take 1 Tab by mouth daily. Indications: DYSPEPSIA, HEARTBURN PREVENTION  
  
 fluticasone 50 mcg/actuation nasal spray Commonly known as:  FLONASE  
1 [place] by Both Nostrils route daily. folic acid 1 mg tablet Commonly known as:  Google Take 1 Tab by mouth daily. KLOR-CON M10 10 mEq tablet Generic drug:  potassium chloride Take  by mouth two (2) times a day. loratadine 10 mg tablet Commonly known as:  Jearline Jeannie Take 1 Tab by mouth daily. Indications: ALLERGIC RHINITIS  
  
 LYRICA 200 mg capsule Generic drug:  pregabalin  
  
 mirtazapine 7.5 mg tablet Commonly known as:  Hamlet Mahoney Take 1 Tab by mouth nightly. Indications: MAJOR DEPRESSIVE DISORDER  
  
 MUCINEX -30 mg per tablet Generic drug:  guaiFENesin-dextromethorphan SR Take 1 Tab by mouth two (2) times a day. nitroglycerin 0.4 mg SL tablet Commonly known as:  NITROSTAT  
by SubLINGual route every five (5) minutes as needed. oxyCODONE IR 10 mg Tab immediate release tablet Commonly known as:  Narayan Munoz  
 Take 1 Tab by mouth every four (4) hours as needed. Max Daily Amount: 60 mg. PROAIR HFA 90 mcg/actuation inhaler Generic drug:  albuterol Take 1 Puff by inhalation. pseudoephedrine 30 mg tablet Commonly known as:  SUDAFED Take 1 Tab by mouth every six (6) hours as needed for Congestion. Indications: NASAL CONGESTION  
  
 QUEtiapine 100 mg tablet Commonly known as:  SEROquel Take 1 Tab by mouth nightly. Indications: DEPRESSION TREATMENT ADJUNCT  
  
 scopolamine 1 mg over 3 days Pt3d Commonly known as:  TRANSDERM-SCOP  
1 Patch by TransDERmal route every seventy-two (72) hours. senna 8.6 mg tablet Commonly known as:  Peter Shailatrudi Jason Take 2 Tabs by mouth nightly. SOMA 350 mg tablet Generic drug:  carisoprodol Take 350 mg by mouth three (3) times daily. Thiamine Mononitrate 100 mg tablet Commonly known as:  B-1 Take 1 Tab by mouth daily. * tiZANidine 4 mg tablet Commonly known as:  Billy Pique Take 1-2 Tabs by mouth three (3) times daily. As needed for muscle spasms * tiZANidine 4 mg tablet Commonly known as:  Billy Pique One tablet every 6 hours as needed for muscle spasm  Indications: MUSCLE SPASM * Notice: This list has 6 medication(s) that are the same as other medications prescribed for you. Read the directions carefully, and ask your doctor or other care provider to review them with you. To-Do List   
 09/24/2018 11:15 AM  
  Appointment with [facility] MRI RM 1 at [address] [facility] ([phone]) GENERAL INSTRUCTIONS  Bring information (ID card) if you have any medically implanted devices. You will be required to lie still while the procedure is being performed. Remove any jewelry (including body piercing, hairpins) prior to MRI. If you have had a creatinine level drawn within the past 30 days, please bring most recent results to your appt.   Bring any films, CD's, and reports related to your study with you on the day of your exam.  This only includes studies done outside of [address] [place] [address], [facility], [facility] Mcallister [address], and [place]. Bring a complete list of all medications you are currently taking to include prescriptions, over-the-counter meds, herbals, vitamins & any dietary supplements. If you were given medications for claustrophobia or anxiety, please arrange to have someone drive you to your appointment. QUESTIONS  Notify the MRI Department if you have any questions concerning your study. [place] Mcallister  - [phone] [address] [place]  [phone] Introducing [facility]! [facility] introduces [employer] patient portal. Now you can access parts of your medical record, email your doctor's office, and request medication refills online. 1. In your internet browser, go to [email or web address] 2. Click on the First Time User? Click Here link in the Sign In box. You will see the New Member Sign Up page. 3. Enter your [employer] Access Code exactly as it appears below. You will not need to use this code after youve completed the sign-up process. If you do not sign up before the expiration date, you must request a new code. · [employer] Access Code: [email or web address] Expires: 12/3/2018  4:56 PM 
 
4. Enter the last four digits of your Social Security Number (xxxx) and Date of Birth (mm/dd/yyyy) as indicated and click Submit. You will be taken to the next sign-up page. 5. Create a [employer] ID. This will be your [employer] login ID and cannot be changed, so think of one that is secure and easy to remember. 6. Create a [employer] password. You can change your password at any time. 7. Enter your Password Reset Question and Answer. This can be used at a later time if you forget your password. 8. Enter your e-mail address. You will receive e-mail notification when new information is available in [address] 9. Click Sign Up. You can now view and download portions of your medical record. 10. Click the Download Summary menu link to download a portable copy of your medical information. If you have questions, please visit the Frequently Asked Questions section of the [employer] website. Remember, [employer] is NOT to be used for urgent needs. For medical emergencies, dial 911. Now available from your iPhone and Android! Please provide this summary of care documentation to your next provider. Your primary care clinician is listed as Lucero Second. If you have any questions after today's visit, please call [phone].

## 2018-09-20 NOTE — PROGRESS NOTES
Dasia James is a 59 y.o. female new patient referred by Dr. Laron Putnam to discuss generalized pain. Learning assessment previously completed 8/25/2014; primary language is [place].

## 2018-12-20 ENCOUNTER — HOSPITAL ENCOUNTER (OUTPATIENT)
Dept: MRI IMAGING | Age: 58
Discharge: HOME | End: 2018-12-20
Attending: FAMILY MEDICINE
Payer: MEDICAID

## 2018-12-20 DIAGNOSIS — G61.0: ICD-10-CM

## 2018-12-20 PROCEDURE — 70551 MRI BRAIN STEM W/O DYE: CPT

## 2019-02-14 ENCOUNTER — HOSPITAL ENCOUNTER (OUTPATIENT)
Dept: ULTRASOUND IMAGING | Age: 59
Discharge: HOME | End: 2019-02-14
Attending: FAMILY MEDICINE
Payer: MEDICAID

## 2019-02-14 DIAGNOSIS — R19.00 SWELLING ABDOMEN: ICD-10-CM

## 2019-02-14 PROCEDURE — 76700 US EXAM ABDOM COMPLETE: CPT

## 2022-10-17 NOTE — CONSULTS
Chief Complaint: Telepsychiatry follow up    History of Present Illness: This pt is a 60 yr old female with unclear psychiatric history. She came to the hospital on 4/26, brought in by police for PTT, AMS. Apaprenlty she was not eating or drinking for 5 days. She was found to have a UTI and with potassium of 1.9. She was admitted to medicine. During the hospital stay there has been concern that she is depressed, possibly psychotic - as the pt has hx of psychiatric hospitalization in Oct 2016 for a suicide attempt. Also durring this hospitalization she may have some bizarre thoughts about her saliva containing sand. Pt was seen by telepsychiatry on 4/28. At that time interview was limited and collateral could not be obtained. Telepsychiatry was consulted for assessment and recommendations for management. Upon interview today the pt says that she came here ot the hospital by ambulance from home. She says that her boyfriend called the ambulance because shewas feeling weak. She says that she has been having this issue with her saliva - she says that her saliva feels like it has sand in it. Throughout the interview she uses the suction to remove the saliva in her mouth. She also reports that in the past few days prior to this hospitalization she has not been able to walk or move. She says that she was bedridden for four days because of this. She says that everything feels numb in her body and that she has no control over her body. . She says that she cannot sleep, eat, drink or speak. She says that because of her sx she has felt sad and frustrated. She denies feeling depressed in the days or weeks prior to her sx starting . She denies any suicidal thoughts. She does admit to 1 previous psych hospitalization in Oct after a suicide attempt. She adamantly denies any suicidal thoughts now stating that \"I just want to feel better. \" She says that she was not taking any medications - medical or psych -at home.   She repeatedly says that she does not think that the cause of her sx is psychiatric - she feels like it is medical.       Collateral: unable to be obtained , boyfriend not present during interview       :   Mental Status Exam:   Appearance and attire: pt is  Lying in bed, disheveled   Attitude and behavior: guarded   Speech: clear, coherent, short phrases   Affect and mood: blunted, irritable  Association and thought processes: goal directed   Thought content: preoccupations with her saliva. Denies SI   Perception: pt does not appear to be responding to internal stimuli. Sensorium, memory, and orientation: alert  Intellectual functioning: unable to assess  Insight and judgment: impaired       Diagnosis:   Dx unclear    Treatment Recommendations: Without collateral from the pt's boyfriend to get more information about the pt's baseline and current presentation, a full psychiatric evaluation is limited. Thus her dx is still unclear. She does report feeling depressed but says that her depression is a result of her medical sx - not the cause of them. She has these preoccupations with her saliva. Unclear if these preoccupations are in fact somatic delusions. She is not grossly psychotic (ie her thought process is not overly disorganized); she is nnot suicidal or homicidal. Yet there is concern that she has not been eating, drinking, or getting out of bed. Because a medical etiology has not been found, a psychiatric etiology is being considered. In speaking with the primary team it sounds like the pt is medically cleared. Because there is a concern about the pt's mental well being and this affecting her ability to take care of herself, I do think it is worthwhile for the pt to be further evaluated and observed on an in psychiatric unit. The pt does not agree to this so I recommend that she be screened for commitment by the [facility].  If they do not think that she meets criteria for commitment, then I recommend that the team social work help the pt to establish outpt psychiatric followup. In the meantime I agree with her being on celexa and restarting her previous medication of seroquel at 50mg qhs. This may help with any psychosis, if present, and may also help her sleep. Pt expressed agreement with that plan.        Hui Bentley MD   Telepsychiatry      Hui Bentley MD  Telepsychiatry V-Y Flap Text: The defect edges were debeveled with a #15 scalpel blade.  Given the location of the defect, shape of the defect and the proximity to free margins a V-Y flap was deemed most appropriate.  Using a sterile surgical marker, an appropriate advancement flap was drawn incorporating the defect and placing the expected incisions within the relaxed skin tension lines where possible.    The area thus outlined was incised deep to adipose tissue with a #15 scalpel blade.  The skin margins were undermined to an appropriate distance in all directions utilizing iris scissors.